# Patient Record
Sex: FEMALE | Race: WHITE | HISPANIC OR LATINO | Employment: FULL TIME | URBAN - METROPOLITAN AREA
[De-identification: names, ages, dates, MRNs, and addresses within clinical notes are randomized per-mention and may not be internally consistent; named-entity substitution may affect disease eponyms.]

---

## 2018-05-14 ENCOUNTER — TRANSCRIBE ORDERS (OUTPATIENT)
Dept: ADMINISTRATIVE | Facility: HOSPITAL | Age: 43
End: 2018-05-14

## 2018-05-14 DIAGNOSIS — R52 PAIN: Primary | ICD-10-CM

## 2018-05-14 DIAGNOSIS — R60.9 SWELLING: ICD-10-CM

## 2018-05-15 ENCOUNTER — HOSPITAL ENCOUNTER (OUTPATIENT)
Dept: CARDIOLOGY | Facility: HOSPITAL | Age: 43
Discharge: HOME | End: 2018-05-15
Attending: ORTHOPAEDIC SURGERY | Admitting: ORTHOPAEDIC SURGERY

## 2018-05-15 DIAGNOSIS — R60.9 SWELLING: ICD-10-CM

## 2018-05-15 DIAGNOSIS — R52 PAIN: ICD-10-CM

## 2018-05-15 LAB
BH CV LOWER VASCULAR LEFT COMMON FEMORAL AUGMENT: NORMAL
BH CV LOWER VASCULAR LEFT COMMON FEMORAL COMPETENT: NORMAL
BH CV LOWER VASCULAR LEFT COMMON FEMORAL COMPRESS: NORMAL
BH CV LOWER VASCULAR LEFT COMMON FEMORAL PHASIC: NORMAL
BH CV LOWER VASCULAR LEFT COMMON FEMORAL SPONT: NORMAL
BH CV LOWER VASCULAR LEFT DISTAL FEMORAL COMPRESS: NORMAL
BH CV LOWER VASCULAR LEFT GASTRONEMIUS COMPRESS: NORMAL
BH CV LOWER VASCULAR LEFT GREATER SAPH AK COMPRESS: NORMAL
BH CV LOWER VASCULAR LEFT GREATER SAPH BK COMPRESS: NORMAL
BH CV LOWER VASCULAR LEFT LESSER SAPH COMPRESS: NORMAL
BH CV LOWER VASCULAR LEFT MID FEMORAL AUGMENT: NORMAL
BH CV LOWER VASCULAR LEFT MID FEMORAL COMPETENT: NORMAL
BH CV LOWER VASCULAR LEFT MID FEMORAL COMPRESS: NORMAL
BH CV LOWER VASCULAR LEFT MID FEMORAL PHASIC: NORMAL
BH CV LOWER VASCULAR LEFT MID FEMORAL SPONT: NORMAL
BH CV LOWER VASCULAR LEFT PERONEAL COMPRESS: NORMAL
BH CV LOWER VASCULAR LEFT POPLITEAL AUGMENT: NORMAL
BH CV LOWER VASCULAR LEFT POPLITEAL COMPETENT: NORMAL
BH CV LOWER VASCULAR LEFT POPLITEAL COMPRESS: NORMAL
BH CV LOWER VASCULAR LEFT POPLITEAL PHASIC: NORMAL
BH CV LOWER VASCULAR LEFT POPLITEAL SPONT: NORMAL
BH CV LOWER VASCULAR LEFT POSTERIOR TIBIAL COMPRESS: NORMAL
BH CV LOWER VASCULAR LEFT PROXIMAL FEMORAL COMPRESS: NORMAL
BH CV LOWER VASCULAR LEFT SAPHENOFEMORAL JUNCTION AUGMENT: NORMAL
BH CV LOWER VASCULAR LEFT SAPHENOFEMORAL JUNCTION COMPETENT: NORMAL
BH CV LOWER VASCULAR LEFT SAPHENOFEMORAL JUNCTION COMPRESS: NORMAL
BH CV LOWER VASCULAR LEFT SAPHENOFEMORAL JUNCTION PHASIC: NORMAL
BH CV LOWER VASCULAR LEFT SAPHENOFEMORAL JUNCTION SPONT: NORMAL
BH CV LOWER VASCULAR RIGHT COMMON FEMORAL AUGMENT: NORMAL
BH CV LOWER VASCULAR RIGHT COMMON FEMORAL COMPETENT: NORMAL
BH CV LOWER VASCULAR RIGHT COMMON FEMORAL COMPRESS: NORMAL
BH CV LOWER VASCULAR RIGHT COMMON FEMORAL PHASIC: NORMAL
BH CV LOWER VASCULAR RIGHT COMMON FEMORAL SPONT: NORMAL

## 2018-05-15 PROCEDURE — 93971 EXTREMITY STUDY: CPT

## 2018-12-11 ENCOUNTER — APPOINTMENT (OUTPATIENT)
Dept: LAB | Facility: HOSPITAL | Age: 43
End: 2018-12-11

## 2018-12-11 ENCOUNTER — CONSULT (OUTPATIENT)
Dept: BARIATRICS/WEIGHT MGMT | Facility: CLINIC | Age: 43
End: 2018-12-11

## 2018-12-11 VITALS
SYSTOLIC BLOOD PRESSURE: 144 MMHG | DIASTOLIC BLOOD PRESSURE: 89 MMHG | WEIGHT: 293 LBS | RESPIRATION RATE: 18 BRPM | TEMPERATURE: 97.5 F | BODY MASS INDEX: 48.82 KG/M2 | HEIGHT: 65 IN | HEART RATE: 85 BPM

## 2018-12-11 DIAGNOSIS — R12 HEARTBURN: ICD-10-CM

## 2018-12-11 DIAGNOSIS — E66.01 MORBID OBESITY WITH BMI OF 50.0-59.9, ADULT (HCC): Primary | ICD-10-CM

## 2018-12-11 DIAGNOSIS — E11.69 DIABETES MELLITUS TYPE 2 IN OBESE (HCC): ICD-10-CM

## 2018-12-11 DIAGNOSIS — G47.33 OSA (OBSTRUCTIVE SLEEP APNEA): ICD-10-CM

## 2018-12-11 DIAGNOSIS — M25.50 MULTIPLE JOINT PAIN: ICD-10-CM

## 2018-12-11 DIAGNOSIS — R73.03 PREDIABETES: ICD-10-CM

## 2018-12-11 DIAGNOSIS — Z71.82 EXERCISE COUNSELING: ICD-10-CM

## 2018-12-11 DIAGNOSIS — Z71.3 DIETARY COUNSELING: ICD-10-CM

## 2018-12-11 DIAGNOSIS — E28.2 PCOS (POLYCYSTIC OVARIAN SYNDROME): ICD-10-CM

## 2018-12-11 DIAGNOSIS — E66.9 DIABETES MELLITUS TYPE 2 IN OBESE (HCC): ICD-10-CM

## 2018-12-11 PROBLEM — E04.1 THYROID NODULE: Status: ACTIVE | Noted: 2018-12-11

## 2018-12-11 PROBLEM — E11.9 DIABETES MELLITUS: Status: ACTIVE | Noted: 2018-12-11

## 2018-12-11 PROBLEM — I26.99 PULMONARY EMBOLISM (HCC): Status: ACTIVE | Noted: 2018-06-07

## 2018-12-11 PROBLEM — F41.8 DEPRESSION WITH ANXIETY: Status: ACTIVE | Noted: 2018-12-11

## 2018-12-11 PROBLEM — R60.9 EDEMA: Status: ACTIVE | Noted: 2018-12-11

## 2018-12-11 PROBLEM — R09.89 LABILE BLOOD PRESSURE: Status: ACTIVE | Noted: 2018-12-11

## 2018-12-11 LAB
CHOLEST SERPL-MCNC: 190 MG/DL (ref 0–200)
HBA1C MFR BLD: 5.3 % (ref 4.8–5.6)
HDLC SERPL-MCNC: 47 MG/DL (ref 40–60)
LDLC SERPL CALC-MCNC: 117 MG/DL (ref 0–100)
LDLC/HDLC SERPL: 2.49 {RATIO}
TRIGL SERPL-MCNC: 129 MG/DL (ref 0–150)
TSH SERPL DL<=0.05 MIU/L-ACNC: 3.2 MIU/ML (ref 0.27–4.2)
VLDLC SERPL-MCNC: 25.8 MG/DL (ref 5–40)

## 2018-12-11 PROCEDURE — 84443 ASSAY THYROID STIM HORMONE: CPT | Performed by: NURSE PRACTITIONER

## 2018-12-11 PROCEDURE — 80061 LIPID PANEL: CPT | Performed by: NURSE PRACTITIONER

## 2018-12-11 PROCEDURE — 36415 COLL VENOUS BLD VENIPUNCTURE: CPT | Performed by: NURSE PRACTITIONER

## 2018-12-11 PROCEDURE — 99205 OFFICE O/P NEW HI 60 MIN: CPT | Performed by: NURSE PRACTITIONER

## 2018-12-11 PROCEDURE — 83036 HEMOGLOBIN GLYCOSYLATED A1C: CPT | Performed by: NURSE PRACTITIONER

## 2018-12-11 RX ORDER — FUROSEMIDE 40 MG/1
40 TABLET ORAL AS NEEDED
COMMUNITY
Start: 2017-11-14 | End: 2019-11-20

## 2018-12-11 RX ORDER — ERGOCALCIFEROL 1.25 MG/1
50000 CAPSULE ORAL
Refills: 3 | COMMUNITY
Start: 2018-11-16

## 2018-12-11 NOTE — PROGRESS NOTES
"Bariatric Nutrition Counseling Interview    Patient Name:  Jessica Mota  YOB: 1975  Age:  43 y.o.  Sex:  female  MRN: [id number]  Date:  12/11/18    Procedure Considering:  Sleeve    Last Documented Height:    Ht Readings from Last 1 Encounters:   12/11/18 165.1 cm (65\")     Last Documented Weight:   Wt Readings from Last 1 Encounters:   12/11/18 (!) 148 kg (326 lb)      Body mass index is 54.25 kg/m².    Highest Weight:  326 lb.  Goal Weight: 180 lb.    History:  Past Medical History:   Diagnosis Date   • Hirsutism    • PCOS (polycystic ovarian syndrome)    • Plantar fasciitis    • Urinary urgency      Past Surgical History:   Procedure Laterality Date   • DILATATION AND CURETTAGE     • ECTOPIC PREGNANCY     • FOOT SURGERY     • HYSTERECTOMY     • SALPINGECTOMY     • TUBAL ABDOMINAL LIGATION     • WISDOM TOOTH EXTRACTION       Family History   Problem Relation Age of Onset   • Diabetes Mother    • Hypertension Mother    • Cancer Mother    • Hypertension Father      Social History     Socioeconomic History   • Marital status: Single     Spouse name: Not on file   • Number of children: 2   • Years of education: Not on file   • Highest education level: Not on file   Occupational History   • Occupation: [employer]   Tobacco Use   • Smoking status: Former Smoker   Substance and Sexual Activity   • Alcohol use: Yes     Comment: social   • Drug use: No   • Sexual activity: Defer     Additional Health Issues to Consider:  Joint pain, Goiter    Weight History:  Always been overweight    Previous Weight Loss Efforts:  Phentermine  Most Successful Weight Loss Effort:  Weight loss efforts have not been helpful    Eating Habits: Eat in response to stress, Eat out of boredom  Eat three meals on most days?  No  Worst eating habit?  skipping meals and overeating late     How often do you eat fast food? weekly    Do you exercise regularly? (at least 3 times each week)  No    Occupation:  [employer], some physical " activity required    Personal Goal After Procedure:  Jessica wants to go hiking and be more active without pain and fatigue   Personal Support:  friend    Assessment:    We reviewed program requirements for weight loss success following surgery. We discussed personal issues and lifestyle behaviors that may impact diet efforts. Program materials, including a reduced calorie diet were provided, discussed and recommended as the regimen to follow for pre and post diet planning. The significance of including at least 70 grams of protein daily with high fiber foods and good quality fats was emphasized. The importance of routine exercise was reinforced. Jessica demonstrated a good comprehension of diet requirements as well as a commitment to work on personal challenges. She will make a good candidate for weight loss surgery.                                                                                                                                                                                                        Electronically signed by:  Maria Guadalupe Ham RD  12/11/18 1:49 PM

## 2018-12-11 NOTE — PROGRESS NOTES
[facility]  [address]  [facility] [id number] [phone]  [address]  [facility] [address] [phone]  Dept: [phone]  12/11/2018      Jessica Mota.  [id number]  [id number]  1975  female      Chief Complaint of weight gain; unable to maintain weight loss    History of Present Illness:   Jessica is a 43 y.o. female who presents today for evaluation, education and consultation regarding weight loss surgery. The patient is interested in the sleeve gastrectomy.      Diet History:Jessica has been overweight for at least 15 years, has been 35 pounds or more overweight for at least 15 years, has been 100 pounds or more overweight for 15 or more years and started dieting at age 32.  The most weight Jessica lost was 15 pounds on WW and maintained the weight loss for 1 year. Jessica describes her eating habits as snacker and sweets eater. Jessica Mota has tried Weight Watchers, exercising and medications among others with success of losing up to 15 pounds, but in each instance regained the weight.      See dietician documentation for complete history.    Bariatric Surgery Evaluation: The patient is being seen for an initial visit for bariatric surgery evaluation.     Bariatric Co-morbidities:  sleep apnea, back pain, knee pain, edema and previous DVT    Patient Active Problem List   Diagnosis   • Morbid obesity with BMI of 50.0-59.9, adult (CMS/[facility])   • SHANDA (obstructive sleep apnea)   • Vitamin D insufficiency   • Thyroid nodule   • Pulmonary embolism (CMS/HCC)   • Depression with anxiety   • Edema   • Dietary counseling   • Exercise counseling   • Labile blood pressure   • Heartburn   • Multiple joint pain   • Prediabetes       Past Medical History:   Diagnosis Date   • Hirsutism    • PCOS (polycystic ovarian syndrome)    • Plantar fasciitis    • Urinary urgency        Past Surgical History:   Procedure Laterality Date   •  DILATATION AND CURETTAGE     • ECTOPIC PREGNANCY     • FOOT SURGERY     • HYSTERECTOMY     • SALPINGECTOMY     • TUBAL ABDOMINAL LIGATION     • WISDOM TOOTH EXTRACTION         Allergies   Allergen Reactions   • Levofloxacin Nausea Only   • Penicillins Rash         Current Outpatient Medications:   •  furosemide (LASIX) 40 MG tablet, Take 40 mg by mouth., Disp: , Rfl:   •  MAGNESIUM PO, Take  by mouth Daily., Disp: , Rfl:   •  vitamin D (ERGOCALCIFEROL) 40582 units capsule capsule, Take 50,000 Units by mouth Every 7 (Seven) Days., Disp: , Rfl: 3    Social History     Socioeconomic History   • Marital status: Single     Spouse name: Not on file   • Number of children: 2   • Years of education: Not on file   • Highest education level: Not on file   Social Needs   • Financial resource strain: Not on file   • Food insecurity - worry: Not on file   • Food insecurity - inability: Not on file   • Transportation needs - medical: Not on file   • Transportation needs - non-medical: Not on file   Occupational History   • Occupation: [employer]   Tobacco Use   • Smoking status: Former Smoker   Substance and Sexual Activity   • Alcohol use: Yes     Comment: social   • Drug use: No   • Sexual activity: Defer   Other Topics Concern   • Not on file   Social History Narrative   • Not on file       Family History   Problem Relation Age of Onset   • Diabetes Mother    • Hypertension Mother    • Cancer Mother    • Hypertension Father          Review of Systems:  Review of Systems   All other systems reviewed and are negative.      Physical Exam:  Vital Signs:  Weight: (!) 148 kg (326 lb)   Body mass index is 54.25 kg/m².  Temp: 97.5 °F (36.4 °C)   Heart Rate: 85   BP: 144/89     Physical Exam   Constitutional: She is oriented to person, place, and time. She appears well-developed and well-nourished.   HENT:   Head: Normocephalic and atraumatic.   Eyes: EOM are normal.   Cardiovascular: Normal rate, regular rhythm and normal heart sounds.    Pulmonary/Chest: Effort normal and breath sounds normal.   Abdominal: Soft. Bowel sounds are normal. She exhibits no distension. There is no tenderness.   Musculoskeletal: Normal range of motion.   Neurological: She is alert and oriented to person, place, and time.   Skin: Skin is warm and dry.   Psychiatric: She has a normal mood and affect. Her behavior is normal. Judgment and thought content normal.   Vitals reviewed.         Assessment:         Jessica Mota is a 43 y.o. year old female with medically complicated severe obesity. Weight: (!) 148 kg (326 lb), Body mass index is 54.25 kg/m². and weight related problems including sleep apnea, back pain, knee pain, edema and previous DVT.    I explained in detail the procedures that we are performing.  All of those procedures can be performed laparoscopically but there is a chance to convert to open if any technical challenges or complications do occur.  Bariatric surgery is not cosmetic surgery but rather a tool to help a patient make a life-long commitment lifestyle changes including diet, exercise, behavior changes, and taking supplemental vitamins and minerals.    Due to the patient's BMI and co-morbidities they are at a high risk for surgery and will obtain the following:  The patient has been advised that a letter of medical support and a history and physical must be obtained from her primary care physician. A psychological evaluation will be arranged for this patient. CBC, CMP, FLP, TSH and HgbA1C will be drawn. Jessica Mota will obtain a pre-operative CXR and EKG.     Jessica Mota will be set up for a pre-operative diagnostic esophagogastroduodenoscopy with biopsy for evaluation. The risks and benefits of the procedure were discussed with the patient in detail and all questions were answered.  Possibility of perforation, bleeding, aspiration, anoxic brain injury, respiratory and/or cardiac arrest and death were discussed.   She received handouts  regarding, all questions were answered and informed consent was obtained.     The risks, benefits, alternatives, and potential complications of all of the procedures were explained in detail including, but not limited to death, anesthesia and medication adverse effect/DVT, pulmonary embolism, trocar site/incisional hernia, wound infection, abdominal infection, bleeding, failure to lose weight or gain weight and change in body image, metabolic complications with calcium, thiamine, vitamin B12, folate, iron, and anemia.    The patient was advised to start a high protein, low fat and low carbohydrate diet. The patient was given individualized information by our dietician along with general group information and handouts.     The patient was given information regarding the RICKIE educational video. RICKIE is an internet based educational video which explains the surgical procedure and answers basic questions regarding the procedure. The patient was provided with instructions and a password to watch the video.    The patient was encouraged to start routine exercise including but not limited to 150 minutes per week. The patient received a resistance band along with a handout of exercises.     The consultation plan was reviewed with the patient.    The patient understands the surgical procedures and the different surgical options that are available.  She understands the lifestyle changes that would be required after surgery and has agreed to participate in a pre-operative and postoperative weight management program.  She also expressed understanding of possible risks, had several questions answered and desires to proceed.    I think she is a good candidate for this surgery, and is interested in a sleeve gastrectomy.    Encounter Diagnoses   Name Primary?   • Morbid obesity with BMI of 50.0-59.9, adult (CMS/[facility]) Yes   • SHANDA (obstructive sleep apnea)    • Heartburn    • Prediabetes    • Multiple joint pain    • Dietary  counseling    • Exercise counseling        Plan:    Patient will have evaluations and follow up with bariatric dieticians and a psychologist before undergoing a multidisciplinary review of her candidacy.  We also discussed the weight loss requirement and rationale, and other program requirements.      Lina Louis, APRN  12/11/2018

## 2019-01-29 ENCOUNTER — TRANSCRIBE ORDERS (OUTPATIENT)
Dept: PHYSICAL THERAPY | Facility: HOSPITAL | Age: 44
End: 2019-01-29

## 2019-01-29 DIAGNOSIS — R60.9 EDEMA, UNSPECIFIED TYPE: Primary | ICD-10-CM

## 2019-02-04 ENCOUNTER — APPOINTMENT (OUTPATIENT)
Dept: PHYSICAL THERAPY | Facility: HOSPITAL | Age: 44
End: 2019-02-04

## 2019-03-21 DIAGNOSIS — E66.01 MORBID OBESITY WITH BMI OF 50.0-59.9, ADULT (HCC): Primary | ICD-10-CM

## 2019-03-21 DIAGNOSIS — R12 HEARTBURN: ICD-10-CM

## 2019-04-09 ENCOUNTER — ANESTHESIA EVENT (OUTPATIENT)
Dept: GASTROENTEROLOGY | Facility: HOSPITAL | Age: 44
End: 2019-04-09

## 2019-04-09 ENCOUNTER — ANESTHESIA (OUTPATIENT)
Dept: GASTROENTEROLOGY | Facility: HOSPITAL | Age: 44
End: 2019-04-09

## 2019-04-09 ENCOUNTER — TELEPHONE (OUTPATIENT)
Dept: BARIATRICS/WEIGHT MGMT | Facility: CLINIC | Age: 44
End: 2019-04-09

## 2019-04-09 ENCOUNTER — HOSPITAL ENCOUNTER (OUTPATIENT)
Facility: HOSPITAL | Age: 44
Setting detail: HOSPITAL OUTPATIENT SURGERY
Discharge: HOME | End: 2019-04-09
Attending: SURGERY | Admitting: SURGERY

## 2019-04-09 VITALS
DIASTOLIC BLOOD PRESSURE: 51 MMHG | RESPIRATION RATE: 18 BRPM | WEIGHT: 293 LBS | OXYGEN SATURATION: 100 % | TEMPERATURE: 97.8 F | BODY MASS INDEX: 48.82 KG/M2 | HEIGHT: 65 IN | HEART RATE: 75 BPM | SYSTOLIC BLOOD PRESSURE: 118 MMHG

## 2019-04-09 DIAGNOSIS — A04.8 H. PYLORI INFECTION: Primary | ICD-10-CM

## 2019-04-09 DIAGNOSIS — R12 HEARTBURN: ICD-10-CM

## 2019-04-09 DIAGNOSIS — E66.01 MORBID OBESITY WITH BMI OF 50.0-59.9, ADULT (HCC): ICD-10-CM

## 2019-04-09 LAB — UREASE TISS QL: POSITIVE

## 2019-04-09 PROCEDURE — 94799 UNLISTED PULMONARY SVC/PX: CPT

## 2019-04-09 PROCEDURE — 87081 CULTURE SCREEN ONLY: CPT | Performed by: SURGERY

## 2019-04-09 PROCEDURE — 43239 EGD BIOPSY SINGLE/MULTIPLE: CPT | Performed by: SURGERY

## 2019-04-09 PROCEDURE — 25010000002 PROPOFOL 10 MG/ML EMULSION: Performed by: ANESTHESIOLOGY

## 2019-04-09 RX ORDER — SODIUM CHLORIDE, SODIUM LACTATE, POTASSIUM CHLORIDE, CALCIUM CHLORIDE 600; 310; 30; 20 MG/100ML; MG/100ML; MG/100ML; MG/100ML
30 INJECTION, SOLUTION INTRAVENOUS CONTINUOUS PRN
Status: DISCONTINUED | OUTPATIENT
Start: 2019-04-09 | End: 2019-04-09 | Stop reason: HOSPADM

## 2019-04-09 RX ORDER — IPRATROPIUM BROMIDE AND ALBUTEROL SULFATE 2.5; .5 MG/3ML; MG/3ML
3 SOLUTION RESPIRATORY (INHALATION)
Status: DISCONTINUED | OUTPATIENT
Start: 2019-04-09 | End: 2019-04-09 | Stop reason: HOSPADM

## 2019-04-09 RX ORDER — PROPOFOL 10 MG/ML
VIAL (ML) INTRAVENOUS AS NEEDED
Status: DISCONTINUED | OUTPATIENT
Start: 2019-04-09 | End: 2019-04-09 | Stop reason: SURG

## 2019-04-09 RX ORDER — LIDOCAINE HYDROCHLORIDE 20 MG/ML
INJECTION, SOLUTION INFILTRATION; PERINEURAL AS NEEDED
Status: DISCONTINUED | OUTPATIENT
Start: 2019-04-09 | End: 2019-04-09 | Stop reason: SURG

## 2019-04-09 RX ORDER — PROPOFOL 10 MG/ML
VIAL (ML) INTRAVENOUS CONTINUOUS PRN
Status: DISCONTINUED | OUTPATIENT
Start: 2019-04-09 | End: 2019-04-09 | Stop reason: SURG

## 2019-04-09 RX ORDER — LANSOPRAZOLE 30 MG/1
30 CAPSULE, DELAYED RELEASE ORAL 2 TIMES DAILY
Qty: 28 CAPSULE | Refills: 0 | Status: SHIPPED | OUTPATIENT
Start: 2019-04-09 | End: 2019-05-16

## 2019-04-09 RX ORDER — METRONIDAZOLE 500 MG/1
500 TABLET ORAL 2 TIMES DAILY
Qty: 28 TABLET | Refills: 0 | Status: SHIPPED | OUTPATIENT
Start: 2019-04-09 | End: 2019-05-16

## 2019-04-09 RX ORDER — CLARITHROMYCIN 500 MG/1
500 TABLET, COATED ORAL DAILY
Qty: 14 TABLET | Refills: 0 | Status: SHIPPED | OUTPATIENT
Start: 2019-04-09 | End: 2019-05-16

## 2019-04-09 RX ORDER — IPRATROPIUM BROMIDE AND ALBUTEROL SULFATE 2.5; .5 MG/3ML; MG/3ML
3 SOLUTION RESPIRATORY (INHALATION) ONCE
Status: COMPLETED | OUTPATIENT
Start: 2019-04-09 | End: 2019-04-09

## 2019-04-09 RX ADMIN — SODIUM CHLORIDE, POTASSIUM CHLORIDE, SODIUM LACTATE AND CALCIUM CHLORIDE 30 ML/HR: 600; 310; 30; 20 INJECTION, SOLUTION INTRAVENOUS at 06:43

## 2019-04-09 RX ADMIN — IPRATROPIUM BROMIDE AND ALBUTEROL SULFATE 3 ML: 2.5; .5 SOLUTION RESPIRATORY (INHALATION) at 07:43

## 2019-04-09 RX ADMIN — PROPOFOL 125 MG: 10 INJECTION, EMULSION INTRAVENOUS at 07:03

## 2019-04-09 RX ADMIN — PROPOFOL 125 MCG/KG/MIN: 10 INJECTION, EMULSION INTRAVENOUS at 07:03

## 2019-04-09 RX ADMIN — LIDOCAINE HYDROCHLORIDE 60 MG: 20 INJECTION, SOLUTION INFILTRATION; PERINEURAL at 07:01

## 2019-04-09 NOTE — OP NOTE
Surgeon: Stevan Hook Jr., M.D.    Preoperative Diagnosis: Dyspepsia    Postoperative Diagnosis: #1 gastritis #2 retained food questionable gastroparesis    Procedure Performed: Transoral esophagogastroduodenoscopy with antral biopsies    Indications: 44-year-old female with morbid obesity with complaints of heartburn.  Patient does not take H2 blocker or PPI on a regular basis.    Procedure:     The procedure, indications, preparation and potential complications were explained to the patient, who indicated understanding and signed the corresponding consent forms.  The patient was identified, taken to the endoscopy suite, and placed on the left side down decubitus position.  The patient underwent a MAC anesthesia and was appropriately monitored through the case by the anesthesia personnel with continuous pulse oximetry, blood pressure, and cardiac monitoring.  A bite block was placed.  After adequate IV sedation and using a transoral technique a lubed flexible endoscope was placed in the hypopharynx and advanced to the second portion of the duodenum without difficulty. The scope was then withdrawn back into the antrum of the stomach.  Cold forcep biopsies of the antrum were taken to rule out Helicobacter pylori.  The scope was retroflexed noting the body, fundus and cardia.  The scope was then withdrawn back into the esophagus after decompressing the stomach.  The Z line was noted and GE junction measured from the incisors.  The scope was then completely withdrawn.  The patient tolerated the procedure well and left the endoscopy suite in stable condition.  The findings are listed below.    Duodenum: Unremarkable  Antrum: Mild patchy erythema  Body/Fundus: Small amount of retained solid material.  The entire surface could not be visualized.  Cardia: No obvious defect small amount retained food and cannot be visualized completely  Esophagus: Z line fairly regular, GE junction measured approximately 40 cm from the  incisors    Recommendations:     Await biopsy results and follow-up in the office

## 2019-04-09 NOTE — TELEPHONE ENCOUNTER
Spoke with pt regarding h/pylori results. Medications ordered as well as breath test ordered. Pt made a verbal understanding.

## 2019-04-09 NOTE — DISCHARGE INSTRUCTIONS
For the next 24 hours patient needs to be with a responsible adult.    For 24 hours DO NOT drive, operate machinery, appliances, drink alcohol, make important decisions or sign legal documents.    You may have a sore, scratchy throat today.    Follow recommendations on procedure report if provided by your doctor.    Call Dr Hook for problems [phone]    Problems may include but not limited to: large amounts of bleeding, trouble breathing, repeated vomiting, severe unrelieved pain, fever or chills.

## 2019-04-09 NOTE — ANESTHESIA POSTPROCEDURE EVALUATION
Patient: Jessica Mota    Procedure Summary     Date:  04/09/19 Room / Location:   [facility]    Anesthesia Start:  0659 Anesthesia Stop:  0710    Procedure:  ESOPHAGOGASTRODUODENOSCOPY WITH BIOPSY (N/A Esophagus) Diagnosis:       Morbid obesity with BMI of 50.0-59.9, adult (CMS/[facility])      Heartburn      (Morbid obesity with BMI of 50.0-59.9, adult (CMS/[facility]) [E66.01, Z68.43])      (Heartburn [R12])    Surgeon:  Stevan Hook Jr., MD Provider:  Oscar Joseph MD    Anesthesia Type:  MAC ASA Status:  3          Anesthesia Type: MAC  Last vitals  BP   118/51 (04/09/19 0728)   Temp   36.6 °C (97.8 °F) (04/09/19 0635)   Pulse   82 (04/09/19 0728)   Resp   16 (04/09/19 0728)     SpO2   99 % (04/09/19 0728)     Post Anesthesia Care and Evaluation    Patient location during evaluation: PACU  Patient participation: complete - patient participated  Level of consciousness: awake  Pain score: 1  Pain management: adequate  Airway patency: patent  Anesthetic complications: No anesthetic complications  PONV Status: none  Cardiovascular status: acceptable  Respiratory status: acceptable  Hydration status: acceptable

## 2019-04-09 NOTE — ANESTHESIA PREPROCEDURE EVALUATION
Anesthesia Evaluation     Patient summary reviewed                Airway   Possible difficult intubation  Dental      Pulmonary    (+) asthma, sleep apnea,   Cardiovascular     ECG reviewed  Rhythm: regular        Neuro/Psych  GI/Hepatic/Renal/Endo    (+) morbid obesity,      Musculoskeletal     Abdominal    Substance History      OB/GYN          Other                        Anesthesia Plan    ASA 3     MAC     Anesthetic plan, all risks, benefits, and alternatives have been provided, discussed and informed consent has been obtained with: patient.

## 2019-04-09 NOTE — H&P
[facility] BARIATRIC SURGERY  [address]  [facility] [id number] [phone]  [address]  [facility] [address] [phone]  Dept: [phone]  12/11/2018        Jessica Mota.  [id number]  [id number]  1975  female        Chief Complaint of weight gain; unable to maintain weight loss     History of Present Illness:   Jessica is a 43 y.o. female who presents today for evaluation, education and consultation regarding weight loss surgery. The patient is interested in the sleeve gastrectomy.                   Diet History:Jessica has been overweight for at least 15 years, has been 35 pounds or more overweight for at least 15 years, has been 100 pounds or more overweight for 15 or more years and started dieting at age 32.  The most weight Jessica lost was 15 pounds on WW and maintained the weight loss for 1 year. Jessica describes her eating habits as snacker and sweets eater. Jessica Mota has tried Weight Watchers, exercising and medications among others with success of losing up to 15 pounds, but in each instance regained the weight.      See dietician documentation for complete history.     Bariatric Surgery Evaluation: The patient is being seen for an initial visit for bariatric surgery evaluation.      Bariatric Co-morbidities:  sleep apnea, back pain, knee pain, edema and previous DVT         Patient Active Problem List   Diagnosis   • Morbid obesity with BMI of 50.0-59.9, adult (CMS/[facility])   • SHANDA (obstructive sleep apnea)   • Vitamin D insufficiency   • Thyroid nodule   • Pulmonary embolism (CMS/HCC)   • Depression with anxiety   • Edema   • Dietary counseling   • Exercise counseling   • Labile blood pressure   • Heartburn   • Multiple joint pain   • Prediabetes         Medical History        Past Medical History:   Diagnosis Date   • Hirsutism     • PCOS (polycystic ovarian syndrome)     • Plantar fasciitis     • Urinary urgency               Surgical History         Past Surgical History:   Procedure Laterality Date   • DILATATION AND CURETTAGE       • ECTOPIC PREGNANCY       • FOOT SURGERY       • HYSTERECTOMY       • SALPINGECTOMY       • TUBAL ABDOMINAL LIGATION       • WISDOM TOOTH EXTRACTION                     Allergies   Allergen Reactions   • Levofloxacin Nausea Only   • Penicillins Rash            Current Outpatient Medications:   •  furosemide (LASIX) 40 MG tablet, Take 40 mg by mouth., Disp: , Rfl:   •  MAGNESIUM PO, Take  by mouth Daily., Disp: , Rfl:   •  vitamin D (ERGOCALCIFEROL) 27319 units capsule capsule, Take 50,000 Units by mouth Every 7 (Seven) Days., Disp: , Rfl: 3     Social History            Socioeconomic History   • Marital status: Single       Spouse name: Not on file   • Number of children: 2   • Years of education: Not on file   • Highest education level: Not on file   Social Needs   • Financial resource strain: Not on file   • Food insecurity - worry: Not on file   • Food insecurity - inability: Not on file   • Transportation needs - medical: Not on file   • Transportation needs - non-medical: Not on file   Occupational History   • Occupation: [employer]   Tobacco Use   • Smoking status: Former Smoker   Substance and Sexual Activity   • Alcohol use: Yes       Comment: social   • Drug use: No   • Sexual activity: Defer   Other Topics Concern   • Not on file   Social History Narrative   • Not on file               Family History   Problem Relation Age of Onset   • Diabetes Mother     • Hypertension Mother     • Cancer Mother     • Hypertension Father              Review of Systems:  Review of Systems   All other systems reviewed and are negative.        Physical Exam:  Vital Signs:  Weight: (!) 148 kg (326 lb)   Body mass index is 54.25 kg/m².  Temp: 97.5 °F (36.4 °C)   Heart Rate: 85   BP: 144/89      Physical Exam   Constitutional: She is oriented to person, place, and time. She appears well-developed and well-nourished.    HENT:   Head: Normocephalic and atraumatic.   Eyes: EOM are normal.   Cardiovascular: Normal rate, regular rhythm and normal heart sounds.   Pulmonary/Chest: Effort normal and breath sounds normal.   Abdominal: Soft. Bowel sounds are normal. She exhibits no distension. There is no tenderness.   Musculoskeletal: Normal range of motion.   Neurological: She is alert and oriented to person, place, and time.   Skin: Skin is warm and dry.   Psychiatric: She has a normal mood and affect. Her behavior is normal. Judgment and thought content normal.   Vitals reviewed.           Assessment:      Assessment        Plan         Jessica Mota is a 43 y.o. year old female with medically complicated severe obesity. Weight: (!) 148 kg (326 lb), Body mass index is 54.25 kg/m². and weight related problems including sleep apnea, back pain, knee pain, edema and previous DVT.     I explained in detail the procedures that we are performing.  All of those procedures can be performed laparoscopically but there is a chance to convert to open if any technical challenges or complications do occur.  Bariatric surgery is not cosmetic surgery but rather a tool to help a patient make a life-long commitment lifestyle changes including diet, exercise, behavior changes, and taking supplemental vitamins and minerals.     Due to the patient's BMI and co-morbidities they are at a high risk for surgery and will obtain the following:  The patient has been advised that a letter of medical support and a history and physical must be obtained from her primary care physician. A psychological evaluation will be arranged for this patient. CBC, CMP, FLP, TSH and HgbA1C will be drawn. Jessica Mota will obtain a pre-operative CXR and EKG.      Jessica Mota will be set up for a pre-operative diagnostic esophagogastroduodenoscopy with biopsy for evaluation. The risks and benefits of the procedure were discussed with the patient in detail and all questions  were answered.  Possibility of perforation, bleeding, aspiration, anoxic brain injury, respiratory and/or cardiac arrest and death were discussed.   She received handouts regarding, all questions were answered and informed consent was obtained.      The risks, benefits, alternatives, and potential complications of all of the procedures were explained in detail including, but not limited to death, anesthesia and medication adverse effect/DVT, pulmonary embolism, trocar site/incisional hernia, wound infection, abdominal infection, bleeding, failure to lose weight or gain weight and change in body image, metabolic complications with calcium, thiamine, vitamin B12, folate, iron, and anemia.     The patient was advised to start a high protein, low fat and low carbohydrate diet. The patient was given individualized information by our dietician along with general group information and handouts.      The patient was given information regarding the RICKIE educational video. RICKIE is an internet based educational video which explains the surgical procedure and answers basic questions regarding the procedure. The patient was provided with instructions and a password to watch the video.     The patient was encouraged to start routine exercise including but not limited to 150 minutes per week. The patient received a resistance band along with a handout of exercises.      The consultation plan was reviewed with the patient.     The patient understands the surgical procedures and the different surgical options that are available.  She understands the lifestyle changes that would be required after surgery and has agreed to participate in a pre-operative and postoperative weight management program.  She also expressed understanding of possible risks, had several questions answered and desires to proceed.     I think she is a good candidate for this surgery, and is interested in a sleeve gastrectomy.     Encounter Diagnoses   Name  Primary?   • Morbid obesity with BMI of 50.0-59.9, adult (CMS/HCC) Yes   • SHANDA (obstructive sleep apnea)     • Heartburn     • Prediabetes     • Multiple joint pain     • Dietary counseling     • Exercise counseling           Plan:     Patient will have evaluations and follow up with bariatric dieticians and a psychologist before undergoing a multidisciplinary review of her candidacy.  We also discussed the weight loss requirement and rationale, and other program requirements.        Lina Louis, APRN

## 2019-04-25 ENCOUNTER — CONSULT (OUTPATIENT)
Dept: BARIATRICS/WEIGHT MGMT | Facility: CLINIC | Age: 44
End: 2019-04-25

## 2019-04-25 VITALS
HEIGHT: 65 IN | RESPIRATION RATE: 18 BRPM | DIASTOLIC BLOOD PRESSURE: 87 MMHG | WEIGHT: 293 LBS | SYSTOLIC BLOOD PRESSURE: 153 MMHG | HEART RATE: 81 BPM | TEMPERATURE: 97.4 F | BODY MASS INDEX: 48.82 KG/M2

## 2019-04-25 DIAGNOSIS — R73.03 PREDIABETES: ICD-10-CM

## 2019-04-25 DIAGNOSIS — Z71.82 EXERCISE COUNSELING: ICD-10-CM

## 2019-04-25 DIAGNOSIS — E55.9 VITAMIN D INSUFFICIENCY: ICD-10-CM

## 2019-04-25 DIAGNOSIS — R60.0 LOCALIZED EDEMA: ICD-10-CM

## 2019-04-25 DIAGNOSIS — E66.01 MORBID OBESITY WITH BMI OF 50.0-59.9, ADULT (HCC): Primary | ICD-10-CM

## 2019-04-25 DIAGNOSIS — Z71.3 DIETARY COUNSELING: ICD-10-CM

## 2019-04-25 DIAGNOSIS — G47.33 OSA (OBSTRUCTIVE SLEEP APNEA): ICD-10-CM

## 2019-04-25 DIAGNOSIS — M25.50 MULTIPLE JOINT PAIN: ICD-10-CM

## 2019-04-25 PROCEDURE — 99215 OFFICE O/P EST HI 40 MIN: CPT | Performed by: SURGERY

## 2019-04-25 RX ORDER — URSODIOL 300 MG/1
300 CAPSULE ORAL 2 TIMES DAILY
Qty: 60 CAPSULE | Refills: 5 | Status: SHIPPED | OUTPATIENT
Start: 2019-04-25 | End: 2019-11-20

## 2019-04-25 NOTE — H&P
Bariatric Consult:  Referred by Juan Gaitan MD    Jessica Mota is here today for consult on Consult (sleeve consultation )      History of Present Illness:     Jessica Mota is a 44 y.o. female with morbid obesity with co-morbidities including sleep apnea, diabetes, osteoarthritis, back pain, knee pain and edema who presents for surgical consultation for the above procedure. Jessica has completed the initial intake visit and has been examined by our nurse practitioner, dietician, psychologist and underwent the extensive educational teaching process under the guidance of our bariatric coordinator and myself. Jessica also has seen the educational video RICKIE on the surgical procedure if available. Jessica attended today more educational teaching from our bariatric coordinator and myself. Jessica has had an extensive medical workup including a visit with their primary care physician, EKG, chest radiograph, blood work, EGD or UGI and possibly further testing. These have been reviewed by me and discussed with the patient. Jessica is now ready to proceed with surgery. Jessica presently denies nausea, vomiting, fever, chills, chest pain, shortness of air, melena, hematochezia, hemetemesis, dysuria, frequency, hematuria, jaundice or abdominal pain.       Past Medical History:   Diagnosis Date   • Asthma     EXERCISE INDUCED ASTHMA    • Hirsutism    • PCOS (polycystic ovarian syndrome)    • PE (pulmonary thromboembolism) (CMS/[facility]) 2018   • Plantar fasciitis    • Sleep apnea    • Thyroid nodule    • Urinary urgency        Encounter Diagnoses   Name Primary?   • Morbid obesity with BMI of 50.0-59.9, adult (CMS/[facility]) Yes   • SHANDA (obstructive sleep apnea)    • Dietary counseling    • Exercise counseling    • Multiple joint pain    • Prediabetes    • Vitamin D insufficiency    • Localized edema        Past Surgical History:   Procedure Laterality Date   • DILATATION AND CURETTAGE     • ECTOPIC PREGNANCY     • ENDOSCOPY N/A 4/9/2019     Procedure: ESOPHAGOGASTRODUODENOSCOPY WITH BIOPSY;  Surgeon: Stevan Hook Jr., MD;  Location: [facility] ENDOSCOPY;  Service: General   • FOOT SURGERY     • HYSTERECTOMY     • SALPINGECTOMY     • TUBAL ABDOMINAL LIGATION     • WISDOM TOOTH EXTRACTION         Patient Active Problem List   Diagnosis   • Morbid obesity with BMI of 50.0-59.9, adult (CMS/HCC)   • SHANDA (obstructive sleep apnea)   • Vitamin D insufficiency   • Thyroid nodule   • Pulmonary embolism (CMS/HCC)   • Depression with anxiety   • Edema   • Dietary counseling   • Exercise counseling   • Labile blood pressure   • Heartburn   • Multiple joint pain   • Prediabetes       Allergies   Allergen Reactions   • Levofloxacin Nausea Only   • Penicillins Rash         Current Outpatient Medications:   •  albuterol (PROVENTIL) (5 MG/ML) 0.5% nebulizer solution, Take 2.5 mg by nebulization As Needed for Wheezing., Disp: , Rfl:   •  clarithromycin (BIAXIN) 500 MG tablet, Take 1 tablet by mouth Daily., Disp: 14 tablet, Rfl: 0  •  furosemide (LASIX) 40 MG tablet, Take 40 mg by mouth As Needed., Disp: , Rfl:   •  lansoprazole (PREVACID) 30 MG capsule, Take 1 capsule by mouth 2 (Two) Times a Day., Disp: 28 capsule, Rfl: 0  •  MAGNESIUM PO, Take  by mouth Daily., Disp: , Rfl:   •  metroNIDAZOLE (FLAGYL) 500 MG tablet, Take 1 tablet by mouth 2 (Two) Times a Day., Disp: 28 tablet, Rfl: 0  •  vitamin D (ERGOCALCIFEROL) 00625 units capsule capsule, Take 50,000 Units by mouth Every 7 (Seven) Days., Disp: , Rfl: 3  •  enoxaparin (LOVENOX) 40 MG/0.4ML solution syringe, Inject 0.4 mL under the skin into the appropriate area as directed Daily for 21 days. Start after surgery unless instructed otherwise, Disp: 21 syringe, Rfl: 0  •  folic acid-vit B6-vit B12 (FOLBEE) 2.5-25-1 MG tablet tablet, Take 1 tablet by mouth Daily., Disp: 40 tablet, Rfl: 0  •  ursodiol (ACTIGALL) 300 MG capsule, Take 1 capsule by mouth 2 (Two) Times a Day., Disp: 60 capsule, Rfl: 5    Social History      Socioeconomic History   • Marital status: Single     Spouse name: Not on file   • Number of children: 2   • Years of education: Not on file   • Highest education level: Not on file   Occupational History   • Occupation: [employer]   Tobacco Use   • Smoking status: Former Smoker     Years: 15.00     Types: Cigarettes   • Tobacco comment: OFF AND ON FOR 15 YEARS   Substance and Sexual Activity   • Alcohol use: Yes     Comment: social   • Drug use: No   • Sexual activity: Defer       Family History   Problem Relation Age of Onset   • Diabetes Mother    • Hypertension Mother    • Cancer Mother    • Hypertension Father        Review of Systems:  Review of Systems   Constitutional: Positive for fatigue.   Musculoskeletal: Positive for arthralgias and back pain.   All other systems reviewed and are negative.        Physical Exam:    Vital Signs:  Weight: (!) 153 kg (338 lb)   Body mass index is 56.25 kg/m².  Temp: 97.4 °F (36.3 °C)   Heart Rate: 81   BP: 153/87       Physical Exam   Constitutional: She is oriented to person, place, and time. She appears well-nourished.   HENT:   Head: Normocephalic and atraumatic.   Mouth/Throat: Oropharynx is clear and moist.   Eyes: Conjunctivae and EOM are normal. Pupils are equal, round, and reactive to light. No scleral icterus.   Neck: Normal range of motion. Neck supple. No thyromegaly present.   Cardiovascular: Normal rate and regular rhythm.   Pulmonary/Chest: Effort normal and breath sounds normal.   Abdominal: Soft. Bowel sounds are normal. She exhibits no distension and no mass. There is no tenderness. There is no rebound and no guarding. No hernia.   Musculoskeletal: Normal range of motion. She exhibits edema.   Lymphadenopathy:     She has no cervical adenopathy.   Neurological: She is alert and oriented to person, place, and time. No cranial nerve deficit. Coordination normal.   Skin: Skin is warm and dry. No erythema.   Psychiatric: She has a normal mood and affect. Her  behavior is normal.   Vitals reviewed.        Assessment:    Jessica Mota is a 44 y.o. year old female with medically complicated severe obesity with a BMI of Body mass index is 56.25 kg/m². and multiple co-morbidities listed in the encounter diagnosis.    I think she is an appropriate candidate for this surgery, and is ready to proceed.      Plan/Discussion/Summary:  No hiatal hernia per me.  No PPI.  H. pylori positive and is being treated.  We will retest per protocol    The patient has returned to the office for a surgical consultation and has requested to proceed with a laparoscopic gastric sleeve.  I have had the opportunity to obtain a history, examine the patient and review the patient's chart.    The patient understands that surgery is a tool and that weight loss is not guaranteed but only seen in the context of appropriate use, regular follow up, exercise and making appropriate food choices.     I personally discussed the potential complications of the laparoscopic gastric sleeve with this patient.  The patient is well aware of potential complications of the surgery that include but not limited to bleeding, infections, deep vein thrombosis, pulmonary embolism, pulmonary complications such as pneumonia, cardiac event, hernias, small bowel obstruction, damage to the spleen or other organs, bowel injury, disfiguring scars, failure to lose weight, need for additional surgery, conversion to an open procedure and death.  The patient is also aware of complications which apply in particular to the gastric sleeve and can include but not limited to the leakage of gastric contents at the staple line, the development of an intra-abdominal abscess, gastroesophageal reflux disease, Jerry's esophagus, ulcers, vitamin/mineral deficiencies, strictures, and the possibility of converting this procedure to a Maria Del Carmen-en-Y gastric bypass. The patient also understands the possibility of requiring an acid reducer medication for  the rest of their life.    The risks, benefits, potential complications and alternative therapies were discussed at great length as outlined in our extensive consent forms, online consent and educational teaching processes.    The patient has confirmed the participation in the programs extensive educational activities.    All questions and concerns were answered to patient's satisfaction.  The patient now wishes to proceed with surgery.    Patient has declined the pre-operative insertion of an IVC filter.     The patient has agreed to a postoperative course of anitcoagulant therapy.      I instructed patient to start on a H2 blocker or proton pump inhibitor if not already on one of these medications.    I explained in detail the procedures that we perform.  All of these procedures have a chance to convert to open if any technical challenges or complications do occur.  Bariatric surgery is not cosmetic surgery but rather a tool to help a patient make a life-long commitment lifestyle change including diet, exercise, behavior changes, and taking supplemental vitamins and minerals.    Problems after surgery may require more operations to correct them.    The risks, benefits, alternatives, and potential complications of all of the procedures were explained in detail including, but not limited to death, anesthesia and medication adverse effect, deep venous thrombosis, pulmonary embolism, trocar site/incisional hernia, wound infection, abdominal infection, bleeding, failure to lose weight, gain weight, a change in body image, metabolic complications with vitamin deficiences and anemia.    Weight loss expectations were discussed with the patient in detail. The weight loss operations most commonly performed are the sleeve gastrectomy and the Maria Del Carmen-en-Y gastric bypass. These operations result in weight losses up to approximately 25-35% of initial body weight 12 to 24 months after surgery with the gastric bypass usually the  higher percent of weight loss but depends on patient using the tool.    For the gastric bypass and loop duodenal switch (LAURA-S) the risks include but not limited to the following early complications:  Anastomotic leak/peritonitis, Maria Del Carmen/Alimentary/biliopancreatic limb obstruction, severe & minor wound infection/seroma, and nausea/vomiting.  Late complications can include but are not limited to malnutrition, vitamin deficiencies, frequent loose stools,  stomal stenosis, marginal ulcer, bowel obstruction, intussusception, internal, and incisional hernia.    Regarding the gastric sleeve, there is less long-term outcome data and higher risk of dysphagia and reflux compared to a gastric bypass, as well as risk of internal visceral/organ injury, splenectomy, bleeding, infection, leak (which could require further intervention possible conversion to Maria Del Carmen-en-Y gastric bypass), stenosis and possibility of regaining weight.    Jessica was counseled regarding diagnostic results, instructions for management, risk factor reductions, prognosis, patient and family education, impressions, risks and benefits of treatment options and importance of compliance with treatment. Total face to face time of the encounter was over 45 minutes and over 30 minutes was spent counseling.     Bernadette Report   As part of this patient's treatment plan I am prescribing controlled substances. The patient has been made aware of appropriate use of such medications, including potential risk of somnolence, limited ability to drive and /or work safely, and potential for dependence or overdose. It has also been made clear that these medications are for use by this patient only, without concomitant use of alcohol or other substances unless prescribed.    Jessica has completed prescribing agreement detailing terms of continued prescribing of controlled substances, including monitoring BERNADETTE reports, urine drug screening, and pill counts if necessary. Jessica is  aware that inappropriate use will result in cessation of prescribing such medications.    BERNADETTE report has been reviewed      History and physical exam exhibit continued safe and appropriate use of controlled substances.      Jessica understands the surgical procedures and the different surgical options that are available.  She understands the lifestyle changes that are required after surgery and has agreed to follow the guidelines outlined in the weight management program.  She also expressed understanding of the risks involved and had all of female questions answered and desires to proceed.      Stevan Hook MD  4/25/2019

## 2019-04-25 NOTE — PATIENT INSTRUCTIONS
Bariatric Manual    You were provided a manual specific to the procedure that you have chosen.  Please refer to that with any questions or call the office at [phone]

## 2019-05-06 ENCOUNTER — PREP FOR SURGERY (OUTPATIENT)
Dept: OTHER | Facility: HOSPITAL | Age: 44
End: 2019-05-06

## 2019-05-06 RX ORDER — PANTOPRAZOLE SODIUM 40 MG/10ML
40 INJECTION, POWDER, LYOPHILIZED, FOR SOLUTION INTRAVENOUS ONCE
Status: CANCELLED | OUTPATIENT
Start: 2019-05-20 | End: 2019-05-06

## 2019-05-06 RX ORDER — SODIUM CHLORIDE 0.9 % (FLUSH) 0.9 %
3-10 SYRINGE (ML) INJECTION AS NEEDED
Status: CANCELLED | OUTPATIENT
Start: 2019-05-20

## 2019-05-06 RX ORDER — METOCLOPRAMIDE HYDROCHLORIDE 5 MG/ML
10 INJECTION INTRAMUSCULAR; INTRAVENOUS ONCE
Status: CANCELLED | OUTPATIENT
Start: 2019-05-20 | End: 2019-05-06

## 2019-05-06 RX ORDER — SODIUM CHLORIDE, SODIUM LACTATE, POTASSIUM CHLORIDE, CALCIUM CHLORIDE 600; 310; 30; 20 MG/100ML; MG/100ML; MG/100ML; MG/100ML
100 INJECTION, SOLUTION INTRAVENOUS CONTINUOUS
Status: CANCELLED | OUTPATIENT
Start: 2019-05-20

## 2019-05-06 RX ORDER — SCOLOPAMINE TRANSDERMAL SYSTEM 1 MG/1
1 PATCH, EXTENDED RELEASE TRANSDERMAL ONCE
Status: CANCELLED | OUTPATIENT
Start: 2019-05-20 | End: 2019-05-06

## 2019-05-06 RX ORDER — CHLORHEXIDINE GLUCONATE 0.12 MG/ML
15 RINSE ORAL SEE ADMIN INSTRUCTIONS
Status: CANCELLED | OUTPATIENT
Start: 2019-05-20

## 2019-05-06 RX ORDER — ACETAMINOPHEN 160 MG/5ML
975 SOLUTION ORAL ONCE
Status: CANCELLED | OUTPATIENT
Start: 2019-05-20 | End: 2019-05-06

## 2019-05-06 RX ORDER — SODIUM CHLORIDE 0.9 % (FLUSH) 0.9 %
3 SYRINGE (ML) INJECTION EVERY 12 HOURS SCHEDULED
Status: CANCELLED | OUTPATIENT
Start: 2019-05-06

## 2019-05-13 ENCOUNTER — APPOINTMENT (OUTPATIENT)
Dept: PREADMISSION TESTING | Facility: HOSPITAL | Age: 44
End: 2019-05-13

## 2019-05-16 ENCOUNTER — APPOINTMENT (OUTPATIENT)
Dept: PREADMISSION TESTING | Facility: HOSPITAL | Age: 44
End: 2019-05-16

## 2019-05-16 VITALS
SYSTOLIC BLOOD PRESSURE: 114 MMHG | HEART RATE: 82 BPM | DIASTOLIC BLOOD PRESSURE: 87 MMHG | OXYGEN SATURATION: 98 % | RESPIRATION RATE: 18 BRPM | TEMPERATURE: 97.7 F

## 2019-05-16 LAB
ALBUMIN SERPL-MCNC: 4.1 G/DL (ref 3.5–5.2)
ALBUMIN/GLOB SERPL: 1.2 G/DL
ALP SERPL-CCNC: 77 U/L (ref 39–117)
ALT SERPL W P-5'-P-CCNC: 35 U/L (ref 1–33)
ANION GAP SERPL CALCULATED.3IONS-SCNC: 16.4 MMOL/L
AST SERPL-CCNC: 25 U/L (ref 1–32)
BILIRUB SERPL-MCNC: 0.5 MG/DL (ref 0.2–1.2)
BUN BLD-MCNC: 20 MG/DL (ref 6–20)
BUN/CREAT SERPL: 20.6 (ref 7–25)
CALCIUM SPEC-SCNC: 9.9 MG/DL (ref 8.6–10.5)
CHLORIDE SERPL-SCNC: 100 MMOL/L (ref 98–107)
CO2 SERPL-SCNC: 22.6 MMOL/L (ref 22–29)
CREAT BLD-MCNC: 0.97 MG/DL (ref 0.57–1)
DEPRECATED RDW RBC AUTO: 47 FL (ref 37–54)
ERYTHROCYTE [DISTWIDTH] IN BLOOD BY AUTOMATED COUNT: 14.6 % (ref 12.3–15.4)
GFR SERPL CREATININE-BSD FRML MDRD: 62 ML/MIN/1.73
GFR SERPL CREATININE-BSD FRML MDRD: 76 ML/MIN/1.73
GLOBULIN UR ELPH-MCNC: 3.5 GM/DL
GLUCOSE BLD-MCNC: 80 MG/DL (ref 65–99)
HCT VFR BLD AUTO: 39.9 % (ref 34–46.6)
HGB BLD-MCNC: 12.6 G/DL (ref 12–15.9)
MCH RBC QN AUTO: 27.5 PG (ref 26.6–33)
MCHC RBC AUTO-ENTMCNC: 31.6 G/DL (ref 31.5–35.7)
MCV RBC AUTO: 87.1 FL (ref 79–97)
PLATELET # BLD AUTO: 301 10*3/MM3 (ref 140–450)
PMV BLD AUTO: 9 FL (ref 6–12)
POTASSIUM BLD-SCNC: 3.9 MMOL/L (ref 3.5–5.2)
PROT SERPL-MCNC: 7.6 G/DL (ref 6–8.5)
RBC # BLD AUTO: 4.58 10*6/MM3 (ref 3.77–5.28)
SODIUM BLD-SCNC: 139 MMOL/L (ref 136–145)
WBC NRBC COR # BLD: 7.77 10*3/MM3 (ref 3.4–10.8)

## 2019-05-16 PROCEDURE — 85027 COMPLETE CBC AUTOMATED: CPT | Performed by: SURGERY

## 2019-05-16 PROCEDURE — 36415 COLL VENOUS BLD VENIPUNCTURE: CPT

## 2019-05-16 PROCEDURE — 80053 COMPREHEN METABOLIC PANEL: CPT | Performed by: SURGERY

## 2019-05-16 PROCEDURE — 83013 H PYLORI (C-13) BREATH: CPT | Performed by: NURSE PRACTITIONER

## 2019-05-16 RX ORDER — FLUTICASONE PROPIONATE 50 MCG
2 SPRAY, SUSPENSION (ML) NASAL DAILY
COMMUNITY
End: 2020-01-02

## 2019-05-16 NOTE — DISCHARGE INSTRUCTIONS
Take only the following medications the morning of surgery with a small sip of water:   BRING INHALER    Do not take Bariatric Vitamins, Folic Acid, Actigall (if applicable) or Lovenox Injections (if applicable) the morning of surgery.  If you have a history of blood clots or have a BMI greater than 50, Dr. Hook may order Lovenox for after surgery. Do not take Lovenox blood thinner before surgery.      General Instructions:   • Do not eat solid food after midnight the night before surgery.    • After midnight, you may have up to 20 oz of clear-artificially sweetened liquid (to include Gatorade Zero, Powerade Zero, Water, Tea/Coffee with no cream, milk or sugar).  Nothing red in color.  Any drinks must be completed 2 hours before your arrival time. Patients who avoid smoking, chewing tobacco and alcohol for 4 weeks prior to surgery have a reduced risk of post-operative complications.  Quit smoking as many days before surgery as you can.  • Do not smoke, use chewing tobacco or drink alcohol the day of surgery.   • Bring any papers given to you in the doctor’s office.  Wear clean comfortable clothes and socks.  • Do not wear contact lenses, false eyelashes or make-up.  Bring a case for your glasses.   • Bring crutches or walker if applicable.  • Remove all piercings.  Leave jewelry and any other valuables at home.  • Remove fingernail polish, gel overlays or any artificial nails.  • Hair extensions with metal clips must be removed prior to surgery.  • The Pre-Admission Testing nurse will instruct you to bring medications if unable to obtain an accurate list in Pre-Admission Testing.      If you were given a blood bank ID arm band remember to bring it with you the day of surgery.    Preventing a Surgical Site Infection: WIPES GIVEN TO PT  • For 2 to 3 days before surgery, avoid shaving with a razor because the razor can irritate skin and make it easier to develop an infection.    • Any areas of open skin can increase  the risk of a post-operative wound infection by allowing bacteria to enter and travel throughout the body.  Notify your surgeon if you have any skin wounds / rashes even if it is not near the expected surgical site.  The area will need assessed to determine if surgery should be delayed until it is healed.  • 2 days prior to surgery, take a shower using a fresh bar of anti-bacterial soap (such as Dial).  Use a clean washcloth and dry with a clean towel.    • The day prior to surgery, take a shower using a fresh bar of anti-bacterial soap (such as Dial).  Use a clean washcloth and dry with a clean towel.  After the shower, utilize a package of cloths given to you in PAT.  Sleep in a clean bed with clean clothing.  Do not allow pets to sleep with you.  • The morning of surgery shower using a fresh bar of anti-bacterial soap (such as Dial).  Use a clean washcloth and dry with a clean towel.  Then utilize the other package of the cloths given to you in PAT.  Dress in clean clothing.  • Do not use any cologne, deodorant, lotion or powder morning of surgery.   Ask your surgeon if you will be receiving antibiotics prior to surgery.  • Make sure you, your family, and all healthcare providers clean their hands with soap and water or an alcohol based hand  before caring for you or your wound.      Day of surgery: 5/20/2019 ARRIVAL TIME 5:45 AM  Upon arrival, a Pre-op nurse and Anesthesiologist will review your health history, obtain vital signs, and answer questions you may have.  A Pre-op nurse will start an IV and you may receive medication in preparation for surgery, including something to help you relax.  Your family will be able to see you in the Pre-op area.  While you are in surgery, your family should notify the waiting room  if they leave the waiting room area and provide a contact phone number.  If you are staying overnight your family can leave your belongings in the car and bring them to your  room later.  If applicable, we do ask that you have your C-PAP/BI-PAP machine available. It can be utilized the night of surgery.     Please be aware that surgery does come with discomfort.  We want to make every effort to control your discomfort so please discuss any uncontrolled symptoms with your nurse.   Your doctor will most likely have prescribed pain medications.      If you are going home after surgery you will receive individualized written care instructions before being discharged.  A responsible adult must drive you to and from the hospital on the day of your surgery and stay with you for 24 hours.    If you are staying overnight following surgery, you will be transported to your hospital room following the recovery period.  [facility] has all private rooms.    You have received a list of surgical assistants for your reference.   If you have any questions please call Pre-Admission Testing at [phone].  Deductibles and co-payments are collected on the day of service. Please be prepared to pay the required co-pay, deductible or deposit on the day of service as defined by your plan.    2% CHLORAHEXIDINE GLUCONATE* CLOTH  Preparing or “prepping” skin before surgery can reduce the risk of infection at the surgical site. To make the process easier, [facility] has chosen disposable cloths moistened with a rinse-free, 2% Chlorhexidine Gluconate (CHG) antiseptic solution. The steps below outline the prepping process and should be carefully followed.        Use the prep cloth on the area that is circled in the diagram             Directions Night before Surgery  1) Shower using a fresh bar of anti-bacterial soap (such as Dial) and clean washcloth.  Use a clean towel to completely dry your skin.  2) Do not use any lotions, oils or creams on your skin.  3) Open the package and remove 1 cloth, wipe your skin for 30 seconds in a circular motion.  Allow to dry for 3 minutes.  4) Repeat #3  with second cloth.  5) Do not touch your eyes, ears, or mouth with the prep cloth.  6) Allow the wet prep solution to air dry.  7) Discard the prep cloth and wash your hands with soap and water.   8) Dress in clean bed clothes and sleep on fresh clean bed sheets.   9) You may experience some temporary itching after the prep.    Directions Day of Surgery  1) Repeat steps 1,2,3,4,5,6,7, and 9.   2) Dress in clean clothes before coming to the hospital.

## 2019-05-20 ENCOUNTER — ANESTHESIA EVENT (OUTPATIENT)
Dept: PERIOP | Facility: HOSPITAL | Age: 44
End: 2019-05-20

## 2019-05-20 ENCOUNTER — ANESTHESIA (OUTPATIENT)
Dept: PERIOP | Facility: HOSPITAL | Age: 44
End: 2019-05-20

## 2019-05-20 ENCOUNTER — HOSPITAL ENCOUNTER (INPATIENT)
Facility: HOSPITAL | Age: 44
LOS: 1 days | Discharge: HOME | End: 2019-05-21
Attending: SURGERY | Admitting: SURGERY

## 2019-05-20 DIAGNOSIS — E66.01 MORBID OBESITY WITH BMI OF 50.0-59.9, ADULT (HCC): Primary | ICD-10-CM

## 2019-05-20 LAB — UREA BREATH TEST QL: POSITIVE

## 2019-05-20 PROCEDURE — 94640 AIRWAY INHALATION TREATMENT: CPT

## 2019-05-20 PROCEDURE — 25010000002 ENOXAPARIN PER 10 MG: Performed by: SURGERY

## 2019-05-20 PROCEDURE — 25010000002 FENTANYL CITRATE (PF) 100 MCG/2ML SOLUTION: Performed by: NURSE ANESTHETIST, CERTIFIED REGISTERED

## 2019-05-20 PROCEDURE — 25010000002 SUCCINYLCHOLINE PER 20 MG: Performed by: NURSE ANESTHETIST, CERTIFIED REGISTERED

## 2019-05-20 PROCEDURE — 43775 LAP SLEEVE GASTRECTOMY: CPT | Performed by: NURSE PRACTITIONER

## 2019-05-20 PROCEDURE — 25010000002 KETOROLAC TROMETHAMINE PER 15 MG: Performed by: NURSE ANESTHETIST, CERTIFIED REGISTERED

## 2019-05-20 PROCEDURE — 25010000002 ONDANSETRON PER 1 MG: Performed by: NURSE ANESTHETIST, CERTIFIED REGISTERED

## 2019-05-20 PROCEDURE — 94799 UNLISTED PULMONARY SVC/PX: CPT

## 2019-05-20 PROCEDURE — 25010000002 METOCLOPRAMIDE PER 10 MG: Performed by: SURGERY

## 2019-05-20 PROCEDURE — 43775 LAP SLEEVE GASTRECTOMY: CPT | Performed by: SURGERY

## 2019-05-20 PROCEDURE — 88307 TISSUE EXAM BY PATHOLOGIST: CPT | Performed by: SURGERY

## 2019-05-20 PROCEDURE — 25010000002 PROPOFOL 10 MG/ML EMULSION: Performed by: NURSE ANESTHETIST, CERTIFIED REGISTERED

## 2019-05-20 PROCEDURE — 0BQT4ZZ REPAIR DIAPHRAGM, PERCUTANEOUS ENDOSCOPIC APPROACH: ICD-10-PCS | Performed by: SURGERY

## 2019-05-20 PROCEDURE — 0DB64Z3 EXCISION OF STOMACH, PERCUTANEOUS ENDOSCOPIC APPROACH, VERTICAL: ICD-10-PCS | Performed by: SURGERY

## 2019-05-20 PROCEDURE — 25010000002 KETOROLAC TROMETHAMINE PER 15 MG: Performed by: SURGERY

## 2019-05-20 PROCEDURE — 25010000002 VANCOMYCIN 1 G RECONSTITUTED SOLUTION 1 EACH VIAL: Performed by: SURGERY

## 2019-05-20 PROCEDURE — 25010000002 DEXAMETHASONE PER 1 MG: Performed by: NURSE ANESTHETIST, CERTIFIED REGISTERED

## 2019-05-20 DEVICE — ENDOPATH ECHELON ENDOSCOPIC LINEAR CUTTER RELOADS, GREEN, 60MM
Type: IMPLANTABLE DEVICE | Site: STOMACH | Status: FUNCTIONAL
Brand: ECHELON ENDOPATH

## 2019-05-20 DEVICE — STPL/LN REINF PERISTRIP DRY VERITAS THN: Type: IMPLANTABLE DEVICE | Site: STOMACH | Status: FUNCTIONAL

## 2019-05-20 DEVICE — SEALANT WND FIBRIN TISSEEL PREFIL/SYR/PRIMAFZ 4ML: Type: IMPLANTABLE DEVICE | Site: STOMACH | Status: FUNCTIONAL

## 2019-05-20 RX ORDER — PROMETHAZINE HYDROCHLORIDE 25 MG/1
25 SUPPOSITORY RECTAL ONCE AS NEEDED
Status: DISCONTINUED | OUTPATIENT
Start: 2019-05-20 | End: 2019-05-20 | Stop reason: HOSPADM

## 2019-05-20 RX ORDER — ACETAMINOPHEN 650 MG/1
650 SUPPOSITORY RECTAL ONCE AS NEEDED
Status: DISCONTINUED | OUTPATIENT
Start: 2019-05-20 | End: 2019-05-20 | Stop reason: HOSPADM

## 2019-05-20 RX ORDER — KETOROLAC TROMETHAMINE 30 MG/ML
INJECTION, SOLUTION INTRAMUSCULAR; INTRAVENOUS AS NEEDED
Status: DISCONTINUED | OUTPATIENT
Start: 2019-05-20 | End: 2019-05-20 | Stop reason: SURG

## 2019-05-20 RX ORDER — HYDROMORPHONE HYDROCHLORIDE 1 MG/ML
0.25 INJECTION, SOLUTION INTRAMUSCULAR; INTRAVENOUS; SUBCUTANEOUS
Status: DISCONTINUED | OUTPATIENT
Start: 2019-05-20 | End: 2019-05-20 | Stop reason: HOSPADM

## 2019-05-20 RX ORDER — LIDOCAINE HYDROCHLORIDE 20 MG/ML
INJECTION, SOLUTION INFILTRATION; PERINEURAL AS NEEDED
Status: DISCONTINUED | OUTPATIENT
Start: 2019-05-20 | End: 2019-05-20 | Stop reason: SURG

## 2019-05-20 RX ORDER — ALBUTEROL SULFATE 2.5 MG/3ML
2.5 SOLUTION RESPIRATORY (INHALATION)
Status: DISCONTINUED | OUTPATIENT
Start: 2019-05-20 | End: 2019-05-21 | Stop reason: HOSPADM

## 2019-05-20 RX ORDER — ONDANSETRON 2 MG/ML
4 INJECTION INTRAMUSCULAR; INTRAVENOUS EVERY 4 HOURS PRN
Status: DISCONTINUED | OUTPATIENT
Start: 2019-05-20 | End: 2019-05-21 | Stop reason: HOSPADM

## 2019-05-20 RX ORDER — CYANOCOBALAMIN 1000 UG/ML
1000 INJECTION, SOLUTION INTRAMUSCULAR; SUBCUTANEOUS ONCE
Status: COMPLETED | OUTPATIENT
Start: 2019-05-21 | End: 2019-05-21

## 2019-05-20 RX ORDER — SODIUM CHLORIDE, SODIUM LACTATE, POTASSIUM CHLORIDE, CALCIUM CHLORIDE 600; 310; 30; 20 MG/100ML; MG/100ML; MG/100ML; MG/100ML
9 INJECTION, SOLUTION INTRAVENOUS CONTINUOUS
Status: DISCONTINUED | OUTPATIENT
Start: 2019-05-20 | End: 2019-05-20 | Stop reason: HOSPADM

## 2019-05-20 RX ORDER — PROMETHAZINE HYDROCHLORIDE 25 MG/ML
6.25 INJECTION, SOLUTION INTRAMUSCULAR; INTRAVENOUS ONCE AS NEEDED
Status: DISCONTINUED | OUTPATIENT
Start: 2019-05-20 | End: 2019-05-20 | Stop reason: HOSPADM

## 2019-05-20 RX ORDER — DEXAMETHASONE SODIUM PHOSPHATE 10 MG/ML
INJECTION INTRAMUSCULAR; INTRAVENOUS AS NEEDED
Status: DISCONTINUED | OUTPATIENT
Start: 2019-05-20 | End: 2019-05-20 | Stop reason: SURG

## 2019-05-20 RX ORDER — ROCURONIUM BROMIDE 10 MG/ML
INJECTION, SOLUTION INTRAVENOUS AS NEEDED
Status: DISCONTINUED | OUTPATIENT
Start: 2019-05-20 | End: 2019-05-20 | Stop reason: SURG

## 2019-05-20 RX ORDER — ONDANSETRON 2 MG/ML
INJECTION INTRAMUSCULAR; INTRAVENOUS AS NEEDED
Status: DISCONTINUED | OUTPATIENT
Start: 2019-05-20 | End: 2019-05-20 | Stop reason: SURG

## 2019-05-20 RX ORDER — ACETAMINOPHEN 325 MG/1
650 TABLET ORAL ONCE AS NEEDED
Status: DISCONTINUED | OUTPATIENT
Start: 2019-05-20 | End: 2019-05-20 | Stop reason: HOSPADM

## 2019-05-20 RX ORDER — MIDAZOLAM HYDROCHLORIDE 1 MG/ML
2 INJECTION INTRAMUSCULAR; INTRAVENOUS
Status: DISCONTINUED | OUTPATIENT
Start: 2019-05-20 | End: 2019-05-20 | Stop reason: HOSPADM

## 2019-05-20 RX ORDER — FENTANYL CITRATE 50 UG/ML
INJECTION, SOLUTION INTRAMUSCULAR; INTRAVENOUS AS NEEDED
Status: DISCONTINUED | OUTPATIENT
Start: 2019-05-20 | End: 2019-05-20 | Stop reason: SURG

## 2019-05-20 RX ORDER — HYDRALAZINE HYDROCHLORIDE 20 MG/ML
5 INJECTION INTRAMUSCULAR; INTRAVENOUS
Status: DISCONTINUED | OUTPATIENT
Start: 2019-05-20 | End: 2019-05-20 | Stop reason: HOSPADM

## 2019-05-20 RX ORDER — DIPHENHYDRAMINE HYDROCHLORIDE 50 MG/ML
12.5 INJECTION INTRAMUSCULAR; INTRAVENOUS
Status: DISCONTINUED | OUTPATIENT
Start: 2019-05-20 | End: 2019-05-20 | Stop reason: SDUPTHER

## 2019-05-20 RX ORDER — CHLORHEXIDINE GLUCONATE 0.12 MG/ML
15 RINSE ORAL SEE ADMIN INSTRUCTIONS
Status: COMPLETED | OUTPATIENT
Start: 2019-05-20 | End: 2019-05-20

## 2019-05-20 RX ORDER — PROMETHAZINE HYDROCHLORIDE 25 MG/1
25 TABLET ORAL ONCE AS NEEDED
Status: DISCONTINUED | OUTPATIENT
Start: 2019-05-20 | End: 2019-05-20 | Stop reason: HOSPADM

## 2019-05-20 RX ORDER — SCOLOPAMINE TRANSDERMAL SYSTEM 1 MG/1
1 PATCH, EXTENDED RELEASE TRANSDERMAL ONCE
Status: DISCONTINUED | OUTPATIENT
Start: 2019-05-20 | End: 2019-05-20

## 2019-05-20 RX ORDER — SODIUM CHLORIDE, SODIUM LACTATE, POTASSIUM CHLORIDE, CALCIUM CHLORIDE 600; 310; 30; 20 MG/100ML; MG/100ML; MG/100ML; MG/100ML
150 INJECTION, SOLUTION INTRAVENOUS CONTINUOUS
Status: DISCONTINUED | OUTPATIENT
Start: 2019-05-20 | End: 2019-05-21 | Stop reason: HOSPADM

## 2019-05-20 RX ORDER — PROMETHAZINE HYDROCHLORIDE 25 MG/ML
12.5 INJECTION, SOLUTION INTRAMUSCULAR; INTRAVENOUS EVERY 4 HOURS PRN
Status: DISCONTINUED | OUTPATIENT
Start: 2019-05-20 | End: 2019-05-21 | Stop reason: HOSPADM

## 2019-05-20 RX ORDER — EPHEDRINE SULFATE 50 MG/ML
INJECTION, SOLUTION INTRAVENOUS AS NEEDED
Status: DISCONTINUED | OUTPATIENT
Start: 2019-05-20 | End: 2019-05-20 | Stop reason: SURG

## 2019-05-20 RX ORDER — HYDROCODONE BITARTRATE AND ACETAMINOPHEN 5; 325 MG/1; MG/1
1 TABLET ORAL ONCE AS NEEDED
Status: DISCONTINUED | OUTPATIENT
Start: 2019-05-20 | End: 2019-05-20 | Stop reason: HOSPADM

## 2019-05-20 RX ORDER — FAMOTIDINE 10 MG/ML
20 INJECTION, SOLUTION INTRAVENOUS EVERY 12 HOURS SCHEDULED
Status: DISCONTINUED | OUTPATIENT
Start: 2019-05-20 | End: 2019-05-21 | Stop reason: HOSPADM

## 2019-05-20 RX ORDER — HYDROMORPHONE HYDROCHLORIDE 1 MG/ML
0.5 INJECTION, SOLUTION INTRAMUSCULAR; INTRAVENOUS; SUBCUTANEOUS
Status: DISCONTINUED | OUTPATIENT
Start: 2019-05-20 | End: 2019-05-21 | Stop reason: HOSPADM

## 2019-05-20 RX ORDER — MIDAZOLAM HYDROCHLORIDE 1 MG/ML
1 INJECTION INTRAMUSCULAR; INTRAVENOUS
Status: DISCONTINUED | OUTPATIENT
Start: 2019-05-20 | End: 2019-05-20 | Stop reason: HOSPADM

## 2019-05-20 RX ORDER — SODIUM CHLORIDE, SODIUM LACTATE, POTASSIUM CHLORIDE, CALCIUM CHLORIDE 600; 310; 30; 20 MG/100ML; MG/100ML; MG/100ML; MG/100ML
100 INJECTION, SOLUTION INTRAVENOUS CONTINUOUS
Status: DISCONTINUED | OUTPATIENT
Start: 2019-05-20 | End: 2019-05-20 | Stop reason: HOSPADM

## 2019-05-20 RX ORDER — LORAZEPAM 1 MG/1
1 TABLET ORAL EVERY 12 HOURS PRN
Status: DISCONTINUED | OUTPATIENT
Start: 2019-05-20 | End: 2019-05-21 | Stop reason: HOSPADM

## 2019-05-20 RX ORDER — SUCCINYLCHOLINE CHLORIDE 20 MG/ML
INJECTION INTRAMUSCULAR; INTRAVENOUS AS NEEDED
Status: DISCONTINUED | OUTPATIENT
Start: 2019-05-20 | End: 2019-05-20 | Stop reason: SURG

## 2019-05-20 RX ORDER — SODIUM CHLORIDE 0.9 % (FLUSH) 0.9 %
3 SYRINGE (ML) INJECTION EVERY 12 HOURS SCHEDULED
Status: DISCONTINUED | OUTPATIENT
Start: 2019-05-20 | End: 2019-05-20 | Stop reason: HOSPADM

## 2019-05-20 RX ORDER — NITROGLYCERIN 0.4 MG/1
0.4 TABLET SUBLINGUAL
Status: DISCONTINUED | OUTPATIENT
Start: 2019-05-20 | End: 2019-05-21 | Stop reason: HOSPADM

## 2019-05-20 RX ORDER — SODIUM CHLORIDE 0.9 % (FLUSH) 0.9 %
1-10 SYRINGE (ML) INJECTION AS NEEDED
Status: DISCONTINUED | OUTPATIENT
Start: 2019-05-20 | End: 2019-05-20 | Stop reason: HOSPADM

## 2019-05-20 RX ORDER — DIPHENHYDRAMINE HYDROCHLORIDE 50 MG/ML
25 INJECTION INTRAMUSCULAR; INTRAVENOUS EVERY 4 HOURS PRN
Status: DISCONTINUED | OUTPATIENT
Start: 2019-05-20 | End: 2019-05-21 | Stop reason: HOSPADM

## 2019-05-20 RX ORDER — PANTOPRAZOLE SODIUM 40 MG/10ML
40 INJECTION, POWDER, LYOPHILIZED, FOR SOLUTION INTRAVENOUS ONCE
Status: COMPLETED | OUTPATIENT
Start: 2019-05-20 | End: 2019-05-20

## 2019-05-20 RX ORDER — DIPHENHYDRAMINE HYDROCHLORIDE 50 MG/ML
12.5 INJECTION INTRAMUSCULAR; INTRAVENOUS
Status: DISCONTINUED | OUTPATIENT
Start: 2019-05-20 | End: 2019-05-20 | Stop reason: HOSPADM

## 2019-05-20 RX ORDER — BUPIVACAINE HYDROCHLORIDE AND EPINEPHRINE 5; 5 MG/ML; UG/ML
INJECTION, SOLUTION PERINEURAL AS NEEDED
Status: DISCONTINUED | OUTPATIENT
Start: 2019-05-20 | End: 2019-05-20 | Stop reason: HOSPADM

## 2019-05-20 RX ORDER — NALBUPHINE HCL 10 MG/ML
2 AMPUL (ML) INJECTION EVERY 4 HOURS PRN
Status: DISCONTINUED | OUTPATIENT
Start: 2019-05-20 | End: 2019-05-20 | Stop reason: HOSPADM

## 2019-05-20 RX ORDER — FENTANYL CITRATE 50 UG/ML
50 INJECTION, SOLUTION INTRAMUSCULAR; INTRAVENOUS
Status: DISCONTINUED | OUTPATIENT
Start: 2019-05-20 | End: 2019-05-20 | Stop reason: HOSPADM

## 2019-05-20 RX ORDER — LIDOCAINE HYDROCHLORIDE 10 MG/ML
0.5 INJECTION, SOLUTION EPIDURAL; INFILTRATION; INTRACAUDAL; PERINEURAL ONCE AS NEEDED
Status: DISCONTINUED | OUTPATIENT
Start: 2019-05-20 | End: 2019-05-20 | Stop reason: HOSPADM

## 2019-05-20 RX ORDER — NALBUPHINE HCL 10 MG/ML
2 AMPUL (ML) INJECTION EVERY 4 HOURS PRN
Status: DISCONTINUED | OUTPATIENT
Start: 2019-05-20 | End: 2019-05-20 | Stop reason: SDUPTHER

## 2019-05-20 RX ORDER — HYDROMORPHONE HYDROCHLORIDE 2 MG/1
2 TABLET ORAL EVERY 4 HOURS PRN
Status: DISCONTINUED | OUTPATIENT
Start: 2019-05-20 | End: 2019-05-21 | Stop reason: HOSPADM

## 2019-05-20 RX ORDER — HYDRALAZINE HYDROCHLORIDE 20 MG/ML
5 INJECTION INTRAMUSCULAR; INTRAVENOUS
Status: DISCONTINUED | OUTPATIENT
Start: 2019-05-20 | End: 2019-05-20 | Stop reason: SDUPTHER

## 2019-05-20 RX ORDER — NALOXONE HCL 0.4 MG/ML
0.4 VIAL (ML) INJECTION AS NEEDED
Status: DISCONTINUED | OUTPATIENT
Start: 2019-05-20 | End: 2019-05-20 | Stop reason: HOSPADM

## 2019-05-20 RX ORDER — NALBUPHINE HCL 10 MG/ML
10 AMPUL (ML) INJECTION EVERY 4 HOURS PRN
Status: DISCONTINUED | OUTPATIENT
Start: 2019-05-20 | End: 2019-05-20 | Stop reason: SDUPTHER

## 2019-05-20 RX ORDER — PROPOFOL 10 MG/ML
VIAL (ML) INTRAVENOUS AS NEEDED
Status: DISCONTINUED | OUTPATIENT
Start: 2019-05-20 | End: 2019-05-20 | Stop reason: SURG

## 2019-05-20 RX ORDER — LABETALOL HYDROCHLORIDE 5 MG/ML
10 INJECTION, SOLUTION INTRAVENOUS
Status: DISCONTINUED | OUTPATIENT
Start: 2019-05-20 | End: 2019-05-21 | Stop reason: HOSPADM

## 2019-05-20 RX ORDER — METOCLOPRAMIDE HYDROCHLORIDE 5 MG/ML
10 INJECTION INTRAMUSCULAR; INTRAVENOUS ONCE
Status: COMPLETED | OUTPATIENT
Start: 2019-05-20 | End: 2019-05-20

## 2019-05-20 RX ORDER — NALOXONE HCL 0.4 MG/ML
0.4 VIAL (ML) INJECTION
Status: DISCONTINUED | OUTPATIENT
Start: 2019-05-20 | End: 2019-05-21 | Stop reason: HOSPADM

## 2019-05-20 RX ORDER — ONDANSETRON 4 MG/1
4 TABLET, ORALLY DISINTEGRATING ORAL EVERY 4 HOURS PRN
Status: DISCONTINUED | OUTPATIENT
Start: 2019-05-20 | End: 2019-05-21 | Stop reason: HOSPADM

## 2019-05-20 RX ORDER — SODIUM CHLORIDE 0.9 % (FLUSH) 0.9 %
3-10 SYRINGE (ML) INJECTION AS NEEDED
Status: DISCONTINUED | OUTPATIENT
Start: 2019-05-20 | End: 2019-05-20 | Stop reason: HOSPADM

## 2019-05-20 RX ORDER — ONDANSETRON 4 MG/1
4 TABLET, FILM COATED ORAL EVERY 4 HOURS PRN
Status: DISCONTINUED | OUTPATIENT
Start: 2019-05-20 | End: 2019-05-21 | Stop reason: HOSPADM

## 2019-05-20 RX ORDER — SODIUM CHLORIDE 0.9 % (FLUSH) 0.9 %
3 SYRINGE (ML) INJECTION EVERY 12 HOURS SCHEDULED
Status: DISCONTINUED | OUTPATIENT
Start: 2019-05-20 | End: 2019-05-21 | Stop reason: HOSPADM

## 2019-05-20 RX ORDER — SODIUM CHLORIDE 9 MG/ML
INJECTION, SOLUTION INTRAVENOUS AS NEEDED
Status: DISCONTINUED | OUTPATIENT
Start: 2019-05-20 | End: 2019-05-20 | Stop reason: HOSPADM

## 2019-05-20 RX ORDER — MORPHINE SULFATE 2 MG/ML
2 INJECTION, SOLUTION INTRAMUSCULAR; INTRAVENOUS
Status: DISCONTINUED | OUTPATIENT
Start: 2019-05-20 | End: 2019-05-21 | Stop reason: HOSPADM

## 2019-05-20 RX ORDER — ACETAMINOPHEN 160 MG/5ML
975 SOLUTION ORAL ONCE
Status: COMPLETED | OUTPATIENT
Start: 2019-05-20 | End: 2019-05-20

## 2019-05-20 RX ORDER — KETOROLAC TROMETHAMINE 30 MG/ML
30 INJECTION, SOLUTION INTRAMUSCULAR; INTRAVENOUS 4 TIMES DAILY
Status: COMPLETED | OUTPATIENT
Start: 2019-05-20 | End: 2019-05-21

## 2019-05-20 RX ORDER — LORAZEPAM 2 MG/ML
1 INJECTION INTRAMUSCULAR EVERY 12 HOURS PRN
Status: DISCONTINUED | OUTPATIENT
Start: 2019-05-20 | End: 2019-05-21 | Stop reason: HOSPADM

## 2019-05-20 RX ORDER — MAGNESIUM HYDROXIDE 1200 MG/15ML
LIQUID ORAL AS NEEDED
Status: DISCONTINUED | OUTPATIENT
Start: 2019-05-20 | End: 2019-05-20 | Stop reason: HOSPADM

## 2019-05-20 RX ORDER — NALOXONE HCL 0.4 MG/ML
0.1 VIAL (ML) INJECTION
Status: DISCONTINUED | OUTPATIENT
Start: 2019-05-20 | End: 2019-05-21 | Stop reason: HOSPADM

## 2019-05-20 RX ORDER — METOCLOPRAMIDE HYDROCHLORIDE 5 MG/ML
10 INJECTION INTRAMUSCULAR; INTRAVENOUS EVERY 6 HOURS
Status: DISCONTINUED | OUTPATIENT
Start: 2019-05-20 | End: 2019-05-21 | Stop reason: HOSPADM

## 2019-05-20 RX ORDER — ACETAMINOPHEN 500 MG
1000 TABLET ORAL EVERY 6 HOURS PRN
Status: DISCONTINUED | OUTPATIENT
Start: 2019-05-20 | End: 2019-05-21 | Stop reason: HOSPADM

## 2019-05-20 RX ORDER — NALOXONE HCL 0.4 MG/ML
0.4 VIAL (ML) INJECTION AS NEEDED
Status: DISCONTINUED | OUTPATIENT
Start: 2019-05-20 | End: 2019-05-20 | Stop reason: SDUPTHER

## 2019-05-20 RX ORDER — NALBUPHINE HCL 10 MG/ML
10 AMPUL (ML) INJECTION EVERY 4 HOURS PRN
Status: DISCONTINUED | OUTPATIENT
Start: 2019-05-20 | End: 2019-05-20 | Stop reason: HOSPADM

## 2019-05-20 RX ADMIN — SCOPALAMINE 1 PATCH: 1 PATCH, EXTENDED RELEASE TRANSDERMAL at 06:33

## 2019-05-20 RX ADMIN — PANTOPRAZOLE SODIUM 40 MG: 40 INJECTION, POWDER, FOR SOLUTION INTRAVENOUS at 06:54

## 2019-05-20 RX ADMIN — KETOROLAC TROMETHAMINE 30 MG: 30 INJECTION, SOLUTION INTRAMUSCULAR; INTRAVENOUS at 20:41

## 2019-05-20 RX ADMIN — SUCCINYLCHOLINE CHLORIDE 140 MG: 20 INJECTION, SOLUTION INTRAMUSCULAR; INTRAVENOUS at 07:05

## 2019-05-20 RX ADMIN — ALBUTEROL SULFATE 2.5 MG: 2.5 SOLUTION RESPIRATORY (INHALATION) at 12:20

## 2019-05-20 RX ADMIN — AZTREONAM 2 G: 2 INJECTION, POWDER, LYOPHILIZED, FOR SOLUTION INTRAMUSCULAR; INTRAVENOUS at 06:49

## 2019-05-20 RX ADMIN — SODIUM CHLORIDE, POTASSIUM CHLORIDE, SODIUM LACTATE AND CALCIUM CHLORIDE 150 ML/HR: 600; 310; 30; 20 INJECTION, SOLUTION INTRAVENOUS at 20:29

## 2019-05-20 RX ADMIN — FENTANYL CITRATE 50 MCG: 50 INJECTION, SOLUTION INTRAMUSCULAR; INTRAVENOUS at 08:16

## 2019-05-20 RX ADMIN — ENOXAPARIN SODIUM 40 MG: 40 INJECTION SUBCUTANEOUS at 06:44

## 2019-05-20 RX ADMIN — SUGAMMADEX 280 MG: 100 INJECTION, SOLUTION INTRAVENOUS at 07:58

## 2019-05-20 RX ADMIN — FENTANYL CITRATE 50 MCG: 50 INJECTION, SOLUTION INTRAMUSCULAR; INTRAVENOUS at 07:31

## 2019-05-20 RX ADMIN — ROCURONIUM BROMIDE 25 MG: 10 INJECTION, SOLUTION INTRAVENOUS at 07:17

## 2019-05-20 RX ADMIN — METOCLOPRAMIDE 10 MG: 5 INJECTION, SOLUTION INTRAMUSCULAR; INTRAVENOUS at 06:53

## 2019-05-20 RX ADMIN — KETOROLAC TROMETHAMINE 30 MG: 30 INJECTION, SOLUTION INTRAMUSCULAR; INTRAVENOUS at 07:57

## 2019-05-20 RX ADMIN — KETOROLAC TROMETHAMINE 30 MG: 30 INJECTION, SOLUTION INTRAMUSCULAR; INTRAVENOUS at 18:48

## 2019-05-20 RX ADMIN — VANCOMYCIN HYDROCHLORIDE 2250 MG: 1 INJECTION, POWDER, LYOPHILIZED, FOR SOLUTION INTRAVENOUS at 06:49

## 2019-05-20 RX ADMIN — PROPOFOL 200 MG: 10 INJECTION, EMULSION INTRAVENOUS at 07:05

## 2019-05-20 RX ADMIN — FENTANYL CITRATE 50 MCG: 50 INJECTION, SOLUTION INTRAMUSCULAR; INTRAVENOUS at 07:04

## 2019-05-20 RX ADMIN — SODIUM CHLORIDE, POTASSIUM CHLORIDE, SODIUM LACTATE AND CALCIUM CHLORIDE 150 ML/HR: 600; 310; 30; 20 INJECTION, SOLUTION INTRAVENOUS at 13:55

## 2019-05-20 RX ADMIN — SODIUM CHLORIDE, POTASSIUM CHLORIDE, SODIUM LACTATE AND CALCIUM CHLORIDE 500 ML: 600; 310; 30; 20 INJECTION, SOLUTION INTRAVENOUS at 06:15

## 2019-05-20 RX ADMIN — ACETAMINOPHEN 975 MG: 160 SOLUTION ORAL at 06:13

## 2019-05-20 RX ADMIN — FENTANYL CITRATE 50 MCG: 50 INJECTION, SOLUTION INTRAMUSCULAR; INTRAVENOUS at 08:00

## 2019-05-20 RX ADMIN — ROCURONIUM BROMIDE 5 MG: 10 INJECTION, SOLUTION INTRAVENOUS at 07:05

## 2019-05-20 RX ADMIN — FAMOTIDINE 20 MG: 10 INJECTION INTRAVENOUS at 20:41

## 2019-05-20 RX ADMIN — ALBUTEROL SULFATE 2.5 MG: 2.5 SOLUTION RESPIRATORY (INHALATION) at 19:46

## 2019-05-20 RX ADMIN — KETOROLAC TROMETHAMINE 30 MG: 30 INJECTION, SOLUTION INTRAMUSCULAR; INTRAVENOUS at 13:53

## 2019-05-20 RX ADMIN — FENTANYL CITRATE 50 MCG: 50 INJECTION, SOLUTION INTRAMUSCULAR; INTRAVENOUS at 08:12

## 2019-05-20 RX ADMIN — HYOSCYAMINE SULFATE 125 MCG: 0.12 TABLET, ORALLY DISINTEGRATING ORAL at 18:48

## 2019-05-20 RX ADMIN — EPHEDRINE SULFATE 10 MG: 50 INJECTION INTRAMUSCULAR; INTRAVENOUS; SUBCUTANEOUS at 07:22

## 2019-05-20 RX ADMIN — EPHEDRINE SULFATE 10 MG: 50 INJECTION INTRAMUSCULAR; INTRAVENOUS; SUBCUTANEOUS at 07:20

## 2019-05-20 RX ADMIN — ONDANSETRON 4 MG: 2 INJECTION INTRAMUSCULAR; INTRAVENOUS at 07:57

## 2019-05-20 RX ADMIN — HYOSCYAMINE SULFATE 125 MCG: 0.12 TABLET, ORALLY DISINTEGRATING ORAL at 11:52

## 2019-05-20 RX ADMIN — CHLORHEXIDINE GLUCONATE 15 ML: 1.2 RINSE ORAL at 06:33

## 2019-05-20 RX ADMIN — METOCLOPRAMIDE 10 MG: 5 INJECTION, SOLUTION INTRAMUSCULAR; INTRAVENOUS at 20:41

## 2019-05-20 RX ADMIN — DEXAMETHASONE SODIUM PHOSPHATE 6 MG: 10 INJECTION INTRAMUSCULAR; INTRAVENOUS at 07:13

## 2019-05-20 RX ADMIN — HYOSCYAMINE SULFATE 125 MCG: 0.12 TABLET, ORALLY DISINTEGRATING ORAL at 20:41

## 2019-05-20 RX ADMIN — METOCLOPRAMIDE 10 MG: 5 INJECTION, SOLUTION INTRAMUSCULAR; INTRAVENOUS at 14:18

## 2019-05-20 RX ADMIN — SODIUM CHLORIDE, POTASSIUM CHLORIDE, SODIUM LACTATE AND CALCIUM CHLORIDE: 600; 310; 30; 20 INJECTION, SOLUTION INTRAVENOUS at 07:58

## 2019-05-20 RX ADMIN — FAMOTIDINE 20 MG: 10 INJECTION INTRAVENOUS at 13:53

## 2019-05-20 RX ADMIN — LIDOCAINE HYDROCHLORIDE 60 MG: 20 INJECTION, SOLUTION INFILTRATION; PERINEURAL at 07:05

## 2019-05-20 NOTE — ANESTHESIA POSTPROCEDURE EVALUATION
Patient: Jessica Mota    Procedure Summary     Date:  05/20/19 Room / Location:   [facility]  [facility]    Anesthesia Start:  0700 Anesthesia Stop:  0817    Procedure:  GASTRIC SLEEVE LAPAROSCOPIC AND HITAL HERNIA REPAIR (N/A Abdomen) Diagnosis:       BMI 50.0-59.9, adult (CMS/HCC)      (BMI 50.0-59.9, adult (CMS/HCC) [Z68.43])    Surgeon:  Stevan Hook Jr., MD Provider:  Bob Lopes MD    Anesthesia Type:  general ASA Status:  2          Anesthesia Type: general  Last vitals  BP   168/92 (05/20/19 0900)   Temp   36.9 °C (98.4 °F) (05/20/19 0900)   Pulse   93 (05/20/19 0900)   Resp   18 (05/20/19 0900)     SpO2   99 % (05/20/19 0900)     Post Anesthesia Care and Evaluation    Patient location during evaluation: bedside  Patient participation: complete - patient participated  Level of consciousness: awake  Pain score: 1  Pain management: adequate  Airway patency: patent  Anesthetic complications: No anesthetic complications    Cardiovascular status: acceptable  Respiratory status: acceptable  Hydration status: acceptable    Comments: ---------------------------               05/20/19 0900         ---------------------------   BP:          168/92         Pulse:         93           Resp:          18           Temp:   36.9 °C (98.4 °F)   SpO2:          99%         ---------------------------

## 2019-05-20 NOTE — ANESTHESIA PREPROCEDURE EVALUATION
Anesthesia Evaluation     no history of anesthetic complications:  NPO Solid Status: > 8 hours             Airway   Mallampati: II  TM distance: >3 FB  Neck ROM: full  No difficulty expected  Dental - normal exam     Pulmonary - normal exam   (+) pulmonary embolism, asthma, sleep apnea,   Cardiovascular     ECG reviewed  Rhythm: regular    (-) murmur, carotid bruits      Neuro/Psych  (+) psychiatric history,     GI/Hepatic/Renal/Endo    (+) obesity, morbid obesity,      Musculoskeletal     Abdominal  - normal exam  (+) obese,    Substance History      OB/GYN          Other                      Anesthesia Plan    ASA 2     general   (  D/W R&B of GA including but not limited to: heart, lung, liver, kidney, neurologic problems, positioning injuries, dental damage, corneal abrasion and TMJ.  .)  intravenous induction

## 2019-05-20 NOTE — OP NOTE
PREOPERATIVE DIAGNOSIS:  Morbid obesity with multiple comorbidities as referenced in the most recent history and physical.    POSTOPERATIVE DIAGNOSIS:    1:  Morbid obesity with multiple comorbidities as referenced in the most recent history and physical.  2:  Hiatal Hernia    PROCEDURES PERFORMED:  1.  Laparoscopic sleeve gastrectomy.  2.  Laparoscopic hiatal hernia repair.  3.  Tisseel application.     SURGEON:  Stevan Hook Jr., MD    ASSISTANT:  CARLITOS Moffett, [facility]    Surgery assisted and facilitated by a certified physician assistant, who directly resulted in a decreased operative time, anesthetic time, wound exposure, and possibly of an operative wound infection, thereby decreasing patient morbidity and ultimately total expenditures. The surgical assistant assisted in placement of trochars, take down of the gastrocolic omentum, short gastric vessels and dissection at the angle of His.  Also assisted in retraction of the stomach during stapling so as not to kink the gastric sleeve.  Also assisted in removing of the gastric specimen, closure of the fascial defect as well as closure of the skin incisions. They also assisted in retraction of the stomach during the hiatal hernia repair, dissection of the hernia sac and closure of the crura.      ANESTHESIA:  General endotracheal.    ESTIMATED BLOOD LOSS:   Less than 25 mL unless dictated below.    FLUIDS:  Crystalloids.    SPECIMENS: Gastric remnant    DRAINS:  None.    COUNTS:  Correct.    COMPLICATIONS:  None.    INDICATIONS:  This patient with morbid obesity and associated comorbidities presents for elective laparoscopic, possible open sleeve gastrectomy.  The patient has received medical clearance to proceed.  The patient has undergone our extensive educational process and consent process and wishes to proceed.    The standard clamp and 18 Malagasy orogastric tube were used to size the gastric sleeve. The stomach was marked in the 3 positions using  the indelible ink pen.  The 3 markings were at the angle of Hiss, the incisura and antrum using 1cm, 3cm and 5cm respectively. Green cartridges were used for a total of 4-5.      DESCRIPTION OF PROCEDURE:  The patient was brought to the operating room and placed supine upon the operating room table. SCD hose were placed.  The patient underwent uneventful general endotracheal anesthesia per the anesthesiology staff. The abdomen was prepped with ChloraPrep and draped in the usual sterile fashion.  An Ioban was used as well if not allergic.  Depending on the technique to size the sleeve, anesthesia staff either passed a 18 [place] gastric tube or a 36-[place] ViSiGi bougie into the stomach to decompress and then was pulled back to the esophagus.    A 5-10 mm transverse incision was made a few centimeters above and to the left of the umbilicus and the peritoneal cavity entered under direct camera visualization using a 5 or 10 mm 0° laparoscope and an Optiview trocar.  The abdomen was then insufflated to a pressure of 15-16 mmHg with CO2 gas.  Exploratory laparoscopy revealed no evidence of injury from the entrance technique and no significant abnormalities were seen unless addendum dictated below.  An angled laparoscope was then used.  The patient was placed in reverse Trendelenburg position.  Under direct camera visualization a 5 mm trocar was placed in the right lateral subcostal position.  A 12 mm trocar was placed in the right midabdominal position.  A 5-12 mm trocar was placed in the left midabdominal position. A Joe retractor was placed through an epigastric incision and used to elevate the left lobe of the liver.  The fat pad was elevated and the left lópez exposed.  At this point, approximately penitentiary along the greater curvature, the gastrocolic omentum was divided with the Enseal and this proceeded superiorly to the angle of His taking down the short gastric vessels.  All posterior attachments of the lesser  sac and posterior aspect of the stomach to the pancreas were taken down as well.  The left lópez was exposed along its length.  Dissection then proceeded medially taking down the greater curvature with an Enseal until just proximal to the pylorus.  The 36-French ViSiGi bougie was passed back down into the stomach by anesthesia and the sleeve gastrectomy was then performed.  The 1st load was a black, thick tissue load on the [place] Flex stapler with Veritas Sada-Strip and this was placed 3-4 cm proximal to the pylorus and up against the bougie pulling it anteriorly and posteriorly up against the bougie making sure not to narrow the incisura.  The next 4-7 loads were green with dual absorbable Veritas Sada-Strips depending on the size of the stomach. Careful attention was made to stay about 1 cm from the esophagus.  Areas of the reinforced staple line were oversewn with absorbable sutures as needed for bleeding or questionable staple lines.  The gastric specimen was then removed from the 12 mm trocar site. The specimen staple line was inspected and was intact.  The specimen was then sent off to pathology.   At this point, the sleeve was submerged under saline and using the ViSiGi bougie to insufflate the stomach, a leak test was performed.  This revealed the sleeve to be watertight, no air bubbles, no leak, and no bleeding seen from the staple lines and no significant abnormalities.  Irrigation fluid from the abdomen was then suctioned.  The gastric sleeve staple line was then treated with 4 mL Tisseel fibrin glue. The fascia at the 12 mm trocar site incision was closed with a single 0 Vicryl suture in a figure-of-eight fashion placed under direct laparoscopic camera visualization with a suture passer and tying the knot extracorporeally.  The fascia in the area was infiltrated with local anesthesia. All incisions were then infiltrated with local anesthetic. The remaining trocars were removed under direct camera  visualization with no bleeding noted from their sites.  The abdomen was desufflated of gas. The skin in each incision was closed using 4-0 antibiotic impregnated Monocryl in a subcuticular stitch followed by Dermabond.  The patient tolerated the procedure well without complication and was taken to the recovery room in stable condition.  All sponge, needle, and instrument counts were correct.    The patient was noted to have a hiatal hernia.  The phrenoesophageal membrane was opened up with electrocautery and the right and left crura were cleaned off using sharp and blunt dissection.  The hernia sac was completely dissected free.  The intra-abdominal esophagus was now approximately 3 cm in length.  The base of the left lópez was exposed to evaluate for a posterior defect and lipomas.The short gastric vessels were taken down using the Enseal device and the fundus was removed as dictated above.  The hiatus was then reapproximated with 0 ethibond sutures in a figure-of-eight fashion.

## 2019-05-20 NOTE — PLAN OF CARE
Problem: Patient Care Overview  Goal: Plan of Care Review  Outcome: Ongoing (interventions implemented as appropriate)   05/20/19 6762   Coping/Psychosocial   Plan of Care Reviewed With patient   Plan of Care Review   Progress no change   OTHER   Outcome Summary pt admitted to room and unit, ambulating in ordonez without difficulty, no nausea, c/o gas pain in left upper shoulder, no other c/o, incision sites, clean and dry dermabond intact, continue to monitor       Problem: Bariatric Surgery (Adult,Pediatric)  Goal: Signs and Symptoms of Listed Potential Problems Will be Absent, Minimized or Managed (Bariatric Surgery)  Outcome: Ongoing (interventions implemented as appropriate)    Goal: Anesthesia/Sedation Recovery  Outcome: Outcome(s) achieved Date Met: 05/20/19

## 2019-05-20 NOTE — ANESTHESIA PROCEDURE NOTES
Airway  Urgency: elective    Date/Time: 5/20/2019 7:09 AM  Airway not difficult    General Information and Staff    Patient location during procedure: OR  Anesthesiologist: Bob Lopes MD  CRNA: Lupis Zapata CRNA    Indications and Patient Condition  Indications for airway management: airway protection    Preoxygenated: yes  Mask difficulty assessment: 1 - vent by mask    Final Airway Details  Final airway type: endotracheal airway      Successful airway: ETT  Cuffed: yes   Successful intubation technique: direct laryngoscopy  Endotracheal tube insertion site: oral  Blade: Chiara  Blade size: 3  ETT size (mm): 7.0  Cormack-Lehane Classification: grade I - full view of glottis  Placement verified by: chest auscultation and capnometry   Measured from: lips  ETT to lips (cm): 21  Number of attempts at approach: 1    Additional Comments  Pre 02, sivi,, easy bvm, dlx1, dvvc, intubation x 1 attempt, +etc02, +bebs, appears atraumatic, teeth unchanged

## 2019-05-21 VITALS
DIASTOLIC BLOOD PRESSURE: 69 MMHG | HEART RATE: 75 BPM | HEIGHT: 65 IN | SYSTOLIC BLOOD PRESSURE: 135 MMHG | WEIGHT: 293 LBS | BODY MASS INDEX: 48.82 KG/M2 | OXYGEN SATURATION: 97 % | TEMPERATURE: 97.5 F | RESPIRATION RATE: 20 BRPM

## 2019-05-21 LAB
ALBUMIN SERPL-MCNC: 3.9 G/DL (ref 3.5–5.2)
ALBUMIN/GLOB SERPL: 1.3 G/DL
ALP SERPL-CCNC: 62 U/L (ref 39–117)
ALT SERPL W P-5'-P-CCNC: 65 U/L (ref 1–33)
ANION GAP SERPL CALCULATED.3IONS-SCNC: 13.5 MMOL/L
AST SERPL-CCNC: 45 U/L (ref 1–32)
BASOPHILS # BLD AUTO: 0.02 10*3/MM3 (ref 0–0.2)
BASOPHILS NFR BLD AUTO: 0.2 % (ref 0–1.5)
BILIRUB SERPL-MCNC: 0.4 MG/DL (ref 0.2–1.2)
BUN BLD-MCNC: 10 MG/DL (ref 6–20)
BUN/CREAT SERPL: 11.8 (ref 7–25)
CALCIUM SPEC-SCNC: 8.5 MG/DL (ref 8.6–10.5)
CHLORIDE SERPL-SCNC: 107 MMOL/L (ref 98–107)
CO2 SERPL-SCNC: 21.5 MMOL/L (ref 22–29)
CREAT BLD-MCNC: 0.85 MG/DL (ref 0.57–1)
CYTO UR: NORMAL
DEPRECATED RDW RBC AUTO: 47.3 FL (ref 37–54)
EOSINOPHIL # BLD AUTO: 0.01 10*3/MM3 (ref 0–0.4)
EOSINOPHIL NFR BLD AUTO: 0.1 % (ref 0.3–6.2)
ERYTHROCYTE [DISTWIDTH] IN BLOOD BY AUTOMATED COUNT: 14.5 % (ref 12.3–15.4)
GFR SERPL CREATININE-BSD FRML MDRD: 73 ML/MIN/1.73
GFR SERPL CREATININE-BSD FRML MDRD: 88 ML/MIN/1.73
GLOBULIN UR ELPH-MCNC: 2.9 GM/DL
GLUCOSE BLD-MCNC: 97 MG/DL (ref 65–99)
HCT VFR BLD AUTO: 36.3 % (ref 34–46.6)
HGB BLD-MCNC: 11.3 G/DL (ref 12–15.9)
IMM GRANULOCYTES # BLD AUTO: 0.03 10*3/MM3 (ref 0–0.05)
IMM GRANULOCYTES NFR BLD AUTO: 0.4 % (ref 0–0.5)
LAB AP CASE REPORT: NORMAL
LYMPHOCYTES # BLD AUTO: 1.26 10*3/MM3 (ref 0.7–3.1)
LYMPHOCYTES NFR BLD AUTO: 15.4 % (ref 19.6–45.3)
MAGNESIUM SERPL-MCNC: 2.2 MG/DL (ref 1.6–2.6)
MCH RBC QN AUTO: 27.9 PG (ref 26.6–33)
MCHC RBC AUTO-ENTMCNC: 31.1 G/DL (ref 31.5–35.7)
MCV RBC AUTO: 89.6 FL (ref 79–97)
MONOCYTES # BLD AUTO: 0.34 10*3/MM3 (ref 0.1–0.9)
MONOCYTES NFR BLD AUTO: 4.2 % (ref 5–12)
NEUTROPHILS # BLD AUTO: 6.5 10*3/MM3 (ref 1.7–7)
NEUTROPHILS NFR BLD AUTO: 79.7 % (ref 42.7–76)
NRBC BLD AUTO-RTO: 0 /100 WBC (ref 0–0.2)
PATH REPORT.FINAL DX SPEC: NORMAL
PATH REPORT.GROSS SPEC: NORMAL
PHOSPHATE SERPL-MCNC: 2.8 MG/DL (ref 2.5–4.5)
PLATELET # BLD AUTO: 245 10*3/MM3 (ref 140–450)
PMV BLD AUTO: 9.1 FL (ref 6–12)
POTASSIUM BLD-SCNC: 3.9 MMOL/L (ref 3.5–5.2)
PROT SERPL-MCNC: 6.8 G/DL (ref 6–8.5)
RBC # BLD AUTO: 4.05 10*6/MM3 (ref 3.77–5.28)
SODIUM BLD-SCNC: 142 MMOL/L (ref 136–145)
WBC NRBC COR # BLD: 8.16 10*3/MM3 (ref 3.4–10.8)

## 2019-05-21 PROCEDURE — 94799 UNLISTED PULMONARY SVC/PX: CPT

## 2019-05-21 PROCEDURE — 25010000002 METOCLOPRAMIDE PER 10 MG: Performed by: SURGERY

## 2019-05-21 PROCEDURE — 80053 COMPREHEN METABOLIC PANEL: CPT | Performed by: SURGERY

## 2019-05-21 PROCEDURE — 25010000002 CYANOCOBALAMIN PER 1000 MCG: Performed by: SURGERY

## 2019-05-21 PROCEDURE — 25010000002 ENOXAPARIN PER 10 MG: Performed by: SURGERY

## 2019-05-21 PROCEDURE — 25010000002 THIAMINE PER 100 MG: Performed by: SURGERY

## 2019-05-21 PROCEDURE — 84100 ASSAY OF PHOSPHORUS: CPT | Performed by: SURGERY

## 2019-05-21 PROCEDURE — 83735 ASSAY OF MAGNESIUM: CPT | Performed by: SURGERY

## 2019-05-21 PROCEDURE — 85025 COMPLETE CBC W/AUTO DIFF WBC: CPT | Performed by: SURGERY

## 2019-05-21 PROCEDURE — 25010000002 KETOROLAC TROMETHAMINE PER 15 MG: Performed by: SURGERY

## 2019-05-21 RX ORDER — ONDANSETRON 4 MG/1
4 TABLET, FILM COATED ORAL EVERY 6 HOURS PRN
Qty: 10 TABLET | Refills: 0 | Status: SHIPPED | OUTPATIENT
Start: 2019-05-21 | End: 2019-11-20

## 2019-05-21 RX ADMIN — ENOXAPARIN SODIUM 40 MG: 40 INJECTION SUBCUTANEOUS at 08:54

## 2019-05-21 RX ADMIN — CYANOCOBALAMIN 1000 MCG: 1000 INJECTION, SOLUTION INTRAMUSCULAR; SUBCUTANEOUS at 08:55

## 2019-05-21 RX ADMIN — SODIUM CHLORIDE, POTASSIUM CHLORIDE, SODIUM LACTATE AND CALCIUM CHLORIDE 150 ML/HR: 600; 310; 30; 20 INJECTION, SOLUTION INTRAVENOUS at 08:00

## 2019-05-21 RX ADMIN — FAMOTIDINE 20 MG: 10 INJECTION INTRAVENOUS at 08:55

## 2019-05-21 RX ADMIN — FOLIC ACID 250 ML/HR: 5 INJECTION, SOLUTION INTRAMUSCULAR; INTRAVENOUS; SUBCUTANEOUS at 00:10

## 2019-05-21 RX ADMIN — METOCLOPRAMIDE 10 MG: 5 INJECTION, SOLUTION INTRAMUSCULAR; INTRAVENOUS at 03:04

## 2019-05-21 RX ADMIN — SODIUM CHLORIDE, PRESERVATIVE FREE 3 ML: 5 INJECTION INTRAVENOUS at 08:55

## 2019-05-21 RX ADMIN — HYOSCYAMINE SULFATE 125 MCG: 0.12 TABLET, ORALLY DISINTEGRATING ORAL at 08:55

## 2019-05-21 RX ADMIN — ALBUTEROL SULFATE 2.5 MG: 2.5 SOLUTION RESPIRATORY (INHALATION) at 08:48

## 2019-05-21 RX ADMIN — METOCLOPRAMIDE 10 MG: 5 INJECTION, SOLUTION INTRAMUSCULAR; INTRAVENOUS at 08:55

## 2019-05-21 RX ADMIN — KETOROLAC TROMETHAMINE 30 MG: 30 INJECTION, SOLUTION INTRAMUSCULAR; INTRAVENOUS at 08:54

## 2019-05-21 NOTE — PROGRESS NOTES
Case Management Discharge Note    Final Note: Home no needs     Destination      No service has been selected for the patient.      Durable Medical Equipment      No service has been selected for the patient.      Dialysis/Infusion      No service has been selected for the patient.      Home Medical Care      No service has been selected for the patient.      Therapy      No service has been selected for the patient.      Community Resources      No service has been selected for the patient.             Final Discharge Disposition Code: 01 - home or self-care

## 2019-05-21 NOTE — PAYOR COMM NOTE
"Jessica Corrales (44 y.o. Female) PLEASE SEE ATTACHED DC SUMMARY      REF#[id number]    THANK YOU    LAURA RAMIREZ [facility]          Date of Birth Social Security Number Address Home Phone MRN    [id number]  [id number] [address] [phone] [id number]    Christianity Marital Status          Unknown Single       Admission Date Admission Type Admitting Provider Attending Provider Department, Room/Bed    5/20/19 Elective Stevan Hook Jr., MD  [address], [address]    Discharge Date Discharge Disposition Discharge Destination        5/21/2019 Home or Self Care              Attending Provider:  (none)   Allergies:  Levofloxacin, Penicillins    Isolation:  None   Infection:  None   Code Status:  CPR    Ht:  165.1 cm (65\")   Wt:  141 kg (311 lb 15.2 oz)    Admission Cmt:  None   Principal Problem:  BMI 50.0-59.9, adult (CMS/[facility]) [Z68.43] More...                 Active Insurance as of 5/20/2019     Primary Coverage     Payor Plan Insurance Group Employer/Plan Group    [employer] PPO [id number]     Payor Plan Address Payor Plan Phone Number Payor Plan Fax Number Effective Dates    [address] [phone]  1/1/2019 - None Entered    [place] [address]       Subscriber Name Subscriber Birth Date Member ID       JESSICA CORRALES 1975 [id number]                 Emergency Contacts      (Rel.) Home Phone Work Phone Mobile Phone    SOTOJACQUELINEMARISOL (Sister) -- -- [phone]    BLANCA CRUZ (Sister) -- -- [phone]               Discharge Summary      Lina Louis APRN at 5/21/2019  9:20 AM     Attestation signed by Stevan Hook Jr., MD at 5/21/2019 12:09 PM    Patient was seen and evaluated by me.  I agree with above                    Discharge Summary    Patient name: Jessica Corrales    Medical record number: [id number]    Admission date: 5/20/2019  Discharge date:      Attending physician: Dr." "Stevan Hook    Primary care physician: Juan Gaitan MD    Referring physician: Stevan Hook Jr., MD  [address] [address]    Condition on discharge: Stable    Primary Diagnoses:  Morbid obesity with co-morbidities    Operative Procedure:  Laparoscopic gastric sleeve w/ HHR     Jessica Mota  is post op day one status post procedure listed. Patient denies shortness of air and lower extremity pain. Feels better than yesterday. No vomiting this am. Ambulating well and using incentive spirometer.          /69 (BP Location: Right arm, Patient Position: Lying)   Pulse 75   Temp 97.5 °F (36.4 °C) (Oral)   Resp 20   Ht 165.1 cm (65\")   Wt (!) 141 kg (311 lb 15.2 oz)   SpO2 95%   BMI 51.91 kg/m²      General:  alert, appears stated age, cooperative and no distress   Abdomen: soft, bowel sounds active, appropriate tenderness   Incision:   healing well, no drainage, no erythema, no hernia, no seroma, no swelling, no dehiscence, incision well approximated   Heart: Regular rate   Lungs: Clear to auscultation bilaterally     I reviewed the patient's new clinical results.     Lab Results (last 24 hours)     Procedure Component Value Units Date/Time    Comprehensive Metabolic Panel [[id number]]  (Abnormal) Collected:  05/21/19 0353    Specimen:  Blood Updated:  05/21/19 0456     Glucose 97 mg/dL      BUN 10 mg/dL      Creatinine 0.85 mg/dL      Sodium 142 mmol/L      Potassium 3.9 mmol/L      Chloride 107 mmol/L      CO2 21.5 mmol/L      Calcium 8.5 mg/dL      Total Protein 6.8 g/dL      Albumin 3.90 g/dL      ALT (SGPT) 65 U/L      AST (SGOT) 45 U/L      Alkaline Phosphatase 62 U/L      Total Bilirubin 0.4 mg/dL      eGFR Non African Amer 73 mL/min/1.73      eGFR  African Amer 88 mL/min/1.73      Globulin 2.9 gm/dL      A/G Ratio 1.3 g/dL      BUN/Creatinine Ratio 11.8     Anion Gap 13.5 mmol/L     Narrative:       GFR Normal >60  Chronic Kidney Disease <60  Kidney Failure <15    " Phosphorus [[id number]]  (Normal) Collected:  05/21/19 0353    Specimen:  Blood Updated:  05/21/19 0454     Phosphorus 2.8 mg/dL     Magnesium [[id number]]  (Normal) Collected:  05/21/19 0353    Specimen:  Blood Updated:  05/21/19 0454     Magnesium 2.2 mg/dL     CBC & Differential [[id number]] Collected:  05/21/19 0353    Specimen:  Blood Updated:  05/21/19 0433    Narrative:       The following orders were created for panel order CBC & Differential.  Procedure                               Abnormality         Status                     ---------                               -----------         ------                     CBC Auto Differential[[id number]]        Abnormal            Final result                 Please view results for these tests on the individual orders.    CBC Auto Differential [[id number]]  (Abnormal) Collected:  05/21/19 0353    Specimen:  Blood Updated:  05/21/19 0433     WBC 8.16 10*3/mm3      RBC 4.05 10*6/mm3      Hemoglobin 11.3 g/dL      Hematocrit 36.3 %      MCV 89.6 fL      MCH 27.9 pg      MCHC 31.1 g/dL      RDW 14.5 %      RDW-SD 47.3 fl      MPV 9.1 fL      Platelets 245 10*3/mm3      Neutrophil % 79.7 %      Lymphocyte % 15.4 %      Monocyte % 4.2 %      Eosinophil % 0.1 %      Basophil % 0.2 %      Immature Grans % 0.4 %      Neutrophils, Absolute 6.50 10*3/mm3      Lymphocytes, Absolute 1.26 10*3/mm3      Monocytes, Absolute 0.34 10*3/mm3      Eosinophils, Absolute 0.01 10*3/mm3      Basophils, Absolute 0.02 10*3/mm3      Immature Grans, Absolute 0.03 10*3/mm3      nRBC 0.0 /100 WBC     Tissue Pathology Exam [[id number]] Collected:  05/20/19 0640    Specimen:  Tissue from Stomach Updated:  05/20/19 1012             Assessment:      Doing well postoperatively.      Plan:   1. Continue Stage 1 diet  2. Continue with ambulation and Incentive spirometry  3. Plan for d/c home    Patient was seen and examined by Dr. Hook.    Hospital Course: The patient is a very pleasant 44 y.o. female  that was admitted to the hospital with morbid obesity with co-morbidities. Patient underwent laparoscopic sleeve gastrectomy with hiatal hernia repair (see OP note) without complication. The patient was then admitted to the bariatric unit per protocol where they remained stable. POD #1 she was started on a stage 1 bariatric diet which she tolerated so she was able to be discharged home in good condition.        Discharge medications:      Discharge Medications      New Medications      Instructions Start Date   HYDROcodone-acetaminophen 7.5-325 MG/15ML solution  Commonly known as:  HYCET   15 mL, Oral, Every 6 Hours PRN      ondansetron 4 MG tablet  Commonly known as:  ZOFRAN   4 mg, Oral, Every 6 Hours PRN         Continue These Medications      Instructions Start Date   fluticasone 50 MCG/ACT nasal spray  Commonly known as:  FLONASE   2 sprays, Nasal, Daily      folic acid-vit B6-vit B12 2.5-25-1 MG tablet tablet  Commonly known as:  FOLBEE   1 tablet, Oral, Daily      furosemide 40 MG tablet  Commonly known as:  LASIX   40 mg, Oral, As Needed      MULTIVITAMIN ADULT PO   1 tablet/day, Oral, BARIATRIC VITAMIN      ursodiol 300 MG capsule  Commonly known as:  ACTIGALL   300 mg, Oral, 2 Times Daily      vitamin D 61735 units capsule capsule  Commonly known as:  ERGOCALCIFEROL   50,000 Units, Oral, Every 7 Days         Stop These Medications    CHLORHEXIDINE GLUCONATE CLOTH EX     MAGNESIUM PO            Discharge instructions:  Per Bariatric manual; per our protocol      Follow-up appointment: Follow up with Dr. Hook in the office as scheduled.  If not already scheduled call for appointment at [phone].    Electronically signed by Stevan Hook Jr., MD at 5/21/2019 12:09 PM

## 2019-05-21 NOTE — DISCHARGE INSTRUCTIONS
GOING HOME AFTER GASTRIC SLEEVE/ GASTRIC BYPASS SURGERY  [facility] Weight Loss: Post-Operative Information/Instructions  Stevan Hook Jr., MD  General Patient Instructions for Discharge   - Call Surgeon's office at [phone] for follow-up appointment.    - Be sure you, the patient, have a follow-up appointment to be seen within three (3) days after discharge. If not, please call [phone] to schedule an appointment. If you are discharged on a Saturday or Sunday, please call Monday to schedule the appointment.  - Contact the Surgeon at [phone] for any questions or concerns, including temperature greater than or equal to 101F, shortness of breath, leg swelling, redness at incision sites, nausea, vomiting, chills, or problems or questions.    - Follow the Gastric Stage 1 Diet    à Clear liquids, room temperature, sugar-free, caffeine-free, non-carbonated, 70 grams of protein, No Straws.  - You may shower. No tub bath for 2 weeks.  - No lifting, pushing, pulling, or tugging >25 pounds for 3 weeks.  - Ambulate 4 x per day minimum, increase distance daily.  - For the next several weeks, you are at an increased risk for blood clot formation. Therefore, you should walk regularly. You should not sit for prolonged periods of time, more than 45 minutes, without getting up and walking for 5-10 minutes. This includes any car rides, including the drive home from the hospital. If driving any distance greater than 30 miles over the next two (2) weeks, stop every 30-45 minutes and walk for 5-10 minutes each time.  - Continue using Incentive Spirometer and coughing exercises at least every two (2) hours while awake for one week.  - If you, the patient, use CPAP/BIPAP for diagnosis of sleep apnea, you may resume use tonight at home.  - No driving or operating machinery allowed while taking narcotic (prescription) pain medication, and until you feel comfortable forcefully applying the brakes if needed. (This  usually takes more than 3 days.)    - Make an appointment with your Primary Care Physician within one week post-op to look at your home medications for possible changes or discontinuity.   Medications  - The nurse will provide a list of medications for you to continue at home   - If you received a Lovenox (Enoxaparin) or Apixiban (Eliquis) prescription at pre-op visit with Surgeon, start taking the medicine the morning after discharge unless directed otherwise.    - If you were prescribed Lovenox (Enoxaparin), review the education/teaching material/video with the nurse.    - Take post op pain meds as prescribed as needed.   - Continue Foltx until finished.   - Start Actigall (Ursodiol) one (1) week after surgery if patient still has gallbladder. You should have been given a prescription at your pre-op visit. Contact the office if you do not have the prescription.   - Start bariatric vitamin regimen as instructed in pre-op education with bariatric coordinator.    - Zegerid or Prilosec OTC (or generic) by mouth once daily for four (4) weeks unless you are already taking a proton pump inhibitor as home medication. Follow dosing instructions on package.   Nausea/Vomiting:  The following are possible causes for nausea/vomiting:  - Drinking too much or too fast.  - Sinus drainage/post nasal drip for allergy sufferers (you may take Sudafed, Claritin, Tylenol Sinus/Allergy, or other decongestants and nose sprays to help with this discomfort).  - Low blood sugar (sweating, shaky, irritable, weakness, dizzy or tunnel-vision) - treatment is to sip 100% fruit juice - no sugar added until symptoms subside.  - Acid in fruit juice - (may dilute with water or avoid).  - Eating or drinking something that is not on clear liquid (stage 1) diet.  Any nausea/vomiting that prohibits you from keeping fluids down for greater than 24 hours requires a call to the surgeon's office.  Urine:  Use your urine color as a guide to determine if you  are drinking enough fluid. The darker the urine, the more fluids you need to drink. Urine should be clear to light yellow if you are getting enough fluid. If you should experience frequency, burning or pain with urination, blood in urine, contact us or your primary care physician for possible UTI (urinary tract infection), which could require antibiotics (liquid preferred).  Bowel Movements:  You may not have a bowel movement for 2-5 days after going home. You may then experience liquid, runny or loose stools for approximately 3-4 weeks following surgery. This would require you to drink even more fluids to prevent dehydration. Some patients may experience constipation, which can be treated with increased fluids, drinking warm liquids, increased activity and the use of a Fleets Enema, Milk of Magnesia, or suppositories. The first couple of bowel movements could be bloody, tarry black or dark maroon in color. This is OK as long as the stool returns to a normal color in 1-2 days. If however, you have frequent or a large amount of bloody or tarry black stools and/or become light-headed or dizzy, you may be bleeding and require urgent attention. Please call us right away.  Abdominal Incisions:  You will have small incisions. Do not scrub incisions, but allow the warm, soapy water to run over the incisions, rinse well, and pat dry. You may use any brand of anti-bacterial soap. Do not use Peroxide or Neosporin type ointments on sites, unless instructed to do so by a surgeon or nurse. Monitor daily for signs/symptoms of infection, which might include: drainage with a foul odor, pain, redness, swelling or heat at the incision sight; fever, body aches and chills. If you suspect infection or have a fever, give us a call.  Pain:  You will be given a prescription for pain medication to control your pain. If you feel the dose is too strong, you may take half the ordered dose, or you may take Tylenol adult liquid per package  instructions for minor pain. Do not take any medications that contain aspirin or aspirin products.  Do not take medications like: Motrin, Aleve, Ibuprofen, Advil, Naproxen, Celebrex, Daypro, Bextra, Meloxicam or other medications commonly used for arthritis or joint pain.  No steroids or cortisone injections. There may be pain, which should improve every few days. Pain should not suddenly get worse or more intense. Pain that suddenly changes and is constant and severe should be called in to the surgeon's office. Any sudden pain in the lower extremities with associated warmth and redness should be called in to the surgeon's office immediately. Do not rub or massage this area, as it could be a blood clot.  Diet:  Remain on the clear liquid diet (stage 1) per your  which includes 70 grams of protein each day, sugar free, non carbonated and no straws. Day 1 is the day of surgery. If you are tolerating the stage 1 diet, you may then proceed to stage 2 diet, as instructed in the . Do not progress to the stage 2 diet if you are having nausea/vomiting. Refer to the Basic Nutrition and Food Principles guide.  Medications:  The nurse will let you know which medications you will need to continue once you go home. Do not take any medications that are extended or time released if you had the gastric bypass procedure, OK to take if you had the gastric sleeve procedure. Large capsules can be opened and diluted with clear liquids. Check with your physician or pharmacist as to which pills may be crushed and which capsules may be opened and diluted safely. Continue taking Foltx as surgeon orders. If you still have your gall bladder and were prescribed Actigall (Ursodiol), you may start this medication one week after your surgery. You will remain on Actigall (Ursodiol) for approximately 6 months. The dose is 1 pill, 2 times each day for 6 months.  Activity:  Continue your deep breathing and coughing  exercises with your Incentive Spirometer breathing device at least every 2 hours while awake (10 repetitions each time) for one week. May use CPAP. This will help to prevent respiratory problems such as pneumonia. No lifting, pulling or tugging anything over 25 pounds for 3 weeks after surgery. You may shower but no tub baths, hot tubs or swimming for 2 weeks. Moderate walking is recommended every 2 hours and at least 4 times per day minimum, increase distance daily. Further exercise will be discussed at the first post-op visit. No driving or operating machinery allow until off narcotic pain medication and until you feel comfortable forcefully applying the brakes (usually takes 3 or more days). For the next few weeks you are at an increased risk for blood clot formation. Therefore you should walk regularly and you should not sit for prolonged periods of time, more than 45 minutes without getting up and walking for 5-10 minutes. This includes car rides. Including riding home from the hospital. If riding a distance greater than 30 miles over the next 2 weeks stop every 30-45 minutes and walk 5-10 mintues each time. No tanning bed use for 8 weeks after surgery and in general, not recommended due to the increased risk for skin cancer. Incisions will burn/blister very badly with tanning bed use.  Illness:  Your primary care physician should treat general illness such as ear infections, sinus infections, and viral type illnesses, etc. Medications prescribed should be liquid/elixir form when possible, for the first 30 days.  General:  In general, it is recommended that you weigh yourself no more than once per week. Let the weight come off you and concentrate on more important things. Remember the weight was not gained overnight, nor will it be lost overnight. Gastric Bypass/ Gastric Sleeve weight loss will continue over a period of 12-18 months. Do not  yourself according to how others are doing after surgery, as this  will cause unnecessary discouragement.  THE ABOVE ARE GENERAL GUIDELINES TO ASSIST YOU ONCE HOME, IF YOU ARE IN DOUBT, OR YOU HAVE ANY QUESTIONS, CALL US AT THE NUMBERS LISTED BELOW.  IN THE EVENT OF SUDDEN CHEST PAIN, SHORTNESS OF BREATH, OR ANY LIFE THREATENING CONDITION, CALL 911.  Any time you are evaluated or admitted to another facility, please have someone notify the surgeon's office.  Supplements:  70 grams of protein taken EVERY DAY. Remember to drink at least 64 ounces of fluid a day, sipping slowly early on. Increase this amount during the summertime. Sipping slowly will not stretch your new stomach. Drinking too fast or gulping liquids will cause brief discomfort and early could cause staple line disruption (leak). With eating, tiny bites, then chew, chew, chew, and swallow. Lay your fork/spoon down for 2-3 minutes, and then take your next bite. Your pouch will tell you within 1-2 bites if it is going to tolerate what you are eating.   Protein Vendors:  Refer to protein vendors' handout from consult class. You can always find protein drinks at the bariatric office, grocery stores, [employer], drug [employer], [employer], health food stores, and on the Internet. Find one high in protein (15-30 grams per serving) and low carb (less than 18 grams per serving).  Now is a great time to re-read your . Please review specific instructions given to you at discharge by your physician (surgeon).  HOW/WHEN TO CONTACT US:  It is imperative that you contact us with any of the following:    Ÿ fever greater than 101 degrees  Ÿ shortness of breath  Ÿ leg swelling  Ÿ body aches  Ÿ shaking chills  Ÿ nausea and vomitting  Ÿ pain that has worsened  Ÿ redness at incision sites  Ÿ pus or foul smelling drainage from an incision or wound  Ÿ inability to keep fluids down for more than a day  Ÿ any other condition you feel needs our attention.  [facility] Bariatric: [phone] call this number anytime 24 hours  a day / 7 days a week.  Teach-back Questions to be answered by the patient prior to discharge.   What complications would prompt you to call your doctor when you return home? _________________    What is the purpose of your prescribed medication? ________________  What are some potential side effects of the medications you will be taking at home? _______________

## 2019-05-21 NOTE — PROGRESS NOTES
Discharge Planning Assessment  [facility]     Patient Name: Jessica Mota  MRN: [id number]  Today's Date: 5/21/2019    Admit Date: 5/20/2019    Discharge Needs Assessment    No documentation.       Discharge Plan     Row Name 05/21/19 1032       Plan    Plan  Home no needs    Plan Comments  Spoke to pt at bedside r/t EPIC prompt.  Explained the role of CCP.  Pt denies needs  Pt has no DME need.  She denies any one hurting or harming her.  She is able to afford her medications.  Pt has own transportation.  CCP unable to find a need from pt.  Encouraged to contact CCP for any needs or concerns        Destination      No service coordination in this encounter.      Durable Medical Equipment      No service coordination in this encounter.      Dialysis/Infusion      No service coordination in this encounter.      Home Medical Care      No service coordination in this encounter.      Therapy      No service coordination in this encounter.      Community Resources      No service coordination in this encounter.        Expected Discharge Date and Time     Expected Discharge Date Expected Discharge Time    May 21, 2019         Demographic Summary    No documentation.       Functional Status    No documentation.       Psychosocial    No documentation.       Abuse/Neglect    No documentation.       Legal    No documentation.       Substance Abuse    No documentation.       Patient Forms    No documentation.           Tiana Le RN

## 2019-05-21 NOTE — PLAN OF CARE
Problem: Patient Care Overview  Goal: Plan of Care Review  Outcome: Ongoing (interventions implemented as appropriate)   05/21/19 0624   Coping/Psychosocial   Plan of Care Reviewed With patient   Plan of Care Review   Progress improving   OTHER   Outcome Summary VSS. AMBULATES WELL IN HALLWAY. C/O INTERMITTENT GAS PAINS WITH ADEQUATE RELIEF FROM ROUTINE MEDS AND CHEWING GUM. DENIED NEED FOR  PRN MEDS.       Problem: Bariatric Surgery (Adult,Pediatric)  Goal: Signs and Symptoms of Listed Potential Problems Will be Absent, Minimized or Managed (Bariatric Surgery)  Outcome: Ongoing (interventions implemented as appropriate)

## 2019-05-21 NOTE — NURSING NOTE
Pt says she is driving herself home. She hasn't taken any narcotics after surgery. She is steady on her feet ambulating in hallway by herself. Dr. Hook ok with patient driving home.

## 2019-05-21 NOTE — DISCHARGE SUMMARY
"Discharge Summary    Patient name: Jessica Mota    Medical record number: [id number]    Admission date: 5/20/2019  Discharge date:      Attending physician: Dr. Stevan Hook    Primary care physician: Juan Gaitan MD    Referring physician: Stevan Hook Jr., MD  [address] [address]    Condition on discharge: Stable    Primary Diagnoses:  Morbid obesity with co-morbidities    Operative Procedure:  Laparoscopic gastric sleeve w/ HHR     Jessica Mota  is post op day one status post procedure listed. Patient denies shortness of air and lower extremity pain. Feels better than yesterday. No vomiting this am. Ambulating well and using incentive spirometer.          /69 (BP Location: Right arm, Patient Position: Lying)   Pulse 75   Temp 97.5 °F (36.4 °C) (Oral)   Resp 20   Ht 165.1 cm (65\")   Wt (!) 141 kg (311 lb 15.2 oz)   SpO2 95%   BMI 51.91 kg/m²     General:  alert, appears stated age, cooperative and no distress   Abdomen: soft, bowel sounds active, appropriate tenderness   Incision:   healing well, no drainage, no erythema, no hernia, no seroma, no swelling, no dehiscence, incision well approximated   Heart: Regular rate   Lungs: Clear to auscultation bilaterally     I reviewed the patient's new clinical results.     Lab Results (last 24 hours)     Procedure Component Value Units Date/Time    Comprehensive Metabolic Panel [[id number]]  (Abnormal) Collected:  05/21/19 0353    Specimen:  Blood Updated:  05/21/19 0456     Glucose 97 mg/dL      BUN 10 mg/dL      Creatinine 0.85 mg/dL      Sodium 142 mmol/L      Potassium 3.9 mmol/L      Chloride 107 mmol/L      CO2 21.5 mmol/L      Calcium 8.5 mg/dL      Total Protein 6.8 g/dL      Albumin 3.90 g/dL      ALT (SGPT) 65 U/L      AST (SGOT) 45 U/L      Alkaline Phosphatase 62 U/L      Total Bilirubin 0.4 mg/dL      eGFR Non African Amer 73 mL/min/1.73      eGFR  African Amer 88 mL/min/1.73      Globulin 2.9 gm/dL      A/G " Ratio 1.3 g/dL      BUN/Creatinine Ratio 11.8     Anion Gap 13.5 mmol/L     Narrative:       GFR Normal >60  Chronic Kidney Disease <60  Kidney Failure <15    Phosphorus [[id number]]  (Normal) Collected:  05/21/19 0353    Specimen:  Blood Updated:  05/21/19 0454     Phosphorus 2.8 mg/dL     Magnesium [[id number]]  (Normal) Collected:  05/21/19 0353    Specimen:  Blood Updated:  05/21/19 0454     Magnesium 2.2 mg/dL     CBC & Differential [[id number]] Collected:  05/21/19 0353    Specimen:  Blood Updated:  05/21/19 0433    Narrative:       The following orders were created for panel order CBC & Differential.  Procedure                               Abnormality         Status                     ---------                               -----------         ------                     CBC Auto Differential[[id number]]        Abnormal            Final result                 Please view results for these tests on the individual orders.    CBC Auto Differential [[id number]]  (Abnormal) Collected:  05/21/19 0353    Specimen:  Blood Updated:  05/21/19 0433     WBC 8.16 10*3/mm3      RBC 4.05 10*6/mm3      Hemoglobin 11.3 g/dL      Hematocrit 36.3 %      MCV 89.6 fL      MCH 27.9 pg      MCHC 31.1 g/dL      RDW 14.5 %      RDW-SD 47.3 fl      MPV 9.1 fL      Platelets 245 10*3/mm3      Neutrophil % 79.7 %      Lymphocyte % 15.4 %      Monocyte % 4.2 %      Eosinophil % 0.1 %      Basophil % 0.2 %      Immature Grans % 0.4 %      Neutrophils, Absolute 6.50 10*3/mm3      Lymphocytes, Absolute 1.26 10*3/mm3      Monocytes, Absolute 0.34 10*3/mm3      Eosinophils, Absolute 0.01 10*3/mm3      Basophils, Absolute 0.02 10*3/mm3      Immature Grans, Absolute 0.03 10*3/mm3      nRBC 0.0 /100 WBC     Tissue Pathology Exam [[id number]] Collected:  05/20/19 0640    Specimen:  Tissue from Stomach Updated:  05/20/19 1012             Assessment:      Doing well postoperatively.      Plan:   1. Continue Stage 1 diet  2. Continue with ambulation and  Incentive spirometry  3. Plan for d/c home    Patient was seen and examined by Dr. Hook.    Hospital Course: The patient is a very pleasant 44 y.o. female that was admitted to the hospital with morbid obesity with co-morbidities. Patient underwent laparoscopic sleeve gastrectomy with hiatal hernia repair (see OP note) without complication. The patient was then admitted to the bariatric unit per protocol where they remained stable. POD #1 she was started on a stage 1 bariatric diet which she tolerated so she was able to be discharged home in good condition.        Discharge medications:      Discharge Medications      New Medications      Instructions Start Date   HYDROcodone-acetaminophen 7.5-325 MG/15ML solution  Commonly known as:  HYCET   15 mL, Oral, Every 6 Hours PRN      ondansetron 4 MG tablet  Commonly known as:  ZOFRAN   4 mg, Oral, Every 6 Hours PRN         Continue These Medications      Instructions Start Date   fluticasone 50 MCG/ACT nasal spray  Commonly known as:  FLONASE   2 sprays, Nasal, Daily      folic acid-vit B6-vit B12 2.5-25-1 MG tablet tablet  Commonly known as:  FOLBEE   1 tablet, Oral, Daily      furosemide 40 MG tablet  Commonly known as:  LASIX   40 mg, Oral, As Needed      MULTIVITAMIN ADULT PO   1 tablet/day, Oral, BARIATRIC VITAMIN      ursodiol 300 MG capsule  Commonly known as:  ACTIGALL   300 mg, Oral, 2 Times Daily      vitamin D 52504 units capsule capsule  Commonly known as:  ERGOCALCIFEROL   50,000 Units, Oral, Every 7 Days         Stop These Medications    CHLORHEXIDINE GLUCONATE CLOTH EX     MAGNESIUM PO            Discharge instructions:  Per Bariatric manual; per our protocol      Follow-up appointment: Follow up with Dr. Hook in the office as scheduled.  If not already scheduled call for appointment at [phone].

## 2019-05-22 ENCOUNTER — NURSE TRIAGE (OUTPATIENT)
Dept: CALL CENTER | Facility: HOSPITAL | Age: 44
End: 2019-05-22

## 2019-05-22 NOTE — TELEPHONE ENCOUNTER
"Caller asking what she can cleanse with before giving lovenox shot.     Reason for Disposition  • Health Information question, no triage required and triager able to answer question    Additional Information  • Negative: [1] Caller is not with the adult (patient) AND [2] reporting urgent symptoms  • Negative: Lab result questions  • Negative: Medication questions  • Negative: Caller cannot be reached by phone  • Negative: Caller has already spoken to PCP or another triager  • Negative: RN needs further essential information from caller in order to complete triage  • Negative: Requesting regular office appointment  • Negative: [1] Caller requesting NON-URGENT health information AND [2] PCP's office is the best resource  • Negative: General information question, no triage required and triager able to answer question    Answer Assessment - Initial Assessment Questions  1. REASON FOR CALL or QUESTION: \"What is your reason for calling today?\" or \"How can I best help you?\" or \"What question do you have that I can help answer?\"      What do I clean the site with before I give my lovenox shot?    Protocols used: INFORMATION ONLY CALL-ADULT-      "

## 2019-05-23 ENCOUNTER — OFFICE VISIT (OUTPATIENT)
Dept: BARIATRICS/WEIGHT MGMT | Facility: CLINIC | Age: 44
End: 2019-05-23

## 2019-05-23 VITALS
HEIGHT: 65 IN | RESPIRATION RATE: 18 BRPM | DIASTOLIC BLOOD PRESSURE: 98 MMHG | HEART RATE: 96 BPM | WEIGHT: 293 LBS | TEMPERATURE: 97.5 F | BODY MASS INDEX: 48.82 KG/M2 | SYSTOLIC BLOOD PRESSURE: 153 MMHG

## 2019-05-23 PROCEDURE — 99024 POSTOP FOLLOW-UP VISIT: CPT | Performed by: NURSE PRACTITIONER

## 2019-05-23 NOTE — PROGRESS NOTES
[facility] BARIATRIC SURGERY  [address] [facility] [address]  [facility] [address]  [phone]  [address] [address]  [phone]  Dept: [phone]  5/23/2019      Jessica Mota.  [id number]  [id number]  1975  female      Chief Complaint   Patient presents with   • Follow-up     1 week post op sleeve       BH Post-Op Bariatric Surgery:   Jessica Mota is status post laparopscopic Laparoscopic Sleeve/HH procedure, performed on 5/20/19.     HPI:   Today's weight is (!) 138 kg (305 lb) pounds, today's BMI is Body mass index is 50.75 kg/m²., she has a  loss of 27 pounds since the last visit and her weight loss since surgery is 27 pounds. The patient reports a decreased portion size and loss of appetite.  Jessica Mota denies nausea, vomiting, dysphagia, or heartburn. The patient c/o post-op pain that is improving. she is doing well with protein and water intake so far. Taking their vitamins, walking and using IS. Denies fevers, chills, chest pain or shortness of air.      Diet and Exercise: Diet history reviewed and discussed with the patient. Weight loss/gains to date discussed with the patient. No carbonated beverage consumption and exercising regularly- walking frequently.   Supplements: multivitamins, B-12, calcium, iron, B-1 and Vitamin D.     Is getting 2-3 protein supplements in per day. Has been using BA shakes.     Review of Systems   Constitutional: Positive for appetite change. Negative for fatigue and unexpected weight change.   HENT: Negative.    Eyes: Negative.    Respiratory: Negative.    Cardiovascular: Negative.  Negative for leg swelling.   Gastrointestinal: Negative for abdominal distention, abdominal pain, constipation, diarrhea, nausea and vomiting.   Genitourinary: Negative for difficulty urinating, frequency and urgency.   Musculoskeletal: Negative for back pain.   Skin: Negative.    Psychiatric/Behavioral: Negative.     All other systems reviewed and are negative.      Patient Active Problem List   Diagnosis   • SHANDA (obstructive sleep apnea)   • Vitamin D insufficiency   • Thyroid nodule   • Pulmonary embolism (CMS/HCC)   • Depression with anxiety   • Edema   • Dietary counseling   • Exercise counseling   • Labile blood pressure   • Heartburn   • Multiple joint pain   • Prediabetes   • BMI 50.0-59.9, adult (CMS/HCC)       The following portions of the patient's history were reviewed and updated as appropriate: allergies, current medications, past family history, past medical history, past social history, past surgical history and problem list.    Vitals:    05/23/19 1012   BP: 153/98   Pulse: 96   Resp: 18   Temp: 97.5 °F (36.4 °C)   Manual pulse check: 84bom after sitting for 10 minutes    Physical Exam   Constitutional: She appears well-developed and well-nourished.   Neck: No thyromegaly present.   Cardiovascular: Normal rate, regular rhythm and normal heart sounds.   Pulmonary/Chest: Effort normal and breath sounds normal. No respiratory distress. She has no wheezes.   Abdominal: Soft. Bowel sounds are normal. She exhibits no distension. There is no tenderness. There is no guarding. No hernia.   Musculoskeletal: She exhibits no edema or tenderness.   Neurological: She is alert.   Skin: Skin is warm and dry. No rash noted. No erythema.   Psychiatric: She has a normal mood and affect. Her behavior is normal.   Nursing note and vitals reviewed.      Assessment:   Post-op, the patient is doing well.     Encounter Diagnosis   Name Primary?   • BMI 50.0-59.9, adult (CMS/HCC) Yes       Plan:   Reviewed with patient the importance of following the manual for diet progression. Increase activity as tolerated. Continue increasing daily intake of protein and water.   Return to work: the patient is to return to 3 weeks from their surgery date with no restrictions unless they develop medical problems in which we will see them back in the  office. They received a note in our office today with their return to work date.  Activity restrictions: no lifting, pushing or pulling over 25lbs for 3 weeks.   Recommended patient be sure to get at least 70 grams of protein per day. Discussed with the patient the recommended amount of water per day to intake. Reviewed vitamin requirements. Be sure to do routine exercise and increase activity as tolerated. No asa, nsaids or steroids for 8 weeks. Patient may use miralax as needed if necessary.     Instructions / Recommendations: dietary counseling recommended, recommended a daily protein intake of  grams, vitamin supplement(s) recommended, recommended exercising at least 150 minutes per week, behavior modifications recommended and instructed to call the office for concerns, questions, or problems.     The patient was instructed to follow up at one month follow up appt.     The patient was counseled regarding post op bariatric manual

## 2019-05-25 ENCOUNTER — NURSE TRIAGE (OUTPATIENT)
Dept: CALL CENTER | Facility: HOSPITAL | Age: 44
End: 2019-05-25

## 2019-05-25 NOTE — TELEPHONE ENCOUNTER
She is calling about her diet regarding Gastric sleeve laparoscopic. Explained that she needs to follow the instructions entirely. If she has questions she needs to talk to her surgeon.     Reason for Disposition  • [1] Caller has NON-URGENT question AND [2] triager unable to answer question    Additional Information  • Negative: Sounds like a life-threatening emergency to the triager  • Negative: Chest pain  • Negative: Difficulty breathing  • Negative: Surgical incision symptoms and questions  • Negative: [1] Discomfort (pain, burning or stinging) when passing urine AND [2] male  • Negative: [1] Discomfort (pain, burning or stinging) when passing urine AND [2] female  • Negative: Constipation  • Negative: New or worsening leg (calf, thigh) pain  • Negative: New or worsening leg swelling  • Negative: Dizziness is severe, or persists > 24 hours after surgery  • Negative: Pain, redness, swelling, or pus at IV Site  • Negative: Symptoms arising from use of a urinary catheter (Horne or Coude)  • Negative: Cast problems or questions  • Negative: Medication question  • Negative: [1] Widespread rash AND [2] bright red, sunburn-like  • Negative: [1] SEVERE headache AND [2] after spinal (epidural) anesthesia  • Negative: [1] Vomiting AND [2] persists > 4 hours  • Negative: [1] Vomiting AND [2] abdomen looks much more swollen than usual  • Negative: [1] Drinking very little AND [2] dehydration suspected (e.g., no urine > 12 hours, very dry mouth, very lightheaded)  • Negative: Patient sounds very sick or weak to the triager  • Negative: Sounds like a serious complication to the triager  • Negative: Fever > 100.5 F (38.1 C)  • Negative: [1] SEVERE post-op pain (e.g., excruciating, pain scale 8-10) AND [2] not controlled with pain medications  • Negative: [1] Caller has URGENT question AND [2] triager unable to answer question  • Negative: [1] Headache AND [2] after spinal (epidural) anesthesia AND [3] not severe  • Negative:  "Fever present > 3 days (72 hours)  • Negative: [1] MILD-MODERATE post-op pain (e.g., pain scale 1-7) AND [2] not controlled with pain medications  • Negative: General activity, questions about  • Negative: Resuming driving, questions about    Answer Assessment - Initial Assessment Questions  1. SYMPTOM: \"What's the main symptom you're concerned about?\" (e.g., pain, fever, vomiting)      She is worried about her diet.   2. ONSET: \"When did ________  start?\"      She is worried about her diet.   3. SURGERY: \"What surgery was performed?\"      Gastric sleeve laparoscopic   4. DATE of SURGERY: \"When was surgery performed?\"       05/20/19  5. ANESTHESIA: \" What type of anesthesia did you have?\" (e.g., general, spinal, epidural, local)      General   6. PAIN: \"Is there any pain?\" If so, ask: \"How bad is it?\"  (Scale 1-10; or mild, moderate, severe)      Not much   7. FEVER: \"Do you have a fever?\" If so, ask: \"What is your temperature, how was it measured, and when did it start?\"      No fever   8. VOMITING: \"Is there any vomiting?\" If yes, ask: \"How many times?\"      No vomiting   9. BLEEDING: \"Is there any bleeding?\" If so, ask: \"How much?\" and \"Where?\"      N/a   10. OTHER SYMPTOMS: \"Do you have any other symptoms?\" (e.g., drainage from wound, painful urination, constipation)        none    Protocols used: POST-OP SYMPTOMS AND QUESTIONS-ADULT-AH      "

## 2019-06-19 ENCOUNTER — LAB (OUTPATIENT)
Dept: LAB | Facility: HOSPITAL | Age: 44
End: 2019-06-19

## 2019-06-19 ENCOUNTER — OFFICE VISIT (OUTPATIENT)
Dept: BARIATRICS/WEIGHT MGMT | Facility: CLINIC | Age: 44
End: 2019-06-19

## 2019-06-19 VITALS
DIASTOLIC BLOOD PRESSURE: 73 MMHG | HEART RATE: 81 BPM | HEIGHT: 65 IN | TEMPERATURE: 97.4 F | WEIGHT: 289 LBS | RESPIRATION RATE: 18 BRPM | BODY MASS INDEX: 48.15 KG/M2 | SYSTOLIC BLOOD PRESSURE: 132 MMHG

## 2019-06-19 DIAGNOSIS — R60.0 LOCALIZED EDEMA: ICD-10-CM

## 2019-06-19 DIAGNOSIS — E66.01 OBESITY, CLASS III, BMI 40-49.9 (MORBID OBESITY) (HCC): Primary | ICD-10-CM

## 2019-06-19 DIAGNOSIS — R73.03 PREDIABETES: ICD-10-CM

## 2019-06-19 DIAGNOSIS — E55.9 VITAMIN D INSUFFICIENCY: ICD-10-CM

## 2019-06-19 DIAGNOSIS — Z71.3 DIETARY COUNSELING: ICD-10-CM

## 2019-06-19 DIAGNOSIS — E66.01 OBESITY, CLASS III, BMI 40-49.9 (MORBID OBESITY) (HCC): ICD-10-CM

## 2019-06-19 DIAGNOSIS — G47.33 OSA (OBSTRUCTIVE SLEEP APNEA): ICD-10-CM

## 2019-06-19 LAB
25(OH)D3 SERPL-MCNC: 37.8 NG/ML (ref 30–100)
ALBUMIN SERPL-MCNC: 4 G/DL (ref 3.5–5.2)
ALBUMIN/GLOB SERPL: 1.3 G/DL
ALP SERPL-CCNC: 75 U/L (ref 39–117)
ALT SERPL W P-5'-P-CCNC: 37 U/L (ref 1–33)
ANION GAP SERPL CALCULATED.3IONS-SCNC: 12.1 MMOL/L
AST SERPL-CCNC: 22 U/L (ref 1–32)
BASOPHILS # BLD AUTO: 0.03 10*3/MM3 (ref 0–0.2)
BASOPHILS NFR BLD AUTO: 0.4 % (ref 0–1.5)
BILIRUB SERPL-MCNC: 0.5 MG/DL (ref 0.2–1.2)
BUN BLD-MCNC: 16 MG/DL (ref 6–20)
BUN/CREAT SERPL: 20.8 (ref 7–25)
CALCIUM SPEC-SCNC: 9.7 MG/DL (ref 8.6–10.5)
CHLORIDE SERPL-SCNC: 103 MMOL/L (ref 98–107)
CO2 SERPL-SCNC: 24.9 MMOL/L (ref 22–29)
CREAT BLD-MCNC: 0.77 MG/DL (ref 0.57–1)
DEPRECATED RDW RBC AUTO: 46.5 FL (ref 37–54)
EOSINOPHIL # BLD AUTO: 0.24 10*3/MM3 (ref 0–0.4)
EOSINOPHIL NFR BLD AUTO: 3 % (ref 0.3–6.2)
ERYTHROCYTE [DISTWIDTH] IN BLOOD BY AUTOMATED COUNT: 14.6 % (ref 12.3–15.4)
FERRITIN SERPL-MCNC: 136 NG/ML (ref 13–150)
FOLATE SERPL-MCNC: >20 NG/ML (ref 4.78–24.2)
GFR SERPL CREATININE-BSD FRML MDRD: 81 ML/MIN/1.73
GFR SERPL CREATININE-BSD FRML MDRD: 99 ML/MIN/1.73
GLOBULIN UR ELPH-MCNC: 3.1 GM/DL
GLUCOSE BLD-MCNC: 87 MG/DL (ref 65–99)
HCT VFR BLD AUTO: 41.1 % (ref 34–46.6)
HGB BLD-MCNC: 12.9 G/DL (ref 12–15.9)
IMM GRANULOCYTES # BLD AUTO: 0.01 10*3/MM3 (ref 0–0.05)
IMM GRANULOCYTES NFR BLD AUTO: 0.1 % (ref 0–0.5)
IRON 24H UR-MRATE: 44 MCG/DL (ref 37–145)
LYMPHOCYTES # BLD AUTO: 1.91 10*3/MM3 (ref 0.7–3.1)
LYMPHOCYTES NFR BLD AUTO: 24 % (ref 19.6–45.3)
MCH RBC QN AUTO: 27.4 PG (ref 26.6–33)
MCHC RBC AUTO-ENTMCNC: 31.4 G/DL (ref 31.5–35.7)
MCV RBC AUTO: 87.3 FL (ref 79–97)
MONOCYTES # BLD AUTO: 0.45 10*3/MM3 (ref 0.1–0.9)
MONOCYTES NFR BLD AUTO: 5.7 % (ref 5–12)
NEUTROPHILS # BLD AUTO: 5.31 10*3/MM3 (ref 1.7–7)
NEUTROPHILS NFR BLD AUTO: 66.8 % (ref 42.7–76)
NRBC BLD AUTO-RTO: 0 /100 WBC (ref 0–0.2)
PLATELET # BLD AUTO: 251 10*3/MM3 (ref 140–450)
PMV BLD AUTO: 10 FL (ref 6–12)
POTASSIUM BLD-SCNC: 3.7 MMOL/L (ref 3.5–5.2)
PREALB SERPL-MCNC: 16.6 MG/DL (ref 20–40)
PROT SERPL-MCNC: 7.1 G/DL (ref 6–8.5)
RBC # BLD AUTO: 4.71 10*6/MM3 (ref 3.77–5.28)
SODIUM BLD-SCNC: 140 MMOL/L (ref 136–145)
WBC NRBC COR # BLD: 7.95 10*3/MM3 (ref 3.4–10.8)

## 2019-06-19 PROCEDURE — 84425 ASSAY OF VITAMIN B-1: CPT

## 2019-06-19 PROCEDURE — 82728 ASSAY OF FERRITIN: CPT

## 2019-06-19 PROCEDURE — 80053 COMPREHEN METABOLIC PANEL: CPT

## 2019-06-19 PROCEDURE — 82306 VITAMIN D 25 HYDROXY: CPT

## 2019-06-19 PROCEDURE — 85025 COMPLETE CBC W/AUTO DIFF WBC: CPT

## 2019-06-19 PROCEDURE — 83540 ASSAY OF IRON: CPT

## 2019-06-19 PROCEDURE — 82746 ASSAY OF FOLIC ACID SERUM: CPT

## 2019-06-19 PROCEDURE — 83921 ORGANIC ACID SINGLE QUANT: CPT

## 2019-06-19 PROCEDURE — 99213 OFFICE O/P EST LOW 20 MIN: CPT | Performed by: NURSE PRACTITIONER

## 2019-06-19 PROCEDURE — 36415 COLL VENOUS BLD VENIPUNCTURE: CPT

## 2019-06-19 PROCEDURE — 84134 ASSAY OF PREALBUMIN: CPT

## 2019-06-19 NOTE — PROGRESS NOTES
[facility] BARIATRIC SURGERY  [address] [facility] [address]  [facility] [address]  [phone]  [address] [id number] [phone]  Dept: [phone]  6/19/2019      Jessica Mota.  [id number]  [id number]  1975  female      Chief Complaint   Patient presents with   • Follow-up     1 month post op sleeve       BH Post-Op Bariatric Surgery:   Jessica Mota is status post Laparoscopic Sleeve/HH procedure, performed on 5/20/19     HPI:   Today's weight is 131 kg (289 lb) pounds, today's BMI is Body mass index is 48.09 kg/m²., she has a  loss of 16 pounds since the last visit and her weight loss since surgery is 42 pounds. The patient reports a decreased portion size and loss of appetite.      Jessica Mota denies dysphagia, reflux.  Patient does report intermittent nausea which is how she reports that she knows that she is hungry and it is time to eat but this resolves once she gets food.  She is progressed up to eggs and her dietary progression has tolerated everything well.  Not tried fish or other meats yet     Diet and Exercise: Diet history reviewed and discussed with the patient. Weight loss/gains to date discussed with the patient. The patient states they are eating 50 grams of protein per day. She reports eating 3 meals per day, a typical portion size of 1/2 cup, eating 1 snacks per day, drinking 3 or more 8-oz. glasses of water per day, no carbonated beverage consumption and exercising regularly- walking twice per week    Patient is not taking anything for constipation    Supplements: BA MTV with iron and calcium.     Review of Systems   Constitutional: Positive for appetite change. Negative for fatigue and unexpected weight change.   HENT: Negative.    Eyes: Negative.    Respiratory: Negative.    Cardiovascular: Negative.  Negative for leg swelling.   Gastrointestinal: Positive for constipation and nausea. Negative for abdominal  distention, abdominal pain, diarrhea and vomiting.   Genitourinary: Negative for difficulty urinating, frequency and urgency.   Musculoskeletal: Negative for back pain.   Skin: Negative.    Psychiatric/Behavioral: Negative.    All other systems reviewed and are negative.      Patient Active Problem List   Diagnosis   • SHANDA (obstructive sleep apnea)   • Vitamin D insufficiency   • Thyroid nodule   • Pulmonary embolism (CMS/[facility])   • Depression with anxiety   • Edema   • Dietary counseling   • Exercise counseling   • Labile blood pressure   • Heartburn   • Multiple joint pain   • Prediabetes   • Obesity, Class III, BMI 40-49.9 (morbid obesity) (CMS/[facility])       Past Medical History:   Diagnosis Date   • Anxiety    • Asthma     EXERCISE INDUCED ASTHMA    • Hirsutism    • History of cellulitis    • Joint pain     GENERALIZED   • PCOS (polycystic ovarian syndrome)    • PE (pulmonary thromboembolism) (CMS/[facility]) 2018   • Plantar fasciitis    • Sleep apnea     CPAP   • Thyroid nodule    • Urinary urgency        The following portions of the patient's history were reviewed and updated as appropriate: allergies, current medications, past family history, past medical history, past social history, past surgical history and problem list.    Vitals:    06/19/19 1141   BP: 132/73   Pulse: 81   Resp: 18   Temp: 97.4 °F (36.3 °C)       Physical Exam   Constitutional: She appears well-developed and well-nourished.   Neck: No thyromegaly present.   Cardiovascular: Normal rate, regular rhythm and normal heart sounds.   Pulmonary/Chest: Effort normal and breath sounds normal. No respiratory distress. She has no wheezes.   Abdominal: Soft. Bowel sounds are normal. She exhibits no distension. There is no tenderness. There is no guarding. No hernia.   Musculoskeletal: She exhibits no edema or tenderness.   Neurological: She is alert.   Skin: Skin is warm and dry. No rash noted. No erythema.   Psychiatric: She has a normal mood and affect. Her  behavior is normal.   Nursing note and vitals reviewed.      Assessment:   Post-op, the patient is doing well.     Encounter Diagnoses   Name Primary?   • Obesity, Class III, BMI 40-49.9 (morbid obesity) (CMS/[facility]) Yes   • SHANDA (obstructive sleep apnea)    • Vitamin D insufficiency    • Dietary counseling    • Localized edema    • Prediabetes        Plan:   Patient encouraged to add strength training to routine. We discussed the difference between OTC MTV's and bariatric specific vitamins. Encouraged to continue dietary progression with goal of obtaining the majority of protein intake from whole food sources by the 3 month appointment. Discussed following up with dietitian at either her 3 month or 6 month follow up. 1 month p/o labs written today, patient will be notified with results.  Patient encouraged to add 2 doses of unsweetened powdered Metamucil to her regimen daily to prevent constipation  Encouraged patient to be sure to get plenty of lean protein per day through small frequent meals all with a protein source.   Activity restrictions: none.   Recommended patient be sure to get at least 70 grams of protein per day by eating small, frequent meals all with high lean protein choices. Be sure to limit/cut back on daily carbohydrate intake. Discussed with the patient the recommended amount of water per day to intake- half of body weight in ounces. Reviewed vitamin requirements. Be sure to do routine exercise, 150 minutes per week minimum, including both cardio and strength training.     Instructions / Recommendations: dietary counseling recommended, recommended a daily protein intake of  grams, vitamin supplement(s) recommended, recommended exercising at least 150 minutes per week, behavior modifications recommended and instructed to call the office for concerns, questions, or problems.     The patient was instructed to follow up in 2 months .     . Total time spent face to face was 20 minutes and 15 minutes  was spent counseling.

## 2019-06-22 LAB
Lab: NORMAL
METHYLMALONATE SERPL-SCNC: 72 NMOL/L (ref 0–378)
VIT B1 BLD-SCNC: 151 NMOL/L (ref 66.5–200)

## 2019-06-25 ENCOUNTER — TELEPHONE (OUTPATIENT)
Dept: BARIATRICS/WEIGHT MGMT | Facility: CLINIC | Age: 44
End: 2019-06-25

## 2019-06-25 NOTE — TELEPHONE ENCOUNTER
LM for patient regarding her lab results. Instructed patient to call back with any questions or concerns.    ----- Message from MAX Graham sent at 6/25/2019 12:29 PM EDT -----  Patient needs to increase her daily protein intake.  Her ALT is still trending down but looks much better.  Was elevated from the liver retractor from surgery.  Otherwise these look good

## 2019-08-21 ENCOUNTER — OFFICE VISIT (OUTPATIENT)
Dept: BARIATRICS/WEIGHT MGMT | Facility: CLINIC | Age: 44
End: 2019-08-21

## 2019-08-21 VITALS
BODY MASS INDEX: 42.99 KG/M2 | SYSTOLIC BLOOD PRESSURE: 127 MMHG | TEMPERATURE: 97.7 F | HEIGHT: 65 IN | RESPIRATION RATE: 18 BRPM | HEART RATE: 67 BPM | WEIGHT: 258 LBS | DIASTOLIC BLOOD PRESSURE: 82 MMHG

## 2019-08-21 DIAGNOSIS — F41.8 DEPRESSION WITH ANXIETY: ICD-10-CM

## 2019-08-21 DIAGNOSIS — E66.01 OBESITY, CLASS III, BMI 40-49.9 (MORBID OBESITY) (HCC): Primary | ICD-10-CM

## 2019-08-21 DIAGNOSIS — Z71.3 DIETARY COUNSELING: ICD-10-CM

## 2019-08-21 DIAGNOSIS — G47.33 OSA (OBSTRUCTIVE SLEEP APNEA): ICD-10-CM

## 2019-08-21 DIAGNOSIS — R73.03 PREDIABETES: ICD-10-CM

## 2019-08-21 DIAGNOSIS — E55.9 VITAMIN D INSUFFICIENCY: ICD-10-CM

## 2019-08-21 PROBLEM — Z98.84 S/P LAPAROSCOPIC SLEEVE GASTRECTOMY: Status: ACTIVE | Noted: 2019-08-21

## 2019-08-21 PROCEDURE — 99213 OFFICE O/P EST LOW 20 MIN: CPT | Performed by: NURSE PRACTITIONER

## 2019-08-21 NOTE — PROGRESS NOTES
[facility] BARIATRIC SURGERY  [address] [address]  [phone]  [address] [address]  [phone]  Dept: [phone]  8/21/2019      Jessica Mota.  [id number]  [id number]  1975  female      Chief Complaint   Patient presents with   • Follow-up     3 month sleeve       BH Post-Op Bariatric Surgery:   Jessica Mota is status post Laparoscopic Sleeve/HH procedure, performed on 5/20/19     HPI:   Today's weight is 117 kg (258 lb) pounds, today's BMI is Body mass index is 42.93 kg/m²., she has a  loss of 31 pounds since the last visit and her weight loss since surgery is 74 pounds. The patient reports a decreased portion size and loss of appetite.      Jessica Mota denies GERD dysphasia or vomiting.  Does report intermittent nausea and fatigue as well as some hair loss and intermittent dizziness     Diet and Exercise: Diet history reviewed and discussed with the patient. Weight loss/gains to date discussed with the patient. The patient states they are eating 30-40 grams of protein per day. She reports eating 3 meals per day, a typical portion size of 1/2 cup, eating 1 snacks per day, drinking 4 or more 8-oz. glasses of water per day, no carbonated beverage consumption and exercising regularly- weight machines 3 days per week    Supplements: BA MTV with iron.     Review of Systems   Constitutional: Positive for appetite change and fatigue. Negative for unexpected weight change.   HENT: Negative.    Eyes: Negative.    Respiratory: Negative.    Cardiovascular: Negative.  Negative for leg swelling.   Gastrointestinal: Positive for nausea. Negative for abdominal distention, abdominal pain, constipation, diarrhea and vomiting.   Genitourinary: Negative for difficulty urinating, frequency and urgency.   Musculoskeletal: Negative for back pain.   Skin: Negative.    Neurological: Positive for dizziness.    Psychiatric/Behavioral: Negative.    All other systems reviewed and are negative.      Patient Active Problem List   Diagnosis   • SHANDA (obstructive sleep apnea)   • Vitamin D insufficiency   • Thyroid nodule   • Pulmonary embolism (CMS/[facility])   • Depression with anxiety   • Edema   • Exercise counseling   • Labile blood pressure   • Heartburn   • Multiple joint pain   • Prediabetes   • Obesity, Class III, BMI 40-49.9 (morbid obesity) (CMS/[facility])   • S/P laparoscopic sleeve gastrectomy       Past Medical History:   Diagnosis Date   • Anxiety    • Asthma     EXERCISE INDUCED ASTHMA    • Hirsutism    • History of cellulitis    • Joint pain     GENERALIZED   • PCOS (polycystic ovarian syndrome)    • PE (pulmonary thromboembolism) (CMS/[facility]) 2018   • Plantar fasciitis    • Sleep apnea     CPAP   • Thyroid nodule    • Urinary urgency        The following portions of the patient's history were reviewed and updated as appropriate: allergies, current medications, past family history, past medical history, past social history, past surgical history and problem list.    Vitals:    08/21/19 1408   BP: 127/82   Pulse: 67   Resp: 18   Temp: 97.7 °F (36.5 °C)       Physical Exam   Constitutional: She appears well-developed and well-nourished.   Neck: No thyromegaly present.   Cardiovascular: Normal rate, regular rhythm and normal heart sounds.   Pulmonary/Chest: Effort normal and breath sounds normal. No respiratory distress. She has no wheezes.   Abdominal: Soft. Bowel sounds are normal. She exhibits no distension. There is no tenderness. There is no guarding. No hernia.   Musculoskeletal: She exhibits no edema or tenderness.   Neurological: She is alert.   Skin: Skin is warm and dry. No rash noted. No erythema.   Psychiatric: She has a normal mood and affect. Her behavior is normal.   Nursing note and vitals reviewed.      Assessment:   Post-op, the patient is doing well.     Encounter Diagnoses   Name Primary?   • Obesity, Class  III, BMI 40-49.9 (morbid obesity) (CMS/[facility]) Yes   • Dietary counseling    • SHANDA (obstructive sleep apnea)    • Vitamin D insufficiency    • Depression with anxiety    • Prediabetes        Plan:   He was encouraged to keep up the great work with meal frequency and exercise.  She was encouraged to really focus on increasing her daily protein intake even if she needs to go back and out of supplements some days  Encouraged patient to be sure to get plenty of lean protein per day through small frequent meals all with a protein source.   Activity restrictions: none.   Recommended patient be sure to get at least 70 grams of protein per day by eating small, frequent meals all with high lean protein choices. Be sure to limit/cut back on daily carbohydrate intake. Discussed with the patient the recommended amount of water per day to intake- half of body weight in ounces. Reviewed vitamin requirements. Be sure to do routine exercise, 150 minutes per week minimum, including both cardio and strength training.     Instructions / Recommendations: dietary counseling recommended, recommended a daily protein intake of  grams, vitamin supplement(s) recommended, recommended exercising at least 150 minutes per week, behavior modifications recommended and instructed to call the office for concerns, questions, or problems.     The patient was instructed to follow up  3 months .    Total time spent face to face was 20 minutes and 15 minutes was spent counseling.

## 2019-09-24 ENCOUNTER — TRANSCRIBE ORDERS (OUTPATIENT)
Dept: PHYSICAL THERAPY | Facility: CLINIC | Age: 44
End: 2019-09-24

## 2019-09-24 DIAGNOSIS — S60.212A CONTUSION OF LEFT WRIST, INITIAL ENCOUNTER: Primary | ICD-10-CM

## 2019-09-25 ENCOUNTER — TREATMENT (OUTPATIENT)
Dept: PHYSICAL THERAPY | Facility: CLINIC | Age: 44
End: 2019-09-25

## 2019-09-25 DIAGNOSIS — S60.212D CONTUSION OF LEFT WRIST, SUBSEQUENT ENCOUNTER: Primary | ICD-10-CM

## 2019-09-25 DIAGNOSIS — M79.642 PAIN OF LEFT HAND: ICD-10-CM

## 2019-09-25 PROCEDURE — 97162 PT EVAL MOD COMPLEX 30 MIN: CPT | Performed by: PHYSICAL THERAPIST

## 2019-09-25 PROCEDURE — 97110 THERAPEUTIC EXERCISES: CPT | Performed by: PHYSICAL THERAPIST

## 2019-09-25 PROCEDURE — 97140 MANUAL THERAPY 1/> REGIONS: CPT | Performed by: PHYSICAL THERAPIST

## 2019-09-25 NOTE — PROGRESS NOTES
Physical Therapy Initial Evaluation and Plan of Care    Patient: Jessica Mota   : 1975  Diagnosis/ICD-10 Code:  Pain of left hand [M79.642]  Referring practitioner: NANCY Denton  Date of Initial Visit: 2019  Today's Date: 2019  Patient seen for 1 sessions           Subjective Questionnaire: Quick DASH 48/55      Subjective Evaluation    History of Present Illness  Date of onset: 2019    Quality of life: excellent    Pain  Current pain rating: 10  At best pain ratin  At worst pain rating: 10  Location: Left thumb (mostly) and into the left medial wrist  Quality: tearing sensation when attempting to move the left thumb  Numbness with movement and also, at times, upon waking.    Relieving factors: ice, rest and medications (mm relaxers. )  Exacerbated by: any movement of the left thumb.  Progression: no change    Social Support  Lives in: apartment  Lives with: alone    Hand dominance: right    Diagnostic Tests  X-ray: normal    Treatments  Previous treatment: medication (brace)  Patient Goals  Patient goals for therapy: decreased edema, decreased pain, increased motion, increased strength, return to work and independence with ADLs/IADLs (rides motorcycle)         Pnt reports the injury 0ccurred on 19 when she was carrying a 50#roll and when trying to place the steel bar into the roll, the bar had a direct impact to the left medial wrist.  She reports immediate pain and fel a pop.  To the urgent care-- x ray/brace.  She has since been to [facility]; started on steroid pack and is alternating b/t heat and ice.      Objective       Observations     Additional Observation Details  Patient wearing a thumb spica splint.  Upon removal of the brace, there is mild swelling noted on the proximal dorsal aspect of the left wrist.  Mar would hold the hand in a guarded, protective posture    Tenderness     Left Wrist/Hand   Tenderness in the carpometacarpal joint. No tenderness in the  first dorsal compartment and triangular fibrocartilage complex .     Active Range of Motion     Left Wrist   Wrist flexion: 38 degrees   Wrist extension: 38 degrees   Radial deviation: 14 degrees   Ulnar deviation: 8 degrees     Left Thumb   Opposition: Scant motion noted of CMC, MP and opposition motions. Pain along lateral aspect of the thumb    Additional Active Range of Motion Details  Extension and UD with the worse pain.  Burning sensation along radial aspect of the wrist. with UD     Strength/Myotome Testing     Left Wrist/Hand   Wrist extension: 3  Wrist flexion: 3-  Radial deviation: 3-  Ulnar deviation: 3-    Additional Strength Details   and pinch testing not completed today due to the lack of motion.     Tests     Left Wrist/Hand   Positive distal radial-ulnar joint stress and UCL valgus stress.     Additional Tests Details  Unable to complete some of the testing due to pnt hesitation and guarding of the wrist/hand.     Swelling     Left Wrist/Hand   Circumference MCP: 20.5 cm  Circumference wrist: 18.3 cm    Additional Swelling Details  5 cm superior to ulnar styloid process  L = 22.0 cm  R = 23.1 cm     Right Wrist/Hand   Circumference MCP: 20.1 cm  Circumference wrist: 17.7 cm         Assessment & Plan     Assessment  Impairments: abnormal or restricted ROM, activity intolerance, impaired physical strength, lacks appropriate home exercise program and pain with function  Assessment details: Ms. Mota has been referred to PT due to a recent L wrist injury on 19 while at work.  The initial PT evaluation was completed today with evidence consistent a contusion injury to the left wrist/hand.  Impairments include reduced motion, weakness, swelling and significant functional limitations due to the above.    Prognosis: good  Functional Limitations: carrying objects, lifting, pulling, pushing and uncomfortable because of pain  Goals  Plan Goals: ST visits      1) pain levels 0-4      2)  pnt to  demonstrate 75% or greater AROM of the left wrist and thumb      3)  pnt to complete strengthening ex with wts/bands w/o pain    LTG:  DC        1)  painfree L wrist/hand motion to norm values      2)  Improve Quick Dash score =/> 15 points      3)  MMT =/> 4/5 L lower arm      4)  Negative special tests      Plan  Therapy options: will be seen for skilled physical therapy services  Planned modality interventions: cryotherapy, high voltage pulsed current (pain management), thermotherapy (hydrocollator packs), thermotherapy (paraffin bath) and ultrasound  Planned therapy interventions: manual therapy, joint mobilization, home exercise program, functional ROM exercises, flexibility, fine motor coordination training, therapeutic activities, stretching, strengthening, soft tissue mobilization and neuromuscular re-education  Duration in visits: 18  Treatment plan discussed with: patient  Plan details: Increase ex as tolerated for motion, stretching and strengthening.         Timed:         Manual Therapy:    10     mins  13392;     Therapeutic Exercise:    17     mins  07066;     Neuromuscular Analia:        mins  09447;    Therapeutic Activity:          mins  49350;     Gait Training:           mins  52381;     Ultrasound:          mins  96627;    Ionto                                   mins   09103  Self Care                            mins   05367  Canalith Repos         mins 03377      Un-Timed:  Electrical Stimulation:         mins  77578 (MC );  Dry Needling          mins self-pay  Traction          mins 92188  Low Eval          Mins  55084  Mod Eval     26     Mins  32612  High Eval                            Mins  58676  Re-Eval                               mins  46445        Timed Treatment:   27   mins   Total Treatment:     55   mins    PT SIGNATURE: Trudy Marte, BEATRICE   DATE TREATMENT INITIATED: 9/25/2019    Initial Certification  Certification Period: 12/24/2019  I certify that the therapy services are  furnished while this patient is under my care.  The services outlined above are required by this patient, and will be reviewed every 90 days.     PHYSICIAN: Stevan Diallo PA      DATE:     Please sign and return via fax to  .. Thank you, [facility] Physical Therapy.

## 2019-09-26 ENCOUNTER — TREATMENT (OUTPATIENT)
Dept: PHYSICAL THERAPY | Facility: CLINIC | Age: 44
End: 2019-09-26

## 2019-09-26 DIAGNOSIS — M79.642 PAIN OF LEFT HAND: Primary | ICD-10-CM

## 2019-09-26 DIAGNOSIS — S60.212D CONTUSION OF LEFT WRIST, SUBSEQUENT ENCOUNTER: ICD-10-CM

## 2019-09-26 DIAGNOSIS — A04.8 H. PYLORI INFECTION: Primary | ICD-10-CM

## 2019-09-26 PROCEDURE — 97140 MANUAL THERAPY 1/> REGIONS: CPT | Performed by: PHYSICAL THERAPIST

## 2019-09-26 PROCEDURE — 97110 THERAPEUTIC EXERCISES: CPT | Performed by: PHYSICAL THERAPIST

## 2019-09-26 PROCEDURE — 97014 ELECTRIC STIMULATION THERAPY: CPT | Performed by: PHYSICAL THERAPIST

## 2019-09-26 NOTE — PROGRESS NOTES
Physical Therapy Daily Progress Note      Visit # 2      Subjective   Feeling some improvement from status at yesterday's eval, feeling more improvement with finger movement.      Objective   See Exercise, Manual, and Modality Logs for complete treatment.       Assessment & Plan     Assessment  Assessment details: Remains tender to palpation at base of L thumb/distal end of radius, inc pain with radial deviation.          Progress per Plan of Care and Progress strengthening /stabilization /functional activity           Timed:         Manual Therapy:    10     mins  45873;     Therapeutic Exercise:    28     mins  24197;     Neuromuscular Analia:        mins  76233;    Therapeutic Activity:          mins  61221;     Gait Training:           mins  71783;     Ultrasound:          mins  76074;    Ionto                                   mins   72077  Self Care                            mins   40163    Un-Timed:  Electrical Stimulation:    15     mins  72095 ( );  Traction          mins 11495    Timed Treatment:   38   mins   Total Treatment:     53   mins    Satish Obrien PTA  [place] License #[id number]  Physical Therapist Assistant

## 2019-10-02 ENCOUNTER — TREATMENT (OUTPATIENT)
Dept: PHYSICAL THERAPY | Facility: CLINIC | Age: 44
End: 2019-10-02

## 2019-10-02 ENCOUNTER — TRANSCRIBE ORDERS (OUTPATIENT)
Dept: PHYSICAL THERAPY | Facility: CLINIC | Age: 44
End: 2019-10-02

## 2019-10-02 DIAGNOSIS — S60.212D CONTUSION OF LEFT WRIST, SUBSEQUENT ENCOUNTER: ICD-10-CM

## 2019-10-02 DIAGNOSIS — M79.642 PAIN OF LEFT HAND: Primary | ICD-10-CM

## 2019-10-02 PROCEDURE — 97014 ELECTRIC STIMULATION THERAPY: CPT | Performed by: PHYSICAL THERAPIST

## 2019-10-02 PROCEDURE — 97110 THERAPEUTIC EXERCISES: CPT | Performed by: PHYSICAL THERAPIST

## 2019-10-02 PROCEDURE — 97140 MANUAL THERAPY 1/> REGIONS: CPT | Performed by: PHYSICAL THERAPIST

## 2019-10-02 NOTE — PROGRESS NOTES
Physical Therapy Daily Progress Note    VISIT#: 3    Subjective   Jessica Mota reports: that the pain is unchanged   Some relief with the tape.   Pain radiates from the thumb into the forearm, dianna with motion.       Objective   Wrist flex and ext pain with active motion,  Min pain with passive motion - until end range.  Thumb motions all directions dianna with use of  Abductor pollicis and extensor pollicis longus.  Minimal pain with passive motion of the same thumb motions.  Palpation with hypersensitivity at distal radius, snuff box.      Use of coverall tape and K tape for added stability.      See Exercise, Manual, and Modality Logs for complete treatment.     Patient Education:    Assessment/Plan  Ms. Mota able to complete the ex however reporting pain with the activity.      Progress per Plan of Care            Timed:         Manual Therapy:    14     mins  22662;     Therapeutic Exercise:    19     mins  44672;     Neuromuscular Analia:        mins  90634;    Therapeutic Activity:          mins  30067;     Gait Training:           mins  74470;     Ultrasound:          mins  48725;    Ionto                                   mins   95994  Self Care                            mins   57031  Canalith Repos                   mins  16894    Un-Timed:  Electrical Stimulation:    15     mins  19438 ( );  Dry Needling          mins self-pay  Traction          mins 00767  Low Eval          Mins  26312  Mod Eval          Mins  97082  High Eval                            Mins  95659  Re-Eval                               mins  00142    Timed Treatment:   33   mins   Total Treatment:     51   mins    Trudy Marte PT    Physical Therapist

## 2019-10-08 ENCOUNTER — TREATMENT (OUTPATIENT)
Dept: PHYSICAL THERAPY | Facility: CLINIC | Age: 44
End: 2019-10-08

## 2019-10-08 DIAGNOSIS — S60.212D CONTUSION OF LEFT WRIST, SUBSEQUENT ENCOUNTER: ICD-10-CM

## 2019-10-08 DIAGNOSIS — M79.642 PAIN OF LEFT HAND: Primary | ICD-10-CM

## 2019-10-08 PROCEDURE — 97110 THERAPEUTIC EXERCISES: CPT | Performed by: PHYSICAL THERAPIST

## 2019-10-08 PROCEDURE — 97140 MANUAL THERAPY 1/> REGIONS: CPT | Performed by: PHYSICAL THERAPIST

## 2019-10-08 PROCEDURE — 97530 THERAPEUTIC ACTIVITIES: CPT | Performed by: PHYSICAL THERAPIST

## 2019-11-06 ENCOUNTER — TELEPHONE (OUTPATIENT)
Dept: BARIATRICS/WEIGHT MGMT | Facility: CLINIC | Age: 44
End: 2019-11-06

## 2019-11-06 NOTE — TELEPHONE ENCOUNTER
Spoke to patient and reminder her to have repeat h pylori breath test done. Instructed patient on where to go for this, patient gave verbal understanding.

## 2019-11-20 ENCOUNTER — LAB (OUTPATIENT)
Dept: LAB | Facility: HOSPITAL | Age: 44
End: 2019-11-20

## 2019-11-20 ENCOUNTER — OFFICE VISIT (OUTPATIENT)
Dept: BARIATRICS/WEIGHT MGMT | Facility: CLINIC | Age: 44
End: 2019-11-20

## 2019-11-20 VITALS
TEMPERATURE: 96.9 F | RESPIRATION RATE: 18 BRPM | DIASTOLIC BLOOD PRESSURE: 91 MMHG | SYSTOLIC BLOOD PRESSURE: 134 MMHG | BODY MASS INDEX: 41.32 KG/M2 | WEIGHT: 248 LBS | HEART RATE: 65 BPM | HEIGHT: 65 IN

## 2019-11-20 DIAGNOSIS — Z71.82 EXERCISE COUNSELING: ICD-10-CM

## 2019-11-20 DIAGNOSIS — Z98.84 S/P LAPAROSCOPIC SLEEVE GASTRECTOMY: ICD-10-CM

## 2019-11-20 DIAGNOSIS — E66.01 OBESITY, CLASS III, BMI 40-49.9 (MORBID OBESITY) (HCC): ICD-10-CM

## 2019-11-20 DIAGNOSIS — G47.33 OSA (OBSTRUCTIVE SLEEP APNEA): ICD-10-CM

## 2019-11-20 DIAGNOSIS — M25.50 MULTIPLE JOINT PAIN: ICD-10-CM

## 2019-11-20 DIAGNOSIS — E66.01 OBESITY, CLASS III, BMI 40-49.9 (MORBID OBESITY) (HCC): Primary | ICD-10-CM

## 2019-11-20 LAB
ALBUMIN SERPL-MCNC: 4.3 G/DL (ref 3.5–5.2)
ALBUMIN/GLOB SERPL: 1.4 G/DL
ALP SERPL-CCNC: 87 U/L (ref 39–117)
ALT SERPL W P-5'-P-CCNC: 19 U/L (ref 1–33)
ANION GAP SERPL CALCULATED.3IONS-SCNC: 13.3 MMOL/L (ref 5–15)
AST SERPL-CCNC: 15 U/L (ref 1–32)
BASOPHILS # BLD AUTO: 0.02 10*3/MM3 (ref 0–0.2)
BASOPHILS NFR BLD AUTO: 0.2 % (ref 0–1.5)
BILIRUB SERPL-MCNC: 0.4 MG/DL (ref 0.2–1.2)
BUN BLD-MCNC: 11 MG/DL (ref 6–20)
BUN/CREAT SERPL: 13.3 (ref 7–25)
CALCIUM SPEC-SCNC: 9.2 MG/DL (ref 8.6–10.5)
CHLORIDE SERPL-SCNC: 108 MMOL/L (ref 98–107)
CHOLEST SERPL-MCNC: 218 MG/DL (ref 0–200)
CO2 SERPL-SCNC: 25.7 MMOL/L (ref 22–29)
CREAT BLD-MCNC: 0.83 MG/DL (ref 0.57–1)
DEPRECATED RDW RBC AUTO: 43.8 FL (ref 37–54)
EOSINOPHIL # BLD AUTO: 0.24 10*3/MM3 (ref 0–0.4)
EOSINOPHIL NFR BLD AUTO: 2.6 % (ref 0.3–6.2)
ERYTHROCYTE [DISTWIDTH] IN BLOOD BY AUTOMATED COUNT: 13.3 % (ref 12.3–15.4)
GFR SERPL CREATININE-BSD FRML MDRD: 75 ML/MIN/1.73
GFR SERPL CREATININE-BSD FRML MDRD: 91 ML/MIN/1.73
GLOBULIN UR ELPH-MCNC: 3 GM/DL
GLUCOSE BLD-MCNC: 88 MG/DL (ref 65–99)
HBA1C MFR BLD: 5.1 % (ref 4.8–5.6)
HCT VFR BLD AUTO: 41.1 % (ref 34–46.6)
HDLC SERPL-MCNC: 64 MG/DL (ref 40–60)
HGB BLD-MCNC: 13.8 G/DL (ref 12–15.9)
IMM GRANULOCYTES # BLD AUTO: 0.02 10*3/MM3 (ref 0–0.05)
IMM GRANULOCYTES NFR BLD AUTO: 0.2 % (ref 0–0.5)
LDLC SERPL CALC-MCNC: 141 MG/DL (ref 0–100)
LDLC/HDLC SERPL: 2.2 {RATIO}
LYMPHOCYTES # BLD AUTO: 3.03 10*3/MM3 (ref 0.7–3.1)
LYMPHOCYTES NFR BLD AUTO: 32.9 % (ref 19.6–45.3)
MCH RBC QN AUTO: 30.1 PG (ref 26.6–33)
MCHC RBC AUTO-ENTMCNC: 33.6 G/DL (ref 31.5–35.7)
MCV RBC AUTO: 89.7 FL (ref 79–97)
MONOCYTES # BLD AUTO: 0.42 10*3/MM3 (ref 0.1–0.9)
MONOCYTES NFR BLD AUTO: 4.6 % (ref 5–12)
NEUTROPHILS # BLD AUTO: 5.48 10*3/MM3 (ref 1.7–7)
NEUTROPHILS NFR BLD AUTO: 59.5 % (ref 42.7–76)
NRBC BLD AUTO-RTO: 0 /100 WBC (ref 0–0.2)
PLATELET # BLD AUTO: 210 10*3/MM3 (ref 140–450)
PMV BLD AUTO: 10.2 FL (ref 6–12)
POTASSIUM BLD-SCNC: 3.9 MMOL/L (ref 3.5–5.2)
PROT SERPL-MCNC: 7.3 G/DL (ref 6–8.5)
RBC # BLD AUTO: 4.58 10*6/MM3 (ref 3.77–5.28)
SODIUM BLD-SCNC: 147 MMOL/L (ref 136–145)
TRIGL SERPL-MCNC: 65 MG/DL (ref 0–150)
TSH SERPL DL<=0.05 MIU/L-ACNC: 1.59 UIU/ML (ref 0.27–4.2)
VLDLC SERPL-MCNC: 13 MG/DL (ref 5–40)
WBC NRBC COR # BLD: 9.21 10*3/MM3 (ref 3.4–10.8)

## 2019-11-20 PROCEDURE — 80061 LIPID PANEL: CPT

## 2019-11-20 PROCEDURE — 85025 COMPLETE CBC W/AUTO DIFF WBC: CPT

## 2019-11-20 PROCEDURE — 83036 HEMOGLOBIN GLYCOSYLATED A1C: CPT

## 2019-11-20 PROCEDURE — 99213 OFFICE O/P EST LOW 20 MIN: CPT | Performed by: NURSE PRACTITIONER

## 2019-11-20 PROCEDURE — 36415 COLL VENOUS BLD VENIPUNCTURE: CPT

## 2019-11-20 PROCEDURE — 80053 COMPREHEN METABOLIC PANEL: CPT

## 2019-11-20 PROCEDURE — 83013 H PYLORI (C-13) BREATH: CPT | Performed by: NURSE PRACTITIONER

## 2019-11-20 PROCEDURE — 84443 ASSAY THYROID STIM HORMONE: CPT

## 2019-11-20 NOTE — PROGRESS NOTES
[facility]ATR[facility] BARIATRIC SURGERY  [address] [address]  [phone]  [address] [address]  [phone]  Dept: [phone]  11/20/2019      Jessica Mota.  [id number]  [id number]  1975  female      Chief Complaint   Patient presents with   • Follow-up     6 month sleeve       BH Post-Op Bariatric Surgery:   Jessica Mota is status post Laparoscopic Sleeve/HH procedure, performed on 5/20/19     HPI:   Today's weight is 112 kg (248 lb) pounds, today's BMI is Body mass index is 41.27 kg/m²., she has a  loss of 10 pounds since the last visit and her weight loss since surgery is 84 pounds. The patient reports a decreased portion size and loss of appetite.      Jessica Mota denies reflux regurgitation nausea vomiting or difficulty swallowing and reports fatigue and mild hair loss     Diet and Exercise: Diet history reviewed and discussed with the patient. Weight loss/gains to date discussed with the patient. The patient states they are eating 30-60 grams of protein per day. She reports eating 3 meals per day, a typical portion size of 1/2 cup, eating 1 snacks per day, drinking 3 or more 8-oz. glasses of water per day, no carbonated beverage consumption and exercising regularly.     Supplements: BA MTV with iron.     Review of Systems   Constitutional: Positive for appetite change and fatigue. Negative for unexpected weight change.   HENT: Negative.    Eyes: Negative.    Respiratory: Negative.    Cardiovascular: Negative.  Negative for leg swelling.   Gastrointestinal: Negative for abdominal distention, abdominal pain, constipation, diarrhea, nausea and vomiting.   Genitourinary: Negative for difficulty urinating, frequency and urgency.   Musculoskeletal: Negative for back pain.   Skin: Negative.    Psychiatric/Behavioral: Negative.    All other systems reviewed and are negative.      Patient Active Problem List    Diagnosis   • SHANDA (obstructive sleep apnea)   • Vitamin D insufficiency   • Thyroid nodule   • Pulmonary embolism (CMS/[facility])   • Depression with anxiety   • Edema   • Exercise counseling   • Labile blood pressure   • Heartburn   • Multiple joint pain   • Prediabetes   • Obesity, Class III, BMI 40-49.9 (morbid obesity) (CMS/[facility])   • S/P laparoscopic sleeve gastrectomy       Past Medical History:   Diagnosis Date   • Anxiety    • Asthma     EXERCISE INDUCED ASTHMA    • Hirsutism    • History of cellulitis    • Joint pain     GENERALIZED   • PCOS (polycystic ovarian syndrome)    • PE (pulmonary thromboembolism) (CMS/[facility]) 2018   • Plantar fasciitis    • Sleep apnea     CPAP   • Thyroid nodule    • Urinary urgency        The following portions of the patient's history were reviewed and updated as appropriate: allergies, current medications, past family history, past medical history, past social history, past surgical history and problem list.    Vitals:    11/20/19 1353   BP: 134/91   Pulse: 65   Resp: 18   Temp: 96.9 °F (36.1 °C)       Physical Exam   Constitutional: She appears well-developed and well-nourished.   Neck: No thyromegaly present.   Cardiovascular: Normal rate, regular rhythm and normal heart sounds.   Pulmonary/Chest: Effort normal and breath sounds normal. No respiratory distress. She has no wheezes.   Abdominal: Soft. Bowel sounds are normal. She exhibits no distension. There is no tenderness. There is no guarding. No hernia.   Musculoskeletal: She exhibits no edema or tenderness.   Neurological: She is alert.   Skin: Skin is warm and dry. No rash noted. No erythema.   Psychiatric: She has a normal mood and affect. Her behavior is normal.   Nursing note and vitals reviewed.      Assessment:   Post-op, the patient is doing well.     Encounter Diagnoses   Name Primary?   • Obesity, Class III, BMI 40-49.9 (morbid obesity) (CMS/[facility]) Yes   • SHANDA (obstructive sleep apnea)    • Multiple joint pain    •  Exercise counseling    • S/P laparoscopic sleeve gastrectomy        Plan:   Encouraged patient to be sure to get plenty of lean protein per day through small frequent meals all with a protein source.   Activity restrictions: none.   Recommended patient be sure to get at least 70 grams of protein per day by eating small, frequent meals all with high lean protein choices. Be sure to limit/cut back on daily carbohydrate intake. Discussed with the patient the recommended amount of water per day to intake- half of body weight in ounces. Reviewed vitamin requirements. Be sure to do routine exercise, 150 minutes per week minimum, including both cardio and strength training.     Instructions / Recommendations: dietary counseling recommended, recommended a daily protein intake of  grams, vitamin supplement(s) recommended, recommended exercising at least 150 minutes per week, behavior modifications recommended and instructed to call the office for concerns, questions, or problems.     The patient was instructed to follow up in 6 months .      Total time spent face to face was 20 minutes and 15 minutes was spent counseling.

## 2019-11-21 LAB — UREA BREATH TEST QL: POSITIVE

## 2019-11-22 ENCOUNTER — HOSPITAL ENCOUNTER (EMERGENCY)
Facility: HOSPITAL | Age: 44
Discharge: LEFT BEFORE DISCHARGE | End: 2019-11-22

## 2019-11-26 ENCOUNTER — TELEPHONE (OUTPATIENT)
Dept: BARIATRICS/WEIGHT MGMT | Facility: CLINIC | Age: 44
End: 2019-11-26

## 2019-11-26 NOTE — TELEPHONE ENCOUNTER
Spoke to patient and informed her of lab results. Instructed patient to follow up with PCP about elevated levels. Patient verified understanding. Copy of labs sent to PCP.    ----- Message from MAX Graham sent at 11/25/2019  4:37 PM EST -----  Sodium and chloride were a little bit off.  Please have patient follow-up with primary care

## 2019-11-27 ENCOUNTER — TELEPHONE (OUTPATIENT)
Dept: BARIATRICS/WEIGHT MGMT | Facility: CLINIC | Age: 44
End: 2019-11-27

## 2019-11-27 DIAGNOSIS — A04.8 H. PYLORI INFECTION: Primary | ICD-10-CM

## 2019-11-27 NOTE — TELEPHONE ENCOUNTER
Spoke to patient and informed her that H pylori came back positive again and treating with pylera. explained to patient on how to take medication. Patient verified understanding. Instructed patient to call office with any questions or concerns. Repeat breath test was ordered for patient to complete in 6 weeks.    ----- Message from MAX Victor sent at 11/26/2019  1:25 PM EST -----  Sent pylera

## 2020-01-02 ENCOUNTER — OFFICE VISIT (OUTPATIENT)
Dept: BARIATRICS/WEIGHT MGMT | Facility: CLINIC | Age: 45
End: 2020-01-02

## 2020-01-02 VITALS
BODY MASS INDEX: 40.98 KG/M2 | TEMPERATURE: 96.9 F | SYSTOLIC BLOOD PRESSURE: 143 MMHG | RESPIRATION RATE: 18 BRPM | HEIGHT: 65 IN | WEIGHT: 246 LBS | DIASTOLIC BLOOD PRESSURE: 87 MMHG | HEART RATE: 68 BPM

## 2020-01-02 DIAGNOSIS — E66.01 OBESITY, CLASS III, BMI 40-49.9 (MORBID OBESITY) (HCC): Primary | ICD-10-CM

## 2020-01-02 DIAGNOSIS — Z98.84 S/P LAPAROSCOPIC SLEEVE GASTRECTOMY: ICD-10-CM

## 2020-01-02 DIAGNOSIS — Z71.3 DIETARY COUNSELING: ICD-10-CM

## 2020-01-02 PROBLEM — R12 HEARTBURN: Status: RESOLVED | Noted: 2018-12-11 | Resolved: 2020-01-02

## 2020-01-02 PROCEDURE — 99213 OFFICE O/P EST LOW 20 MIN: CPT | Performed by: NURSE PRACTITIONER

## 2020-01-02 NOTE — PROGRESS NOTES
Jessica MARIA LUZ Mota.  [id number]  [id number]  1975  female    Bariatric Nutrition Assessment Follow-Up    Procedure Performed:  Sleeve  Date of Surgery: 5/20/19    Surgery: 332  6 month post-op 248  7 month post-op: 246    Present Diet:  Moderate protein, low carb     Stated Problem Areas/Concerns:  Patient concerned with slowed weight loss    Assessment/Recommendations:  Food recall: B: triple zero yogurt; L: 4 oz beef stew with tbsp shredded cheese; D: 4 oz beef stew with shredded cheese; S: apple pie from [employer]; sometimes snacks on handful of cheerios. Takes in about 40-48oz fluid - water or Gatorade zero. She does strength training at the gym and when it's nice outside she walks 4 miles to get there and back. She is experiencing moderate constipation. She drinks about 2 wine coolers a week and has about two snacks a week that aren't in line with her plan. Body composition: Fat% 46.2; fat mass 113.5lb; .0; TBW 96.5    Goals/Plan:  Discussed the normalcy of weight loss plateaus, eating in line with the plan the majority of the time but not to have negative self talk when eating something not in line with the plan occasionally. Increase fluid and veg/fruit intake to help with constipation. Work all major muscle groups at least twice a week when strength training. Do cardio for at least 30 min at least 3-4 times per week. Patient is starting kickboxing today. We will monitor body composition to determine if body fat vs muscle mass and weight.    Follow-Up:  Patient is to follow up in 2 months.      Electronically signed by:  Ariadna Rogel RD  08/29/19 11:51 AM

## 2020-01-07 ENCOUNTER — TELEPHONE (OUTPATIENT)
Dept: NUTRITION | Facility: HOSPITAL | Age: 45
End: 2020-01-07

## 2020-01-07 NOTE — PROGRESS NOTES
Patient called asking about VitaFusion Fiber Well Fit Supplements. This product has polydextrose and B vitamins. Insured her that she was already getting the B vitamins from her bariatric vitamin but since it is water soluble, the excess will be excreted in urine. The fiber supplement is ok. If not seeing benefit from constipation switch over to metamucil. Also be aware that the VitaFusion has some sugar alcohol in it which may cause GI issues. Start adding fiber gradually and increase slowly.

## 2020-01-08 ENCOUNTER — TELEPHONE (OUTPATIENT)
Dept: BARIATRICS/WEIGHT MGMT | Facility: CLINIC | Age: 45
End: 2020-01-08

## 2020-01-08 NOTE — TELEPHONE ENCOUNTER
Spoke to patient to have repeat H pylori breath test done. Patient stated that insurance never covered PYLERA Rx.    According to covermymeds, PA was sent to [place] 11/27 but was never given a approval or denial. I will be contacting [employer] as far as what we need to do for this unanswered PA and follow up with patient once we have an answer.

## 2020-01-23 ENCOUNTER — TELEPHONE (OUTPATIENT)
Dept: BARIATRICS/WEIGHT MGMT | Facility: CLINIC | Age: 45
End: 2020-01-23

## 2020-01-23 RX ORDER — RANITIDINE 150 MG/1
300 CAPSULE ORAL EVERY EVENING
Qty: 20 CAPSULE | Refills: 0 | Status: SHIPPED | OUTPATIENT
Start: 2020-01-23 | End: 2020-02-02

## 2020-01-23 RX ORDER — TETRACYCLINE HYDROCHLORIDE 500 MG/1
500 CAPSULE ORAL 4 TIMES DAILY
Qty: 40 CAPSULE | Refills: 0 | Status: SHIPPED | OUTPATIENT
Start: 2020-01-23 | End: 2020-02-02

## 2020-01-23 RX ORDER — METRONIDAZOLE 250 MG/1
250 TABLET ORAL 4 TIMES DAILY
Qty: 40 TABLET | Refills: 0 | Status: SHIPPED | OUTPATIENT
Start: 2020-01-23 | End: 2020-02-02

## 2020-01-23 RX ORDER — BISMUTH SUBSALICYLATE 262 MG/1
524 TABLET, CHEWABLE ORAL
Qty: 80 TABLET | Refills: 0 | Status: SHIPPED | OUTPATIENT
Start: 2020-01-23 | End: 2020-02-02

## 2020-01-23 NOTE — TELEPHONE ENCOUNTER
Called patient to discuss medication we sent in for repeat postivie h pylori. Patient stated her PCP was able to get pylera approved and called it in for her. Patient has been taking medication since Monday. I advised patient that we will do repeat breath test for her in 6 weeks and I will call and remind her about it.

## 2020-01-30 RX ORDER — URSODIOL 300 MG/1
CAPSULE ORAL
Qty: 60 CAPSULE | Refills: 1 | OUTPATIENT
Start: 2020-01-30

## 2020-02-28 ENCOUNTER — OFFICE VISIT (OUTPATIENT)
Dept: BARIATRICS/WEIGHT MGMT | Facility: CLINIC | Age: 45
End: 2020-02-28

## 2020-02-28 VITALS
SYSTOLIC BLOOD PRESSURE: 132 MMHG | BODY MASS INDEX: 39.82 KG/M2 | DIASTOLIC BLOOD PRESSURE: 88 MMHG | WEIGHT: 239 LBS | HEIGHT: 65 IN | RESPIRATION RATE: 18 BRPM | TEMPERATURE: 96.2 F | HEART RATE: 84 BPM

## 2020-02-28 DIAGNOSIS — R73.03 PREDIABETES: ICD-10-CM

## 2020-02-28 DIAGNOSIS — F41.8 DEPRESSION WITH ANXIETY: ICD-10-CM

## 2020-02-28 DIAGNOSIS — G47.33 OSA (OBSTRUCTIVE SLEEP APNEA): ICD-10-CM

## 2020-02-28 DIAGNOSIS — E66.9 OBESITY, CLASS II, BMI 35-39.9: Primary | ICD-10-CM

## 2020-02-28 DIAGNOSIS — M25.50 MULTIPLE JOINT PAIN: ICD-10-CM

## 2020-02-28 DIAGNOSIS — Z98.84 S/P LAPAROSCOPIC SLEEVE GASTRECTOMY: ICD-10-CM

## 2020-02-28 PROBLEM — E66.812 OBESITY, CLASS II, BMI 35-39.9: Status: ACTIVE | Noted: 2019-05-06

## 2020-02-28 PROCEDURE — 99213 OFFICE O/P EST LOW 20 MIN: CPT | Performed by: NURSE PRACTITIONER

## 2020-02-28 NOTE — PROGRESS NOTES
[facility] BARIATRIC SURGERY  [address] [address]  [phone]  [address] [address]  [phone]  Dept: [phone]  2/28/2020      Jessica Mota.  [id number]  [id number]  1975  female      Chief Complaint   Patient presents with   • Follow-up     9 month sleeve       BH Post-Op Bariatric Surgery:   Jessica Mota is status post Laparoscopic Sleeve/HH procedure, performed on 5/20/19     HPI:   Today's weight is 108 kg (239 lb) pounds, today's BMI is Body mass index is 39.77 kg/m²., she has a  loss of 7 pounds since the last visit and her weight loss since surgery is 99 pounds. The patient reports a decreased portion size and loss of appetite.      Jessica Mota denies nausea vomiting reflux regurgitation or dysphasia and reports constipation, fatigue, dizziness     Diet and Exercise: Diet history reviewed and discussed with the patient. Weight loss/gains to date discussed with the patient. The patient states they are eating 80 grams of protein per day. She reports eating 3 meals per day, a typical portion size of 1/2 cup, eating 2 snacks per day, drinking 4 or more 8-oz. glasses of water per day, no carbonated beverage consumption and exercising regularly-boxing or the gym at least 3 days/week    Supplements: BA MTV with iron.     Review of Systems   Constitutional: Positive for appetite change. Negative for fatigue and unexpected weight change.   HENT: Negative.    Eyes: Negative.    Respiratory: Negative.    Cardiovascular: Negative.  Negative for leg swelling.   Gastrointestinal: Positive for abdominal pain and constipation. Negative for abdominal distention, diarrhea, nausea and vomiting.   Genitourinary: Negative for difficulty urinating, frequency and urgency.   Musculoskeletal: Negative for back pain.   Skin: Negative.    Psychiatric/Behavioral: Negative.    All other systems reviewed and are  negative.      Patient Active Problem List   Diagnosis   • SHANDA (obstructive sleep apnea)   • Vitamin D insufficiency   • Thyroid nodule   • Pulmonary embolism (CMS/HCC)   • Depression with anxiety   • Edema   • Dietary counseling   • Labile blood pressure   • Multiple joint pain   • Prediabetes   • Obesity, Class II, BMI 35-39.9   • S/P laparoscopic sleeve gastrectomy       Past Medical History:   Diagnosis Date   • Anxiety    • Asthma     EXERCISE INDUCED ASTHMA    • Hirsutism    • History of cellulitis    • Joint pain     GENERALIZED   • PCOS (polycystic ovarian syndrome)    • PE (pulmonary thromboembolism) (CMS/HCC) 2018   • Plantar fasciitis    • Sleep apnea     CPAP   • Thyroid nodule    • Urinary urgency        The following portions of the patient's history were reviewed and updated as appropriate: allergies, current medications, past family history, past medical history, past social history, past surgical history and problem list.    Vitals:    02/28/20 1434   BP: 132/88   Pulse: 84   Resp: 18   Temp: 96.2 °F (35.7 °C)   Body Comp: Fat mass 111.5lb, .01 lb    Physical Exam   Constitutional: She appears well-developed and well-nourished.   Neck: No thyromegaly present.   Cardiovascular: Normal rate, regular rhythm and normal heart sounds.   Pulmonary/Chest: Effort normal and breath sounds normal. No respiratory distress. She has no wheezes.   Abdominal: Soft. Bowel sounds are normal. She exhibits no distension. There is no tenderness. There is no guarding. No hernia.   Musculoskeletal: She exhibits no edema or tenderness.   Neurological: She is alert.   Skin: Skin is warm and dry. No rash noted. No erythema.   Psychiatric: She has a normal mood and affect. Her behavior is normal.   Nursing note and vitals reviewed.      Assessment:   Post-op, the patient is doing well.     Encounter Diagnoses   Name Primary?   • Obesity, Class II, BMI 35-39.9 Yes   • SHANDA (obstructive sleep apnea)    • Multiple joint  pain    • Depression with anxiety    • Prediabetes    • S/P laparoscopic sleeve gastrectomy        Plan:   Encouraged patient to be sure to get plenty of lean protein per day through small frequent meals all with a protein source.   Activity restrictions: none.   Recommended patient be sure to get at least 70 grams of protein per day by eating small, frequent meals all with high lean protein choices. Be sure to limit/cut back on daily carbohydrate intake. Discussed with the patient the recommended amount of water per day to intake- half of body weight in ounces. Reviewed vitamin requirements. Be sure to do routine exercise, 150 minutes per week minimum, including both cardio and strength training.     Instructions / Recommendations: dietary counseling recommended, recommended a daily protein intake of  grams, vitamin supplement(s) recommended, recommended exercising at least 150 minutes per week, behavior modifications recommended and instructed to call the office for concerns, questions, or problems.     The patient was instructed to follow up in 3 months .    Total time spent face to face was 20 minutes and 15 minutes was spent counseling.

## 2020-03-19 ENCOUNTER — OFFICE VISIT (OUTPATIENT)
Dept: BARIATRICS/WEIGHT MGMT | Facility: CLINIC | Age: 45
End: 2020-03-19

## 2020-03-19 VITALS
SYSTOLIC BLOOD PRESSURE: 129 MMHG | DIASTOLIC BLOOD PRESSURE: 82 MMHG | RESPIRATION RATE: 18 BRPM | TEMPERATURE: 97.2 F | HEART RATE: 70 BPM | BODY MASS INDEX: 40.65 KG/M2 | HEIGHT: 65 IN | WEIGHT: 244 LBS

## 2020-03-19 DIAGNOSIS — Z98.84 S/P LAPAROSCOPIC SLEEVE GASTRECTOMY: ICD-10-CM

## 2020-03-19 DIAGNOSIS — K21.9 GASTROESOPHAGEAL REFLUX DISEASE, ESOPHAGITIS PRESENCE NOT SPECIFIED: ICD-10-CM

## 2020-03-19 DIAGNOSIS — R10.13 EPIGASTRIC DISCOMFORT: ICD-10-CM

## 2020-03-19 DIAGNOSIS — Z71.3 DIETARY COUNSELING: ICD-10-CM

## 2020-03-19 DIAGNOSIS — E66.01 OBESITY, CLASS III, BMI 40-49.9 (MORBID OBESITY) (HCC): Primary | ICD-10-CM

## 2020-03-19 PROBLEM — E66.9 OBESITY, CLASS II, BMI 35-39.9: Status: RESOLVED | Noted: 2019-05-06 | Resolved: 2020-03-19

## 2020-03-19 PROBLEM — E66.812 OBESITY, CLASS II, BMI 35-39.9: Status: RESOLVED | Noted: 2019-05-06 | Resolved: 2020-03-19

## 2020-03-19 PROCEDURE — 99213 OFFICE O/P EST LOW 20 MIN: CPT | Performed by: NURSE PRACTITIONER

## 2020-03-19 RX ORDER — OMEPRAZOLE 20 MG/1
20 CAPSULE, DELAYED RELEASE ORAL DAILY
Qty: 30 CAPSULE | Refills: 3 | Status: SHIPPED | OUTPATIENT
Start: 2020-03-19 | End: 2020-06-11 | Stop reason: ALTCHOICE

## 2020-03-19 NOTE — PROGRESS NOTES
[facility] BARIATRIC SURGERY  [address] [facility] [address]  [facility] [id number] [phone]  [address] [facility]  [facility] [id number] [phone]  Dept: [phone]  3/19/2020      Jessica Mota.  [id number]  [id number]  1975  female      Chief Complaint   Patient presents with   • Follow-up     sleeve problems       BH Post-Op Bariatric Surgery:   Jessica Mota is status post Laparoscopic Sleeve/HH procedure, performed on 5/20/19     HPI:   Today's weight is 111 kg (244 lb) pounds, today's BMI is Body mass index is 40.6 kg/m²., she has a  gain of 5 pounds since the last visit and her weight loss since surgery is 94 pounds. The patient reports a decreased portion size and loss of appetite.      Jessica Mota denies n/v/d/regurg and reports epigastric/chest discomfort and burning like heartburn. Says her symptoms started about 6 days ago. She went to the ER at [facility] two days ago and her work up was negative for cardiac or blood clot. She has not been taking any OTC medication for her symptoms. She says the only change is stress/anxiety related to COVID-19. She has a follow up scheduled with her PCP and endocrinologist. She admits to eat salsa, oranges and limes multiple times a day and drinking some ETOH.     Diet and Exercise: Diet history reviewed and discussed with the patient. Weight loss/gains to date discussed with the patient. The patient states they are eating 70+ grams of protein per day. She reports eating 3 meals per day, a typical portion size of 1 cup, eating 2 snacks per day, drinking 4 or more 8-oz. glasses of water per day, no carbonated beverage consumption and exercising regularly- strength and kickboxing 4 times a week.     Supplements: bariatric.     Review of Systems   Constitutional: Positive for fatigue.   Gastrointestinal:        Epigastric discomfort, reflux   All other systems reviewed and are negative.      Patient Active Problem  List   Diagnosis   • SHANDA (obstructive sleep apnea)   • Vitamin D insufficiency   • Thyroid nodule   • Pulmonary embolism (CMS/HCC)   • Depression with anxiety   • Edema   • Dietary counseling   • Labile blood pressure   • Multiple joint pain   • Prediabetes   • S/P laparoscopic sleeve gastrectomy   • Obesity, Class III, BMI 40-49.9 (morbid obesity) (CMS/HCC)   • Epigastric discomfort   • Esophageal reflux       Past Medical History:   Diagnosis Date   • Anxiety    • Asthma     EXERCISE INDUCED ASTHMA    • Hirsutism    • History of cellulitis    • Joint pain     GENERALIZED   • PCOS (polycystic ovarian syndrome)    • PE (pulmonary thromboembolism) (CMS/HCC) 2018   • Plantar fasciitis    • Sleep apnea     CPAP   • Thyroid nodule    • Urinary urgency        The following portions of the patient's history were reviewed and updated as appropriate: allergies, current medications, past family history, past medical history, past social history, past surgical history and problem list.    Vitals:    03/19/20 1328   BP: 129/82   Pulse: 70   Resp: 18   Temp: 97.2 °F (36.2 °C)       Physical Exam   Constitutional: She is oriented to person, place, and time. She appears well-developed and well-nourished.   HENT:   Head: Normocephalic and atraumatic.   Eyes: EOM are normal.   Cardiovascular: Normal rate.   Pulmonary/Chest: Effort normal.   Abdominal: Soft. She exhibits no distension. There is no tenderness.   Musculoskeletal: Normal range of motion.   Neurological: She is alert and oriented to person, place, and time.   Skin: Skin is warm and dry.   Psychiatric: She has a normal mood and affect. Her behavior is normal. Judgment and thought content normal.   Vitals reviewed.      Assessment:   Post-op, the patient recently started having some heartburn and discomfort epigastrically.     Encounter Diagnoses   Name Primary?   • Obesity, Class III, BMI 40-49.9 (morbid obesity) (CMS/HCC) Yes   • S/P laparoscopic sleeve gastrectomy    •  Dietary counseling    • Epigastric discomfort    • Gastroesophageal reflux disease, esophagitis presence not specified        Plan:     Gave patient Rx for PPI. Discussed small bites, chewing well and going slow. Avoid tomato based, acidic, ETOH and don't lay down at least 3 hours prior to bedtime. Keep follow up with PCP as she may need SSRI or something for anxiety. Patient does still have her gallbladder so I recommended she avoid high fat foods. Will call me if her symptoms persist or worsen.  Encouraged patient to be sure to get plenty of lean protein per day through small frequent meals all with a protein source.   Activity restrictions: none.   Recommended patient be sure to get at least 70 grams of protein per day by eating small, frequent meals all with high lean protein choices. Be sure to limit/cut back on daily carbohydrate intake. Discussed with the patient the recommended amount of water per day to intake- half of body weight in ounces. Reviewed vitamin requirements. Be sure to do routine exercise, 150 minutes per week minimum, including both cardio and strength training.     Instructions / Recommendations: dietary counseling recommended, recommended a daily protein intake of  grams, vitamin supplement(s) recommended, recommended exercising at least 150 minutes per week, behavior modifications recommended and instructed to call the office for concerns, questions, or problems.     The patient was instructed to follow up as scheduled.     The patient was counseled regarding. Total time spent face to face was 20 minutes and 15 minutes was spent counseling.

## 2020-06-11 ENCOUNTER — OFFICE VISIT (OUTPATIENT)
Dept: BARIATRICS/WEIGHT MGMT | Facility: CLINIC | Age: 45
End: 2020-06-11

## 2020-06-11 VITALS
WEIGHT: 238 LBS | BODY MASS INDEX: 39.65 KG/M2 | RESPIRATION RATE: 18 BRPM | SYSTOLIC BLOOD PRESSURE: 139 MMHG | DIASTOLIC BLOOD PRESSURE: 89 MMHG | TEMPERATURE: 97.8 F | HEIGHT: 65 IN | HEART RATE: 80 BPM

## 2020-06-11 DIAGNOSIS — R10.13 EPIGASTRIC DISCOMFORT: ICD-10-CM

## 2020-06-11 DIAGNOSIS — K21.9 GASTROESOPHAGEAL REFLUX DISEASE, ESOPHAGITIS PRESENCE NOT SPECIFIED: ICD-10-CM

## 2020-06-11 DIAGNOSIS — Z98.84 S/P LAPAROSCOPIC SLEEVE GASTRECTOMY: ICD-10-CM

## 2020-06-11 DIAGNOSIS — E66.9 OBESITY, CLASS II, BMI 35-39.9: ICD-10-CM

## 2020-06-11 DIAGNOSIS — R13.19 OTHER DYSPHAGIA: Primary | ICD-10-CM

## 2020-06-11 DIAGNOSIS — Z71.3 DIETARY COUNSELING: ICD-10-CM

## 2020-06-11 PROCEDURE — 99214 OFFICE O/P EST MOD 30 MIN: CPT | Performed by: SURGERY

## 2020-06-11 RX ORDER — FLUTICASONE PROPIONATE 50 MCG
1 SPRAY, SUSPENSION (ML) NASAL DAILY
COMMUNITY
Start: 2020-05-04 | End: 2020-06-25

## 2020-06-11 RX ORDER — PANTOPRAZOLE SODIUM 40 MG/1
40 TABLET, DELAYED RELEASE ORAL DAILY
Qty: 30 TABLET | Refills: 5 | Status: SHIPPED | OUTPATIENT
Start: 2020-06-11 | End: 2020-08-25

## 2020-06-11 RX ORDER — ALBUTEROL SULFATE 90 UG/1
2 AEROSOL, METERED RESPIRATORY (INHALATION)
COMMUNITY
Start: 2020-05-04

## 2020-06-11 RX ORDER — SODIUM CHLORIDE, SODIUM LACTATE, POTASSIUM CHLORIDE, CALCIUM CHLORIDE 600; 310; 30; 20 MG/100ML; MG/100ML; MG/100ML; MG/100ML
30 INJECTION, SOLUTION INTRAVENOUS CONTINUOUS
Status: CANCELLED | OUTPATIENT
Start: 2020-06-19

## 2020-06-11 NOTE — PROGRESS NOTES
[facility] BARIATRIC SURGERY  [address] [address]  [phone]  [address] [address]  [phone]  Dept: [phone]  6/11/2020      Jessica Mota.  [id number]  [id number]  1975  female      Chief Complaint   Patient presents with   • Follow-up     1 year sleeve        BH Post-Op Bariatric Surgery:   Jessica Mota is status post Laparoscopic Sleeve/HH procedure, performed on 5/20/19     HPI:   Today's weight is 108 kg (238 lb) pounds, today's BMI is Body mass index is 39.61 kg/m²., she has a  loss of 6 pounds since the last visit and her weight loss since surgery is 100 pounds. The patient reports a decreased portion size and loss of appetite.      Jessica Mota denies fever chills vomiting or extremity pain and reports difficulty with certain solid foods and heartburn.  Patient states that she feels like she is eating too fast and also having issues at night.  She started drinking alcohol after work prior to going to bed.  She denies any pain in the right lower quadrant with radiation to her back described as a fullness feeling.     Diet and Exercise: Diet history reviewed and discussed with the patient. Weight loss/gains to date discussed with the patient. The patient states they are eating 70 grams of protein per day. She reports eating 3 meals per day, a typical portion size of 1 cup, eating 2 snacks per day, drinking 5 or more 8-oz. glasses of water per day, no carbonated beverage consumption and exercising regularly.     Supplements: Bariatric per gastric sleeve.     Review of Systems   Constitutional: Positive for fatigue.   Gastrointestinal: Positive for abdominal pain and nausea.   Musculoskeletal: Positive for arthralgias.   All other systems reviewed and are negative.      Patient Active Problem List   Diagnosis   • SHANDA (obstructive sleep apnea)   • Vitamin D insufficiency   • Thyroid nodule   •  Pulmonary embolism (CMS/[facility])   • Depression with anxiety   • Edema   • Dietary counseling   • Labile blood pressure   • Multiple joint pain   • Prediabetes   • Obesity, Class II, BMI 35-39.9   • S/P laparoscopic sleeve gastrectomy   • Obesity, Class III, BMI 40-49.9 (morbid obesity) (CMS/[facility])   • Epigastric discomfort   • Esophageal reflux   • Other dysphagia       Past Medical History:   Diagnosis Date   • Anxiety    • Asthma     EXERCISE INDUCED ASTHMA    • Hirsutism    • History of cellulitis    • Joint pain     GENERALIZED   • PCOS (polycystic ovarian syndrome)    • PE (pulmonary thromboembolism) (CMS/[facility]) 2018   • Plantar fasciitis    • Sleep apnea     CPAP   • Thyroid nodule    • Urinary urgency        The following portions of the patient's history were reviewed and updated as appropriate: allergies, current medications, past family history, past medical history, past social history, past surgical history and problem list.    Vitals:    06/11/20 1447   BP: 139/89   Pulse: 80   Resp: 18   Temp: 97.8 °F (36.6 °C)       Physical Exam   Constitutional: She is oriented to person, place, and time. She appears well-nourished.   HENT:   Head: Normocephalic and atraumatic.   Mouth/Throat: Oropharynx is clear and moist.   Eyes: Pupils are equal, round, and reactive to light. Conjunctivae and EOM are normal. No scleral icterus.   Neck: Normal range of motion. Neck supple. No thyromegaly present.   Cardiovascular: Normal rate and regular rhythm.   Pulmonary/Chest: Effort normal and breath sounds normal.   Abdominal: Soft. Bowel sounds are normal. She exhibits no distension and no mass. There is no tenderness. There is no rebound and no guarding. No hernia.   Musculoskeletal: Normal range of motion.   Lymphadenopathy:     She has no cervical adenopathy.   Neurological: She is alert and oriented to person, place, and time. No cranial nerve deficit. Coordination normal.   Skin: Skin is warm and dry. No erythema.    Psychiatric: She has a normal mood and affect. Her behavior is normal.   Vitals reviewed.      Assessment:   Post-op, the patient 1 year status post laparoscopic sleeve gastrectomy and hiatal hernia repair with complaints of dysphagia with solids and heartburn.  Patient is extremely eating too fast which I instructed her to slow down and concentrate more when he is resting with her food.  Also not to drink alcohol regularly.:  Sinus and plan for upper endoscopy with probable dilatation.  The risks and benefits of the procedure were discussed with the patient in detail and all questions were answered.  Possibility of perforation, bleeding, aspiration, anoxic brain injury, respiratory and/or cardiac arrest and death were discussed.  Consent will be signed and witnessed..     Encounter Diagnoses   Name Primary?   • Other dysphagia Yes   • Gastroesophageal reflux disease, esophagitis presence not specified    • S/P laparoscopic sleeve gastrectomy    • Dietary counseling    • Obesity, Class II, BMI 35-39.9    • Epigastric discomfort        Plan:     Encouraged patient to be sure to get plenty of lean protein per day through small frequent meals all with a protein source.   Activity restrictions: none.   Recommended patient be sure to get at least 70 grams of protein per day by eating small, frequent meals all with high lean protein choices. Be sure to limit/cut back on daily carbohydrate intake. Discussed with the patient the recommended amount of water per day to intake- half of body weight in ounces. Reviewed vitamin requirements. Be sure to do routine exercise, 150 minutes per week minimum, including both cardio and strength training.     Instructions / Recommendations: dietary counseling recommended, recommended a daily protein intake of  grams, vitamin supplement(s) recommended, recommended exercising at least 150 minutes per week, behavior modifications recommended and instructed to call the office for  concerns, questions, or problems.     The patient was instructed to follow up in 2 to 3 weeks.     The patient was counseled regarding. Total time spent face to face was 25 minutes and 20 minutes was spent counseling.

## 2020-06-12 PROBLEM — K21.9 GASTROESOPHAGEAL REFLUX DISEASE: Status: ACTIVE | Noted: 2020-06-12

## 2020-06-15 ENCOUNTER — TRANSCRIBE ORDERS (OUTPATIENT)
Dept: SLEEP MEDICINE | Facility: HOSPITAL | Age: 45
End: 2020-06-15

## 2020-06-15 DIAGNOSIS — Z01.818 OTHER SPECIFIED PRE-OPERATIVE EXAMINATION: Primary | ICD-10-CM

## 2020-06-17 ENCOUNTER — LAB (OUTPATIENT)
Dept: LAB | Facility: HOSPITAL | Age: 45
End: 2020-06-17

## 2020-06-24 ENCOUNTER — LAB (OUTPATIENT)
Dept: LAB | Facility: HOSPITAL | Age: 45
End: 2020-06-24

## 2020-06-24 DIAGNOSIS — Z01.818 OTHER SPECIFIED PRE-OPERATIVE EXAMINATION: ICD-10-CM

## 2020-06-24 PROCEDURE — U0004 COV-19 TEST NON-CDC HGH THRU: HCPCS

## 2020-06-25 LAB
REF LAB TEST METHOD: NORMAL
SARS-COV-2 RNA RESP QL NAA+PROBE: NOT DETECTED

## 2020-06-25 RX ORDER — HYDROXYZINE HYDROCHLORIDE 10 MG/1
10 TABLET, FILM COATED ORAL AS NEEDED
COMMUNITY
Start: 2020-06-16 | End: 2020-07-16

## 2020-06-25 RX ORDER — PANTOPRAZOLE SODIUM 40 MG/1
40 TABLET, DELAYED RELEASE ORAL DAILY
COMMUNITY
Start: 2020-06-11 | End: 2020-06-25

## 2020-06-25 RX ORDER — MELOXICAM 7.5 MG/1
7.5 TABLET ORAL 2 TIMES DAILY PRN
COMMUNITY
Start: 2020-06-23 | End: 2020-07-07

## 2020-06-26 ENCOUNTER — ANESTHESIA (OUTPATIENT)
Dept: GASTROENTEROLOGY | Facility: HOSPITAL | Age: 45
End: 2020-06-26

## 2020-06-26 ENCOUNTER — HOSPITAL ENCOUNTER (OUTPATIENT)
Facility: HOSPITAL | Age: 45
Setting detail: HOSPITAL OUTPATIENT SURGERY
Discharge: HOME | End: 2020-06-26
Attending: SURGERY | Admitting: SURGERY

## 2020-06-26 ENCOUNTER — ANESTHESIA EVENT (OUTPATIENT)
Dept: GASTROENTEROLOGY | Facility: HOSPITAL | Age: 45
End: 2020-06-26

## 2020-06-26 VITALS
HEART RATE: 65 BPM | SYSTOLIC BLOOD PRESSURE: 117 MMHG | WEIGHT: 242.8 LBS | OXYGEN SATURATION: 98 % | HEIGHT: 65 IN | RESPIRATION RATE: 16 BRPM | BODY MASS INDEX: 40.45 KG/M2 | DIASTOLIC BLOOD PRESSURE: 79 MMHG

## 2020-06-26 DIAGNOSIS — Z98.84 S/P LAPAROSCOPIC SLEEVE GASTRECTOMY: ICD-10-CM

## 2020-06-26 DIAGNOSIS — K21.9 GASTROESOPHAGEAL REFLUX DISEASE, ESOPHAGITIS PRESENCE NOT SPECIFIED: ICD-10-CM

## 2020-06-26 DIAGNOSIS — Z71.3 DIETARY COUNSELING: ICD-10-CM

## 2020-06-26 DIAGNOSIS — R13.19 OTHER DYSPHAGIA: ICD-10-CM

## 2020-06-26 DIAGNOSIS — E66.9 OBESITY, CLASS II, BMI 35-39.9: ICD-10-CM

## 2020-06-26 DIAGNOSIS — R10.13 EPIGASTRIC DISCOMFORT: ICD-10-CM

## 2020-06-26 PROCEDURE — 87081 CULTURE SCREEN ONLY: CPT | Performed by: SURGERY

## 2020-06-26 PROCEDURE — 43239 EGD BIOPSY SINGLE/MULTIPLE: CPT | Performed by: SURGERY

## 2020-06-26 PROCEDURE — C1726 CATH, BAL DIL, NON-VASCULAR: HCPCS | Performed by: SURGERY

## 2020-06-26 PROCEDURE — 43245 EGD DILATE STRICTURE: CPT | Performed by: SURGERY

## 2020-06-26 PROCEDURE — 88312 SPECIAL STAINS GROUP 1: CPT | Performed by: SURGERY

## 2020-06-26 PROCEDURE — 88305 TISSUE EXAM BY PATHOLOGIST: CPT | Performed by: SURGERY

## 2020-06-26 PROCEDURE — 25010000002 PROPOFOL 10 MG/ML EMULSION: Performed by: ANESTHESIOLOGY

## 2020-06-26 RX ORDER — PROPOFOL 10 MG/ML
VIAL (ML) INTRAVENOUS AS NEEDED
Status: DISCONTINUED | OUTPATIENT
Start: 2020-06-26 | End: 2020-06-26 | Stop reason: SURG

## 2020-06-26 RX ORDER — PROPOFOL 10 MG/ML
VIAL (ML) INTRAVENOUS CONTINUOUS PRN
Status: DISCONTINUED | OUTPATIENT
Start: 2020-06-26 | End: 2020-06-26 | Stop reason: SURG

## 2020-06-26 RX ORDER — SODIUM CHLORIDE, SODIUM LACTATE, POTASSIUM CHLORIDE, CALCIUM CHLORIDE 600; 310; 30; 20 MG/100ML; MG/100ML; MG/100ML; MG/100ML
30 INJECTION, SOLUTION INTRAVENOUS CONTINUOUS
Status: DISCONTINUED | OUTPATIENT
Start: 2020-06-26 | End: 2020-06-26 | Stop reason: HOSPADM

## 2020-06-26 RX ORDER — LIDOCAINE HYDROCHLORIDE 20 MG/ML
INJECTION, SOLUTION INFILTRATION; PERINEURAL AS NEEDED
Status: DISCONTINUED | OUTPATIENT
Start: 2020-06-26 | End: 2020-06-26 | Stop reason: SURG

## 2020-06-26 RX ADMIN — PROPOFOL 150 MG: 10 INJECTION, EMULSION INTRAVENOUS at 07:27

## 2020-06-26 RX ADMIN — SODIUM CHLORIDE, POTASSIUM CHLORIDE, SODIUM LACTATE AND CALCIUM CHLORIDE: 600; 310; 30; 20 INJECTION, SOLUTION INTRAVENOUS at 07:22

## 2020-06-26 RX ADMIN — LIDOCAINE HYDROCHLORIDE 60 MG: 20 INJECTION, SOLUTION INFILTRATION; PERINEURAL at 07:27

## 2020-06-26 RX ADMIN — PROPOFOL 250 MCG/KG/MIN: 10 INJECTION, EMULSION INTRAVENOUS at 07:27

## 2020-06-26 NOTE — ANESTHESIA PREPROCEDURE EVALUATION
Anesthesia Evaluation     Patient summary reviewed and Nursing notes reviewed                Airway   Mallampati: III  TM distance: >3 FB  Neck ROM: full  Possible difficult intubation  Dental - normal exam     Pulmonary - normal exam   (+) pulmonary embolism, a smoker Former, asthma,sleep apnea,   Cardiovascular     Rhythm: regular  Rate: abnormal      PE comment: SB/rate 50    Neuro/Psych  (+) psychiatric history Anxiety and Depression,     GI/Hepatic/Renal/Endo    (+) obesity, morbid obesity, GERD,      ROS Comment: Hx gastric sleeve    Musculoskeletal     Abdominal   (+) obese,    Substance History      OB/GYN          Other                      Anesthesia Plan    ASA 3     MAC     intravenous induction     Anesthetic plan, all risks, benefits, and alternatives have been provided, discussed and informed consent has been obtained with: patient.

## 2020-06-26 NOTE — ANESTHESIA POSTPROCEDURE EVALUATION
Patient: Jessica Mota    Procedure Summary     Date:  06/26/20 Room / Location:   [facility]    Anesthesia Start:  0722 Anesthesia Stop:  0738    Procedure:  ESOPHAGOGASTRODUODENOSCOPY WITH biopsy and balloon DILATATION 18, 19, 20 (N/A Esophagus) Diagnosis:       Other dysphagia      Gastroesophageal reflux disease, esophagitis presence not specified      S/P laparoscopic sleeve gastrectomy      Dietary counseling      Obesity, Class II, BMI 35-39.9      Epigastric discomfort      (Other dysphagia [R13.19])      (Gastroesophageal reflux disease, esophagitis presence not specified [K21.9])      (S/P laparoscopic sleeve gastrectomy [Z98.84])      (Dietary counseling [Z71.3])      (Obesity, Class II, BMI 35-39.9 [E66.9])      (Epigastric discomfort [R10.13])    Surgeon:  Stevan Hook Jr., MD Provider:  Boom Sullivan MD    Anesthesia Type:  MAC ASA Status:  3          Anesthesia Type: MAC    Vitals  No vitals data found for the desired time range.          Post Anesthesia Care and Evaluation    Patient location during evaluation: PHASE II  Patient participation: complete - patient participated  Level of consciousness: awake and alert  Pain management: adequate  Airway patency: patent  Anesthetic complications: No anesthetic complications  PONV Status: none  Cardiovascular status: acceptable  Respiratory status: acceptable  Hydration status: acceptable

## 2020-06-27 LAB — UREASE TISS QL: POSITIVE

## 2020-06-29 RX ORDER — OMEPRAZOLE 20 MG/1
CAPSULE, DELAYED RELEASE ORAL
Qty: 90 CAPSULE | Refills: 1 | OUTPATIENT
Start: 2020-06-29

## 2020-06-30 ENCOUNTER — TELEPHONE (OUTPATIENT)
Dept: BARIATRICS/WEIGHT MGMT | Facility: CLINIC | Age: 45
End: 2020-06-30

## 2020-06-30 LAB
CYTO UR: NORMAL
LAB AP CASE REPORT: NORMAL
PATH REPORT.FINAL DX SPEC: NORMAL
PATH REPORT.GROSS SPEC: NORMAL

## 2020-06-30 NOTE — TELEPHONE ENCOUNTER
Tissue pathology came back negative for H pylori but positive breath test. I spoke with Dr. Hook regarding what he would like to do, and was instructed to not treat H pylori at this time. Dr. Hook did want to suggest to the patient to be retested in 6 months if she wanted to.     I spoke with patient and informed her of the plan, patient agreed. Patient had no other questions at this time.      ----- Message from Stevan Hook Jr., MD sent at 6/30/2020  7:51 AM EDT -----  Please call patient with negative results.

## 2020-07-01 ENCOUNTER — OFFICE VISIT (OUTPATIENT)
Dept: BARIATRICS/WEIGHT MGMT | Facility: CLINIC | Age: 45
End: 2020-07-01

## 2020-07-01 DIAGNOSIS — E55.9 VITAMIN D INSUFFICIENCY: ICD-10-CM

## 2020-07-01 DIAGNOSIS — Z98.84 S/P LAPAROSCOPIC SLEEVE GASTRECTOMY: ICD-10-CM

## 2020-07-01 DIAGNOSIS — E66.01 OBESITY, CLASS III, BMI 40-49.9 (MORBID OBESITY) (HCC): Primary | ICD-10-CM

## 2020-07-01 DIAGNOSIS — E66.9 OBESITY, CLASS II, BMI 35-39.9: ICD-10-CM

## 2020-07-01 DIAGNOSIS — G47.33 OSA (OBSTRUCTIVE SLEEP APNEA): ICD-10-CM

## 2020-07-01 DIAGNOSIS — R73.03 PREDIABETES: ICD-10-CM

## 2020-07-01 DIAGNOSIS — R60.0 LOCALIZED EDEMA: ICD-10-CM

## 2020-07-02 NOTE — PROGRESS NOTES
Jessica Mota.  [id number]  [id number]  1975  female    Bariatric Nutrition Assessment Follow-Up    Procedure Performed:  Sleeve  Date of Surgery: 5/20/19    Surgery wt: 338  1 year post-op: 238  1 year 2 months/current: 245    Present Diet:  High protein     Stated Problem Areas/Concerns:  heartburn    Assessment/Recommendations:  Patient states that over the weekend she went off track due to being at a social event. She did track her food but is back on track. Food recall: 3 oz cottage cheese, handful of mixed nuts, 6 sliced cucumbers with ranch seasoning, 2 strawberries; L: 4 oz veg soup; S: activia, fair life protein, strawberries; D: tuna and cottage cheese; 558 su, 71g prot, 46g carb, 9g fat. She reports typical intake of 600-1,000 su. She walks daily for 1-2 hours and does strength exercises at home 3 days per week. She has been having issues with heartburn, stopped drinking wine coolers but still drinks wine and liquor. Her fluid has decreased. Taking BA MVI and Ca    Goals/Plan:  Reviewed heartburn trigger foods including alcohol. Advised patient to eat slow, put fork down between bites. Provided exercises to do at home. Keep up the good work tracking food, exercising, and focusing on protein. Increase fluid intake to 64oz.    Follow-Up:  Follow up as needed      Electronically signed by:  Ariadna Rogel RD  08/29/19 11:51 AM

## 2020-07-31 ENCOUNTER — TELEPHONE (OUTPATIENT)
Dept: BARIATRICS/WEIGHT MGMT | Facility: CLINIC | Age: 45
End: 2020-07-31

## 2020-07-31 DIAGNOSIS — R11.2 NAUSEA AND VOMITING, INTRACTABILITY OF VOMITING NOT SPECIFIED, UNSPECIFIED VOMITING TYPE: Primary | ICD-10-CM

## 2020-07-31 NOTE — TELEPHONE ENCOUNTER
Patient had GS surgery 5/20/19 with HH repair. She called this morning complaining of nausea and vomiting/frothing x a few days. Patient stated she is able to drink small sips of water. Solid food will go down with no issues but about an hour later come back up. Patient denied any fever, dysphagia or diarrhea but was complaining of constipation. Patient has previously had gallstones and feels like the pain is similar.    EGD with dilatation was done 6/26 for similar issues.    Discussed with Dr. Hook  And he would like to set up patient for an ultrasound to see if gallstones are present, if so, patient will be referred to Dr. Callahan, general surgery. If ultrasound is unremarkable, patient will follow up with us in the office to decide what next step will be. Patient agreed with the plan and orders will be placed.     Patient was advised that if pain worsens and she feels that she needs immediate attention, to go to emergency room. Again, the patient agreed with the plan.

## 2020-08-03 ENCOUNTER — TRANSCRIBE ORDERS (OUTPATIENT)
Dept: PHYSICAL THERAPY | Facility: CLINIC | Age: 45
End: 2020-08-03

## 2020-08-03 DIAGNOSIS — S46.911A STRAIN OF RIGHT SHOULDER, INITIAL ENCOUNTER: Primary | ICD-10-CM

## 2020-08-05 ENCOUNTER — TREATMENT (OUTPATIENT)
Dept: PHYSICAL THERAPY | Facility: CLINIC | Age: 45
End: 2020-08-05

## 2020-08-05 DIAGNOSIS — S46.911D RIGHT SHOULDER STRAIN, SUBSEQUENT ENCOUNTER: Primary | ICD-10-CM

## 2020-08-05 PROCEDURE — 97162 PT EVAL MOD COMPLEX 30 MIN: CPT | Performed by: PHYSICAL THERAPIST

## 2020-08-05 PROCEDURE — 97014 ELECTRIC STIMULATION THERAPY: CPT | Performed by: PHYSICAL THERAPIST

## 2020-08-05 PROCEDURE — 97110 THERAPEUTIC EXERCISES: CPT | Performed by: PHYSICAL THERAPIST

## 2020-08-05 NOTE — PROGRESS NOTES
Physical Therapy Initial Evaluation and Plan of Care    Patient: Jessica Mota   : 1975  Diagnosis/ICD-10 Code:  Right shoulder strain, subsequent encounter [S46.911D]  Referring practitioner: NANCY Denton    Subjective Evaluation    History of Present Illness  Mechanism of injury: PMH:    Subjective comment: Patient is a 45 year old female who presents with a diagnosis of right shoulder strain.  She reports 2 week history of right shoulder pain after pulling metal cart loaded with her right arm across her body at work.  She reports cart became hooked on pallet roller and when she pushed it hurt her shoulder.  Reports no prior pain in her shoulder.    Patient Occupation:    Precautions and Work Restrictions: On modified dutyQuality of life: good    Pain  Current pain ratin  At best pain ratin  At worst pain rating: 10  Pain location: Right shoulder   Quality: Sharp/shooting.  Relieving factors: medications and rest  Aggravating factors: lifting and repetitive movement  Progression: no change             Objective          Palpation     Right   Hypertonic in the upper trapezius. Tenderness of the biceps, pectoralis major and upper trapezius.     Cervical/Thoracic Screen   Cervical range of motion within normal limits with the following exceptions: WFL w/o pain    Active Range of Motion   Left Shoulder   Flexion: 158 degrees   Abduction: 170 degrees   External rotation 90°: 95 degrees   External rotation BTH: T9   Internal rotation BTB: T9     Right Shoulder   Flexion: 85 degrees   Abduction: 72 degrees   External rotation 45°: 45 degrees   Internal rotation BTB: sacrum     Strength/Myotome Testing     Left Shoulder     Planes of Motion   Flexion: 4+   Extension: 4+   External rotation at 90°: 5   Internal rotation at 0°: 5     Right Shoulder     Planes of Motion   Flexion: 2+   Extension: 2+   External rotation at 90°: 4   Internal rotation at 0°: 5     Additional Strength Details    strength R - 25 lbs. L 42 lbs.     Elbow FL - R 4-/5 with pain - anterior shoulder  Horizontal adduction 4-/5 with pain - posterior shoulder     Tests     Additional Tests Details  Negative Spurling's R/L           Assessment & Plan     Assessment  Impairments: abnormal or restricted ROM, activity intolerance, impaired physical strength, lacks appropriate home exercise program, pain with function and safety issue  Assessment details: Presents with limits in active and passive right shoulder pain, decreased strength in the right shoulder and elbow/wrist, pain to palpation along the pectoralis major/biceps/upper trapezius and activity limits per the above  Prognosis: good  Prognosis details: ST. R shoulder and UE pain decreased 20% or greater over 2 weeks with ADL's.  2. R shoulder FL AROM improved 15 degrees or greater without increased pain over 2 weeks.  3. Independent and compliant with HEP over 2 weeks.     LT. Quick DASH for ADL's and work improved to 40% or less over 6 weeks.   2. R shoulder and UE strength = L within 6 weeks.   3. R shoulder and UE pain 2/10 at worst with ADL's over 6 weeks.     Functional Limitations: carrying objects, lifting, sleeping, pulling, pushing, reaching behind back, reaching overhead and unable to perform repetitive tasks  Plan  Therapy options: will be seen for skilled physical therapy services  Planned modality interventions: electrical stimulation/Russian stimulation, TENS, thermotherapy (hydrocollator packs) and cryotherapy  Planned therapy interventions: manual therapy, neuromuscular re-education, soft tissue mobilization, flexibility, functional ROM exercises, strengthening, stretching, home exercise program, therapeutic activities and joint mobilization  Frequency: 2x week  Duration in visits: 12  Treatment plan discussed with: patient        Timed:         Manual Therapy:    5     mins  68942;     Therapeutic Exercise:    12     mins  30099;          Un-Timed:  Electrical Stimulation:    15     mins  22932 ( );  Mod Eval     25     Mins  78431      Timed Treatment:   17   mins   Total Treatment:     57   mins    PT SIGNATURE: Tanvir Martinez PT, DPT       DATE TREATMENT INITIATED: 8/5/2020    Initial Certification  Certification Period: 11/3/2020  I certify that the therapy services are furnished while this patient is under my care.  The services outlined above are required by this patient, and will be reviewed every 90 days.     PHYSICIAN: Stevan Diallo PA      DATE:     Please sign and return via fax to [phone].. Thank you, [facility].

## 2020-08-07 ENCOUNTER — TREATMENT (OUTPATIENT)
Dept: PHYSICAL THERAPY | Facility: CLINIC | Age: 45
End: 2020-08-07

## 2020-08-07 DIAGNOSIS — S46.911D RIGHT SHOULDER STRAIN, SUBSEQUENT ENCOUNTER: Primary | ICD-10-CM

## 2020-08-07 PROCEDURE — 97014 ELECTRIC STIMULATION THERAPY: CPT | Performed by: PHYSICAL THERAPIST

## 2020-08-07 PROCEDURE — 97110 THERAPEUTIC EXERCISES: CPT | Performed by: PHYSICAL THERAPIST

## 2020-08-07 NOTE — PROGRESS NOTES
Physical Therapy Daily Progress Note      Patient: Jessica Mota   : 1975  Diagnosis/ICD-10 Code:  Right shoulder strain, subsequent encounter [S46.911D]  Referring practitioner: No ref. provider found  Date of Initial Visit: Type: THERAPY  Noted: 2020  Today's Date: 2020  Patient seen for 2 sessions             Subjective Pt continues to report having pain in her shoulder.  No changes noted since initial visit.    Objective   See Exercise, Manual, and Modality Logs for complete treatment.       Assessment/Plan  Pt had c/o discomfort and pain in her shoulder with a few of her exercises, mainly ROM beyond 90 degrees flexion.  Stim and MH were tolerated well.    Progress per Plan of Care           Timed:         Manual Therapy:         mins  67002;     Therapeutic Exercise:    25     mins  69043;     Neuromuscular Analia:        mins  73101;    Therapeutic Activity:          mins  60650;     Gait Training:           mins  76865;     Ultrasound:          mins  41682;    Ionto                                   mins   67482  Self Care                            mins   52531      Un-Timed:  Electrical Stimulation:    15     mins  97730 ( );  Dry Needling          mins self-pay  Traction          mins 07640      Timed Treatment:   25   mins   Total Treatment:     40   mins    Tra Salomon PTA  Physical Therapist Assistant

## 2020-08-12 ENCOUNTER — HOSPITAL ENCOUNTER (OUTPATIENT)
Dept: ULTRASOUND IMAGING | Facility: HOSPITAL | Age: 45
Discharge: HOME | End: 2020-08-12
Admitting: SURGERY

## 2020-08-12 ENCOUNTER — LAB (OUTPATIENT)
Dept: LAB | Facility: HOSPITAL | Age: 45
End: 2020-08-12

## 2020-08-12 ENCOUNTER — HOSPITAL ENCOUNTER (OUTPATIENT)
Dept: ULTRASOUND IMAGING | Facility: HOSPITAL | Age: 45
Discharge: HOME | End: 2020-08-12

## 2020-08-12 DIAGNOSIS — R73.03 PREDIABETES: ICD-10-CM

## 2020-08-12 DIAGNOSIS — E55.9 VITAMIN D INSUFFICIENCY: ICD-10-CM

## 2020-08-12 DIAGNOSIS — E66.01 OBESITY, CLASS III, BMI 40-49.9 (MORBID OBESITY) (HCC): ICD-10-CM

## 2020-08-12 DIAGNOSIS — Z98.84 S/P LAPAROSCOPIC SLEEVE GASTRECTOMY: ICD-10-CM

## 2020-08-12 DIAGNOSIS — G47.33 OSA (OBSTRUCTIVE SLEEP APNEA): ICD-10-CM

## 2020-08-12 DIAGNOSIS — R11.2 NAUSEA AND VOMITING, INTRACTABILITY OF VOMITING NOT SPECIFIED, UNSPECIFIED VOMITING TYPE: ICD-10-CM

## 2020-08-12 DIAGNOSIS — R60.0 LOCALIZED EDEMA: ICD-10-CM

## 2020-08-12 LAB
25(OH)D3 SERPL-MCNC: 59 NG/ML (ref 30–100)
ALBUMIN SERPL-MCNC: 4.5 G/DL (ref 3.5–5.2)
ALBUMIN/GLOB SERPL: 1.5 G/DL
ALP SERPL-CCNC: 66 U/L (ref 39–117)
ALT SERPL W P-5'-P-CCNC: 19 U/L (ref 1–33)
ANION GAP SERPL CALCULATED.3IONS-SCNC: 7.7 MMOL/L (ref 5–15)
AST SERPL-CCNC: 18 U/L (ref 1–32)
BASOPHILS # BLD AUTO: 0.03 10*3/MM3 (ref 0–0.2)
BASOPHILS NFR BLD AUTO: 0.4 % (ref 0–1.5)
BILIRUB SERPL-MCNC: 0.5 MG/DL (ref 0–1.2)
BUN SERPL-MCNC: 12 MG/DL (ref 6–20)
BUN/CREAT SERPL: 13 (ref 7–25)
CALCIUM SPEC-SCNC: 9.3 MG/DL (ref 8.6–10.5)
CHLORIDE SERPL-SCNC: 103 MMOL/L (ref 98–107)
CO2 SERPL-SCNC: 28.3 MMOL/L (ref 22–29)
CREAT SERPL-MCNC: 0.92 MG/DL (ref 0.57–1)
DEPRECATED RDW RBC AUTO: 43 FL (ref 37–54)
EOSINOPHIL # BLD AUTO: 0.14 10*3/MM3 (ref 0–0.4)
EOSINOPHIL NFR BLD AUTO: 2 % (ref 0.3–6.2)
ERYTHROCYTE [DISTWIDTH] IN BLOOD BY AUTOMATED COUNT: 13.1 % (ref 12.3–15.4)
FERRITIN SERPL-MCNC: 122 NG/ML (ref 13–150)
FOLATE SERPL-MCNC: >20 NG/ML (ref 4.78–24.2)
GFR SERPL CREATININE-BSD FRML MDRD: 66 ML/MIN/1.73
GFR SERPL CREATININE-BSD FRML MDRD: 80 ML/MIN/1.73
GLOBULIN UR ELPH-MCNC: 3 GM/DL
GLUCOSE SERPL-MCNC: 85 MG/DL (ref 65–99)
HBA1C MFR BLD: 5.34 % (ref 4.8–5.6)
HCT VFR BLD AUTO: 41.9 % (ref 34–46.6)
HGB BLD-MCNC: 14.3 G/DL (ref 12–15.9)
IMM GRANULOCYTES # BLD AUTO: 0.02 10*3/MM3 (ref 0–0.05)
IMM GRANULOCYTES NFR BLD AUTO: 0.3 % (ref 0–0.5)
IRON 24H UR-MRATE: 79 MCG/DL (ref 37–145)
LYMPHOCYTES # BLD AUTO: 2.4 10*3/MM3 (ref 0.7–3.1)
LYMPHOCYTES NFR BLD AUTO: 34.3 % (ref 19.6–45.3)
MCH RBC QN AUTO: 30.6 PG (ref 26.6–33)
MCHC RBC AUTO-ENTMCNC: 34.1 G/DL (ref 31.5–35.7)
MCV RBC AUTO: 89.7 FL (ref 79–97)
MONOCYTES # BLD AUTO: 0.35 10*3/MM3 (ref 0.1–0.9)
MONOCYTES NFR BLD AUTO: 5 % (ref 5–12)
NEUTROPHILS NFR BLD AUTO: 4.06 10*3/MM3 (ref 1.7–7)
NEUTROPHILS NFR BLD AUTO: 58 % (ref 42.7–76)
NRBC BLD AUTO-RTO: 0 /100 WBC (ref 0–0.2)
PLATELET # BLD AUTO: 246 10*3/MM3 (ref 140–450)
PMV BLD AUTO: 9.7 FL (ref 6–12)
POTASSIUM SERPL-SCNC: 4 MMOL/L (ref 3.5–5.2)
PREALB SERPL-MCNC: 26.6 MG/DL (ref 20–40)
PROT SERPL-MCNC: 7.5 G/DL (ref 6–8.5)
PTH-INTACT SERPL-MCNC: 43.6 PG/ML (ref 15–65)
RBC # BLD AUTO: 4.67 10*6/MM3 (ref 3.77–5.28)
SODIUM SERPL-SCNC: 139 MMOL/L (ref 136–145)
WBC # BLD AUTO: 7 10*3/MM3 (ref 3.4–10.8)

## 2020-08-12 PROCEDURE — 82306 VITAMIN D 25 HYDROXY: CPT | Performed by: NURSE PRACTITIONER

## 2020-08-12 PROCEDURE — 76705 ECHO EXAM OF ABDOMEN: CPT

## 2020-08-12 PROCEDURE — 82746 ASSAY OF FOLIC ACID SERUM: CPT | Performed by: NURSE PRACTITIONER

## 2020-08-12 PROCEDURE — 83036 HEMOGLOBIN GLYCOSYLATED A1C: CPT | Performed by: NURSE PRACTITIONER

## 2020-08-12 PROCEDURE — 85025 COMPLETE CBC W/AUTO DIFF WBC: CPT | Performed by: NURSE PRACTITIONER

## 2020-08-12 PROCEDURE — 84590 ASSAY OF VITAMIN A: CPT | Performed by: NURSE PRACTITIONER

## 2020-08-12 PROCEDURE — 36415 COLL VENOUS BLD VENIPUNCTURE: CPT

## 2020-08-12 PROCEDURE — 84134 ASSAY OF PREALBUMIN: CPT | Performed by: NURSE PRACTITIONER

## 2020-08-12 PROCEDURE — 83921 ORGANIC ACID SINGLE QUANT: CPT | Performed by: NURSE PRACTITIONER

## 2020-08-12 PROCEDURE — 80053 COMPREHEN METABOLIC PANEL: CPT | Performed by: NURSE PRACTITIONER

## 2020-08-12 PROCEDURE — 82728 ASSAY OF FERRITIN: CPT | Performed by: NURSE PRACTITIONER

## 2020-08-12 PROCEDURE — 84597 ASSAY OF VITAMIN K: CPT | Performed by: NURSE PRACTITIONER

## 2020-08-12 PROCEDURE — 84446 ASSAY OF VITAMIN E: CPT | Performed by: NURSE PRACTITIONER

## 2020-08-12 PROCEDURE — 83540 ASSAY OF IRON: CPT | Performed by: NURSE PRACTITIONER

## 2020-08-12 PROCEDURE — 83970 ASSAY OF PARATHORMONE: CPT | Performed by: NURSE PRACTITIONER

## 2020-08-13 DIAGNOSIS — K81.9 CHOLECYSTITIS: Primary | ICD-10-CM

## 2020-08-17 LAB
Lab: NORMAL
METHYLMALONATE SERPL-SCNC: 92 NMOL/L (ref 0–378)

## 2020-08-18 LAB
A-TOCOPHEROL VIT E SERPL-MCNC: 16.9 MG/L (ref 7–25.1)
GAMMA TOCOPHEROL SERPL-MCNC: 1.6 MG/L (ref 0.5–5.5)
VIT A SERPL-MCNC: 49.6 UG/DL (ref 20.1–62)

## 2020-08-19 LAB — PHYTONADIONE SERPL-MCNC: 0.21 NG/ML (ref 0.13–1.88)

## 2020-08-25 ENCOUNTER — OFFICE VISIT (OUTPATIENT)
Dept: SURGERY | Facility: CLINIC | Age: 45
End: 2020-08-25

## 2020-08-25 VITALS — WEIGHT: 246.2 LBS | BODY MASS INDEX: 41.02 KG/M2 | HEIGHT: 65 IN

## 2020-08-25 DIAGNOSIS — K80.10 CALCULUS OF GALLBLADDER WITH CHRONIC CHOLECYSTITIS WITHOUT OBSTRUCTION: Primary | ICD-10-CM

## 2020-08-25 PROCEDURE — 99243 OFF/OP CNSLTJ NEW/EST LOW 30: CPT | Performed by: SURGERY

## 2020-08-25 RX ORDER — CETIRIZINE HYDROCHLORIDE 10 MG/1
10 TABLET ORAL DAILY
COMMUNITY
End: 2021-11-10

## 2020-08-25 RX ORDER — OMEPRAZOLE 40 MG/1
40 CAPSULE, DELAYED RELEASE ORAL DAILY
COMMUNITY
Start: 2020-08-12 | End: 2021-08-10

## 2020-08-25 RX ORDER — FLUTICASONE PROPIONATE 50 MCG
2 SPRAY, SUSPENSION (ML) NASAL DAILY
COMMUNITY
End: 2021-06-20

## 2020-08-25 RX ORDER — CLINDAMYCIN PHOSPHATE 900 MG/50ML
900 INJECTION INTRAVENOUS ONCE
Status: CANCELLED | OUTPATIENT
Start: 2020-09-11 | End: 2020-08-25

## 2020-08-25 NOTE — PROGRESS NOTES
Subjective   Jessica Mota is a 45 y.o. female who presents to the office in surgical consultation from Juan Gaitan MD and Stevan Hook Jr.,*for gallstones.    History of Present Illness     The patient had a laparoscopic sleeve gastrectomy about 1 year ago and has lost 100 pounds.  Her weight loss has now leveled off and she is trying to lose more but it is more difficult.  In the past several weeks she has had increasing symptoms of GERD as well as postprandial epigastric pain.  She states that when she eats she develops pain to her upper abdomen that is crampy in nature and occurs about 30 minutes after eating.  It is often associated with nausea and vomiting.  He right upper quadrant ultrasound shows gallstones.    Review of Systems   Constitutional: Negative for fatigue and fever.   Respiratory: Negative for chest tightness and shortness of breath.    Cardiovascular: Negative for chest pain and palpitations.   Gastrointestinal: Positive for abdominal pain, constipation, nausea and vomiting. Negative for blood in stool and diarrhea.     Past Medical History:   Diagnosis Date   • Anxiety    • Asthma     EXERCISE INDUCED ASTHMA    • GERD (gastroesophageal reflux disease)    • Heartburn    • Hirsutism    • History of cellulitis    • Joint pain     GENERALIZED   • PCOS (polycystic ovarian syndrome)    • PE (pulmonary thromboembolism) (CMS/HCC) 2018   • Plantar fasciitis    • Sleep apnea     NOT CURRENTLY USING MACHINE,NEW SLEEP STUDY IN SEPT.   • Thyroid nodule    • Urinary urgency      Past Surgical History:   Procedure Laterality Date   • DILATATION AND CURETTAGE     • ECTOPIC PREGNANCY     • ENDOSCOPY N/A 4/9/2019    Procedure: ESOPHAGOGASTRODUODENOSCOPY WITH BIOPSY;  Surgeon: Stevan Hook Jr., MD;  Location: [facility];  Service: General   • ENDOSCOPY N/A 6/26/2020    Procedure: ESOPHAGOGASTRODUODENOSCOPY WITH biopsy and balloon DILATATION 18, 19, 20;  Surgeon: Stevan Hook Jr., MD;   Location: [facility] ENDOSCOPY;  Service: General;  Laterality: N/A;  pre: dysphagia and GERD  post: gastritis and esophagitis   • FOOT SURGERY     • GASTRIC SLEEVE LAPAROSCOPIC N/A 5/20/2019    Procedure: GASTRIC SLEEVE LAPAROSCOPIC AND HITAL HERNIA REPAIR;  Surgeon: Stevan Hook Jr., MD;  Location:  [facility] [place], [place];  Service: Bariatric   • HYSTERECTOMY     • SALPINGECTOMY     • TUBAL ABDOMINAL LIGATION     • WISDOM TOOTH EXTRACTION       Family History   Problem Relation Age of Onset   • Diabetes Mother    • Hypertension Mother    • Pancreatic cancer Mother    • Hypertension Father    • Malig Hyperthermia Neg Hx      Social History     Socioeconomic History   • Marital status: Single     Spouse name: Not on file   • Number of children: 2   • Years of education: Not on file   • Highest education level: Not on file   Occupational History   • Occupation: [employer]   Tobacco Use   • Smoking status: Former Smoker     Years: 15.00     Types: Cigarettes   • Smokeless tobacco: Never Used   • Tobacco comment: QUIT 2009   Substance and Sexual Activity   • Alcohol use: Yes     Alcohol/week: 2.0 standard drinks     Types: 2 Standard drinks or equivalent per week     Comment: social   • Drug use: No   • Sexual activity: Defer       Objective   Physical Exam   Constitutional: She is oriented to person, place, and time. She appears well-developed and well-nourished.  Non-toxic appearance.   Eyes: EOM are normal. No scleral icterus.   Pulmonary/Chest: Effort normal. No respiratory distress.   Abdominal: Soft. Normal appearance. There is no tenderness.   Neurological: She is alert and oriented to person, place, and time.   Skin: Skin is warm and dry.   Psychiatric: She has a normal mood and affect. Her behavior is normal. Judgment and thought content normal.       Assessment/Plan       The encounter diagnosis was Calculus of gallbladder with chronic cholecystitis without obstruction.    The patient has recurrent episodes of epigastric  abdominal pain associated with nausea and vomiting.  It is often postprandial in nature.  A right upper quadrant ultrasound shows gallstones.  She has been scheduled for a laparoscopic cholecystectomy with intraoperative cholangiogram.  The patient understands the indications, alternatives, risks, and benefits of the procedure and wishes to proceed.

## 2020-10-23 ENCOUNTER — APPOINTMENT (OUTPATIENT)
Dept: GENERAL RADIOLOGY | Facility: HOSPITAL | Age: 45
End: 2020-10-23

## 2020-10-23 ENCOUNTER — HOSPITAL ENCOUNTER (EMERGENCY)
Facility: HOSPITAL | Age: 45
Discharge: HOME | End: 2020-10-23
Admitting: EMERGENCY MEDICINE

## 2020-10-23 VITALS
RESPIRATION RATE: 18 BRPM | DIASTOLIC BLOOD PRESSURE: 82 MMHG | WEIGHT: 251.1 LBS | SYSTOLIC BLOOD PRESSURE: 130 MMHG | HEART RATE: 96 BPM | OXYGEN SATURATION: 100 % | HEIGHT: 65 IN | BODY MASS INDEX: 41.84 KG/M2 | TEMPERATURE: 97.8 F

## 2020-10-23 DIAGNOSIS — R07.9 CHEST PAIN, UNSPECIFIED TYPE: Primary | ICD-10-CM

## 2020-10-23 LAB
ALBUMIN SERPL-MCNC: 4.1 G/DL (ref 3.5–5.2)
ALBUMIN/GLOB SERPL: 1.6 G/DL
ALP SERPL-CCNC: 93 U/L (ref 39–117)
ALT SERPL W P-5'-P-CCNC: 19 U/L (ref 1–33)
ANION GAP SERPL CALCULATED.3IONS-SCNC: 11 MMOL/L (ref 5–15)
AST SERPL-CCNC: 16 U/L (ref 1–32)
BASOPHILS # BLD AUTO: 0.1 10*3/MM3 (ref 0–0.2)
BASOPHILS NFR BLD AUTO: 0.6 % (ref 0–1.5)
BILIRUB SERPL-MCNC: 0.4 MG/DL (ref 0–1.2)
BUN SERPL-MCNC: 18 MG/DL (ref 6–20)
BUN/CREAT SERPL: 22 (ref 7–25)
CALCIUM SPEC-SCNC: 9.4 MG/DL (ref 8.6–10.5)
CHLORIDE SERPL-SCNC: 104 MMOL/L (ref 98–107)
CK SERPL-CCNC: 85 U/L (ref 20–180)
CO2 SERPL-SCNC: 26 MMOL/L (ref 22–29)
CREAT SERPL-MCNC: 0.82 MG/DL (ref 0.57–1)
D DIMER PPP FEU-MCNC: 0.24 MG/L (FEU) (ref 0–0.59)
DEPRECATED RDW RBC AUTO: 41.6 FL (ref 37–54)
EOSINOPHIL # BLD AUTO: 0.2 10*3/MM3 (ref 0–0.4)
EOSINOPHIL NFR BLD AUTO: 2.1 % (ref 0.3–6.2)
ERYTHROCYTE [DISTWIDTH] IN BLOOD BY AUTOMATED COUNT: 13.6 % (ref 12.3–15.4)
GFR SERPL CREATININE-BSD FRML MDRD: 75 ML/MIN/1.73
GLOBULIN UR ELPH-MCNC: 2.5 GM/DL
GLUCOSE SERPL-MCNC: 86 MG/DL (ref 65–99)
HCT VFR BLD AUTO: 39.6 % (ref 34–46.6)
HGB BLD-MCNC: 13.5 G/DL (ref 12–15.9)
HOLD SPECIMEN: NORMAL
INR PPP: 0.95 (ref 0.93–1.1)
LYMPHOCYTES # BLD AUTO: 2.9 10*3/MM3 (ref 0.7–3.1)
LYMPHOCYTES NFR BLD AUTO: 28.1 % (ref 19.6–45.3)
MAGNESIUM SERPL-MCNC: 2 MG/DL (ref 1.6–2.6)
MCH RBC QN AUTO: 29.7 PG (ref 26.6–33)
MCHC RBC AUTO-ENTMCNC: 34 G/DL (ref 31.5–35.7)
MCV RBC AUTO: 87.1 FL (ref 79–97)
MONOCYTES # BLD AUTO: 0.5 10*3/MM3 (ref 0.1–0.9)
MONOCYTES NFR BLD AUTO: 4.8 % (ref 5–12)
NEUTROPHILS NFR BLD AUTO: 6.5 10*3/MM3 (ref 1.7–7)
NEUTROPHILS NFR BLD AUTO: 64.4 % (ref 42.7–76)
NRBC BLD AUTO-RTO: 0 /100 WBC (ref 0–0.2)
PLATELET # BLD AUTO: 265 10*3/MM3 (ref 140–450)
PMV BLD AUTO: 6.8 FL (ref 6–12)
POTASSIUM SERPL-SCNC: 3.5 MMOL/L (ref 3.5–5.2)
PROT SERPL-MCNC: 6.6 G/DL (ref 6–8.5)
PROTHROMBIN TIME: 10.5 SECONDS (ref 9.6–11.7)
RBC # BLD AUTO: 4.55 10*6/MM3 (ref 3.77–5.28)
SODIUM SERPL-SCNC: 141 MMOL/L (ref 136–145)
TROPONIN T SERPL-MCNC: <0.01 NG/ML (ref 0–0.03)
TSH SERPL DL<=0.05 MIU/L-ACNC: 1.1 UIU/ML (ref 0.27–4.2)
WBC # BLD AUTO: 10.1 10*3/MM3 (ref 3.4–10.8)
WHOLE BLOOD HOLD SPECIMEN: NORMAL
WHOLE BLOOD HOLD SPECIMEN: NORMAL

## 2020-10-23 PROCEDURE — 85610 PROTHROMBIN TIME: CPT | Performed by: PHYSICIAN ASSISTANT

## 2020-10-23 PROCEDURE — 85379 FIBRIN DEGRADATION QUANT: CPT | Performed by: PHYSICIAN ASSISTANT

## 2020-10-23 PROCEDURE — 84484 ASSAY OF TROPONIN QUANT: CPT | Performed by: PHYSICIAN ASSISTANT

## 2020-10-23 PROCEDURE — 84443 ASSAY THYROID STIM HORMONE: CPT | Performed by: PHYSICIAN ASSISTANT

## 2020-10-23 PROCEDURE — 82550 ASSAY OF CK (CPK): CPT | Performed by: PHYSICIAN ASSISTANT

## 2020-10-23 PROCEDURE — 71045 X-RAY EXAM CHEST 1 VIEW: CPT

## 2020-10-23 PROCEDURE — 83735 ASSAY OF MAGNESIUM: CPT | Performed by: PHYSICIAN ASSISTANT

## 2020-10-23 PROCEDURE — 99283 EMERGENCY DEPT VISIT LOW MDM: CPT

## 2020-10-23 PROCEDURE — 85025 COMPLETE CBC W/AUTO DIFF WBC: CPT | Performed by: PHYSICIAN ASSISTANT

## 2020-10-23 PROCEDURE — 80053 COMPREHEN METABOLIC PANEL: CPT | Performed by: PHYSICIAN ASSISTANT

## 2020-10-23 PROCEDURE — 93005 ELECTROCARDIOGRAM TRACING: CPT

## 2020-10-23 RX ORDER — SODIUM CHLORIDE 0.9 % (FLUSH) 0.9 %
10 SYRINGE (ML) INJECTION AS NEEDED
Status: DISCONTINUED | OUTPATIENT
Start: 2020-10-23 | End: 2020-10-23 | Stop reason: HOSPADM

## 2020-10-23 RX ADMIN — SODIUM CHLORIDE 500 ML: 9 INJECTION, SOLUTION INTRAVENOUS at 16:08

## 2020-10-23 RX ADMIN — LIDOCAINE HYDROCHLORIDE: 20 SOLUTION ORAL; TOPICAL at 19:51

## 2020-10-23 NOTE — ED PROVIDER NOTES
Subjective   Chief Complaint: Multiple complaints    Patient is a 45 year old  female history of anxiety, asthma, GERD, PCOS presents to the ER with chief complaint of chest pain for 3 days.  Patient has multiple vague complaints, states 3 days ago she started having some sternal chest pain, headache, fatigue and body aches.  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  Patient states that she feels sore every day but states that it has been slightly worse today.  Patient reports midsternal chest pain that does not radiate but no chest pain today.  Patient states pain feels like a pressure and burning pain that she rates 5/10 when it occurs.  She denies any associated shortness of breath, cough or sore throat.  Patient reports some headache, no dizziness lightheadedness or blurry vision.  Patient denies any abdominal pain, nausea vomiting or diarrhea.  Patient denies any numbness or tingling in her extremities.  Patient denies any loss of taste or smell.  Patient denies any fever or chills.    Location: Chest    Quality: Pressure, burning    Duration: 3 days    Timing: Intermittent    Severity: Mild to moderate    Associated Symptoms: Headache, body aches, fatigue    PCP: Hernan Gaitan      History provided by:  Patient      Review of Systems   Constitutional: Positive for fatigue. Negative for chills and fever.   HENT: Negative for congestion, sore throat and trouble swallowing.    Eyes: Negative for visual disturbance.   Respiratory: Negative for cough, chest tightness, shortness of breath and wheezing.    Cardiovascular: Positive for chest pain. Negative for palpitations and leg swelling.   Gastrointestinal: Positive for nausea. Negative for abdominal pain, diarrhea and vomiting.   Genitourinary: Negative for dysuria.   Musculoskeletal: Positive for myalgias.   Skin: Negative for rash.   Neurological:  Positive for weakness and headaches. Negative for dizziness.   Psychiatric/Behavioral: Negative for behavioral problems.   All other systems reviewed and are negative.      Past Medical History:   Diagnosis Date   • Anxiety    • Asthma     EXERCISE INDUCED ASTHMA    • GERD (gastroesophageal reflux disease)    • Heartburn    • Hirsutism    • History of cellulitis    • Joint pain     GENERALIZED   • PCOS (polycystic ovarian syndrome)    • PE (pulmonary thromboembolism) (CMS/[facility]) 2018   • Plantar fasciitis    • Sleep apnea     NOT CURRENTLY USING MACHINE,NEW SLEEP STUDY IN SEPT.   • Thyroid nodule    • Urinary urgency        Allergies   Allergen Reactions   • Levofloxacin Nausea Only   • Penicillins Rash       Past Surgical History:   Procedure Laterality Date   • DILATATION AND CURETTAGE     • ECTOPIC PREGNANCY     • ENDOSCOPY N/A 4/9/2019    Procedure: ESOPHAGOGASTRODUODENOSCOPY WITH BIOPSY;  Surgeon: Stevan Hook Jr., MD;  Location: [facility] ENDOSCOPY;  Service: General   • ENDOSCOPY N/A 6/26/2020    Procedure: ESOPHAGOGASTRODUODENOSCOPY WITH biopsy and balloon DILATATION 18, 19, 20;  Surgeon: Stevan Hook Jr., MD;  Location: [facility] ENDOSCOPY;  Service: General;  Laterality: N/A;  pre: dysphagia and GERD  post: gastritis and esophagitis   • FOOT SURGERY     • GASTRIC SLEEVE LAPAROSCOPIC N/A 5/20/2019    Procedure: GASTRIC SLEEVE LAPAROSCOPIC AND HITAL HERNIA REPAIR;  Surgeon: Stevan Hook Jr., MD;  Location: [facility] OR [facility];  Service: Bariatric   • HYSTERECTOMY     • SALPINGECTOMY     • TUBAL ABDOMINAL LIGATION     • WISDOM TOOTH EXTRACTION         Family History   Problem Relation Age of Onset   • Diabetes Mother    • Hypertension Mother    • Pancreatic cancer Mother    • Hypertension Father    • Malig Hyperthermia Neg Hx        Social History     Socioeconomic History   • Marital status: Single     Spouse name: Not on file   • Number of children: 2   • Years of education: Not on file   • Highest  education level: Not on file   Occupational History   • Occupation:    Tobacco Use   • Smoking status: Former Smoker     Years: 15.00     Types: Cigarettes   • Smokeless tobacco: Never Used   • Tobacco comment: QUIT 2009   Substance and Sexual Activity   • Alcohol use: Yes     Alcohol/week: 2.0 standard drinks     Types: 2 Standard drinks or equivalent per week     Comment: social   • Drug use: No   • Sexual activity: Defer           Objective   Physical Exam  Vitals signs and nursing note reviewed.   Constitutional:       Appearance: Normal appearance. She is well-developed. She is obese. She is not ill-appearing, toxic-appearing or diaphoretic.   HENT:      Head: Normocephalic and atraumatic.   Eyes:      Pupils: Pupils are equal, round, and reactive to light.   Cardiovascular:      Rate and Rhythm: Normal rate and regular rhythm.      Pulses:           Radial pulses are 2+ on the right side and 2+ on the left side.        Dorsalis pedis pulses are 2+ on the right side and 2+ on the left side.      Heart sounds: Normal heart sounds. No murmur.   Pulmonary:      Effort: Pulmonary effort is normal. No respiratory distress.      Breath sounds: Normal breath sounds. No wheezing or rhonchi.   Chest:      Chest wall: No tenderness or crepitus.   Abdominal:      General: Bowel sounds are normal. There is no distension.      Palpations: Abdomen is soft.      Tenderness: There is no abdominal tenderness.   Musculoskeletal: Normal range of motion.      Right lower leg: She exhibits no tenderness. No edema.      Left lower leg: She exhibits no tenderness. No edema (.vs).   Skin:     General: Skin is warm and dry.      Capillary Refill: Capillary refill takes less than 2 seconds.      Findings: No erythema or rash.   Neurological:      General: No focal deficit present.      Mental Status: She is alert and oriented to person, place, and time.   Psychiatric:         Mood and Affect: Mood normal.         Behavior: Behavior  normal.         ECG 12 Lead      Date/Time: 10/23/2020 4:07 PM  Performed by: Julianna Webb PA  Authorized by: Julianna Webb PA   Interpreted by physician (Paul Blount)  Previous ECG: no previous ECG available  Rhythm: sinus rhythm  Rate: normal  BPM: 45  QRS axis: normal  Conduction: conduction normal  ST Segments: ST segments normal  T Waves: T waves normal  Other: no other findings  Clinical impression: normal ECG                 ED Course  ED Course as of Oct 24 0103   Fri Oct 23, 2020   1935 Patient reevaluated, she is sitting up on the side of the bed, no acute distress, patient states he feels anxious, does not know was going on.  Patient reports some burning in her chest.  Patient was notified of her imaging and lab work results.  Patient states that she felt better.  Patient states that she still has some burning in her chest will try GI cocktail prior to disposition    [MM]    Patient states that she has had stress test in the last year prior to her gastric sleeve surgery last year.  Patient states she had this done at [place]    [MM]    ARDIOLOGY REPORT  [facility]AC[facility]    PATIENT NAME/: GEORGIE CORRALES    1975  UNIT/AGE/GENDER:      AGE: 44 YR        GENDER: F  UNIT NUMBER: [id number]  ACCOUNT NUMBER: [id number]  ACCESSION NUMBER: [id number]    DATE OF EXAM: 2019  08:29  EXAMINATION(S): ECHO DOPPLER 2D MMODE SPECT COLOR COMPLETE    FINAL REPORT    Procedure  Type of Study     TTE procedure:ECHO DOPPLER 2D MMODE SPECT COLOR COMPLETE.    Technical Quality: Limited visualization due to body habitus.    Clinical Indications:Chest pain.    Height: 165 cmWeight: 145.2 kgBSA: 2.41 m^2 BMI: 53.32 kg/m^2    BP: 124/63 mmHg     Conclusions     Summary   Technically difficult study   Overall left ventricular function is normal.   The ejection fraction biplane was calculated at 55%.   Moderate concentric left ventricular hypertrophy.   No  "significant valvular abnormality identified     Any valve disease noted in the report is non-rheumatic unless otherwise   specifically noted.    [MM]   1953 Notified by ER RN patient had relief with GI cocktail.  Patient is requesting discharge home    [MM]      ED Course User Index  [MM] Julianna Webb PA    /82 (BP Location: Left arm, Patient Position: Sitting)   Pulse 96   Temp 97.8 °F (36.6 °C) (Oral)   Resp 18   Ht 165.1 cm (65\")   Wt 114 kg (251 lb 1.7 oz)   SpO2 100%   BMI 41.79 kg/m²   Labs Reviewed   CBC WITH AUTO DIFFERENTIAL - Abnormal; Notable for the following components:       Result Value    Monocyte % 4.8 (*)     All other components within normal limits   TROPONIN (IN-HOUSE) - Normal    Narrative:     Troponin T Reference Range:  <= 0.03 ng/mL-   Negative for AMI  >0.03 ng/mL-     Abnormal for myocardial necrosis.  Clinicians would have to utilize clinical acumen, EKG, Troponin and serial changes to determine if it is an Acute Myocardial Infarction or myocardial injury due to an underlying chronic condition.       Results may be falsely decreased if patient taking Biotin.     PROTIME-INR - Normal   CK - Normal   TSH - Normal   MAGNESIUM - Normal   D-DIMER, QUANTITATIVE - Normal    Narrative:     Reference Range  --------------------------------------------------------------------     < 0.50   Negative Predictive Value  0.50-0.59   Indeterminate    >= 0.60   Probable VTE             A very low percentage of patients with DVT may yield D-Dimer results   below the cut-off of 0.50 mg/L FEU.  This is known to be more   prevalent in patients with distal DVT.             Results of this test should always be interpreted in conjunction with   the patient's medical history, clinical presentation and other   findings.  Clinical diagnosis should not be based on the result of   INNOVANCE D-Dimer alone.   RAINBOW DRAW    Narrative:     The following orders were created for panel order [place] " Draw.  Procedure                               Abnormality         Status                     ---------                               -----------         ------                     Light Blue Top[[id number]]                                   Final result               Green Top (Gel)[[id number]]                                  Final result               Lavender Top[[id number]]                                     Final result               Gold Top - SST[[id number]]                                   Final result                 Please view results for these tests on the individual orders.   COMPREHENSIVE METABOLIC PANEL    Narrative:     GFR Normal >60  Chronic Kidney Disease <60  Kidney Failure <15     CBC AND DIFFERENTIAL    Narrative:     The following orders were created for panel order CBC & Differential.  Procedure                               Abnormality         Status                     ---------                               -----------         ------                     CBC Auto Differential[[id number]]        Abnormal            Final result                 Please view results for these tests on the individual orders.   LIGHT BLUE TOP   GREEN TOP   LAVENDER TOP   GOLD TOP - SST   EXTRA TUBES    Narrative:     The following orders were created for panel order Extra Tubes.  Procedure                               Abnormality         Status                     ---------                               -----------         ------                     Green Top (Gel)[[id number]]                                  Final result                 Please view results for these tests on the individual orders.   GREEN TOP     Medications   sodium chloride 0.9 % bolus 500 mL (0 mL Intravenous Stopped 10/23/20 2002)   GI cocktail ( Oral Given 10/23/20 1951)     Xr Chest 1 View    Result Date: 10/23/2020  1.Mildly enlarged cardiac silhouette. 2.No definite acute pulmonary abnormality.  Electronically Signed By-Sada Perkins  On:10/23/2020 4:42 PM This report was finalized on [id number] by  Sada Perkins, .                                           MDM  Number of Diagnoses or Management Options  Chest pain, unspecified type:   Diagnosis management comments: MEDICAL DECISION  Epic Chart Review: Patient last hospitalization 5/21/2019 for gastric sleeve surgery  Comorbidities: Anxiety, PCOS, history of PE  Differentials: PE, pneumonia, ACS, MI; this list is not all inclusive and does not constitute the entirety of considered causes  Radiology interpretation:  Images reviewed by me and interpreted by radiologist,   Chest x-ray shows mild enlarged cardiac silhouette, no definite acute pulmonary abnormality  Lab interpretation:  Labs viewed by me significant for, CMP within normal limits.  Troponin normal less than 0.01.  PT/INR normal.  CK normal 85.  TSH of 1.1.  Magnesium normal 2.0.  CBC within normal limits.  D-dimer normal less than 0.24.  EKG interpretation: Normal sinus rhythm with a rate of 72, no acute ST changes.    While in the ED IV was placed and labs were obtained appropriate PPE was worn during exam and throughout all encounters with the patient.  Throughout the evaluation.  Physical exam is unremarkable.  Lab work is also unremarkable.  Patient vital signs were stable throughout her ER stay.  Patient additionally given aspirin, reported no chest pain while in the ER.  Patient became very anxious, stating she felt burning pain in her upper chest and did not know what is going on.  Explained lab work and imaging results with patient at bedside.  Patient was given GI cocktail.  Patient states that her symptoms nearly completely subsided after drinking GI cocktail.  Chest pain most likely related to possible gastritis, GERD or anxiety.  Patient states that she is supposed to be taking omeprazole daily but states she has not taken this in 1 month.  Patient had stress test and echo done 1 year ago prior to her gastric sleeve  surgery which were within normal limits, ejection fraction 55%.  Patient patient HPI, exam and normal lab work I feel that patient symptoms is not cardiac in nature.  Patient heart score is low.  Patient vital signs remained stable, patient denies any pain or discomfort at this time, is requesting discharge.  Patient be discharged advised to follow-up with primary care for further management evaluation.    Discharge plan and instructions were discussed with the patient who verbalized understanding and is in agreement with the plan, all questions were answered at this time.  Patient is aware of signs symptoms that would require immediate return to the emergency room.  Patient understands importance of following up with primary care provider for further evaluation and worsening concerns as well as blood pressure recheck in the next 4 weeks.    Patient remained afebrile, nontoxic-appearing, no acute respiratory distress throughout entire emergency room stay.  Patient was discharged in improved stable condition with an upright steady gait.         Amount and/or Complexity of Data Reviewed  Clinical lab tests: reviewed and ordered  Tests in the radiology section of CPT®: reviewed and ordered  Tests in the medicine section of CPT®: reviewed    Patient Progress  Patient progress: stable      Final diagnoses:   Chest pain, unspecified type            Julianna Webb PA  10/24/20 0103

## 2020-10-23 NOTE — ED NOTES
Pt requesting to leave AMA per [facility] [place]. PT notified that nursing staff will be in to see pt ASAP.       Анна Nye, RN  10/23/20 1904       Анна Nye, RN  10/23/20 1905       Анна Nye, RN  10/23/20 1905

## 2020-10-23 NOTE — ED NOTES
Pt reports she has had increased fatigue and chest discomfort for the past several days. Pt denies any cough, fever, SOA.      Анна Nye, RN  10/23/20 6743

## 2020-10-23 NOTE — DISCHARGE INSTRUCTIONS
Continue take medications at home as prescribed  Take your omeprazole daily as needed for heartburn or burning chest.    Follow-up with primary care as needed for new or worsening concerns.  Return to the ER for new or worsening symptoms.

## 2020-12-22 ENCOUNTER — TELEMEDICINE (OUTPATIENT)
Dept: BARIATRICS/WEIGHT MGMT | Facility: CLINIC | Age: 45
End: 2020-12-22

## 2020-12-22 DIAGNOSIS — Z98.84 S/P LAPAROSCOPIC SLEEVE GASTRECTOMY: ICD-10-CM

## 2020-12-22 DIAGNOSIS — E66.9 OBESITY, CLASS II, BMI 35-39.9: ICD-10-CM

## 2020-12-22 DIAGNOSIS — Z71.3 DIETARY COUNSELING: ICD-10-CM

## 2020-12-22 DIAGNOSIS — E66.01 OBESITY, CLASS III, BMI 40-49.9 (MORBID OBESITY) (HCC): Primary | ICD-10-CM

## 2020-12-22 DIAGNOSIS — K21.9 GASTROESOPHAGEAL REFLUX DISEASE, UNSPECIFIED WHETHER ESOPHAGITIS PRESENT: ICD-10-CM

## 2020-12-22 DIAGNOSIS — K80.10 CALCULUS OF GALLBLADDER WITH CHRONIC CHOLECYSTITIS WITHOUT OBSTRUCTION: ICD-10-CM

## 2020-12-22 PROCEDURE — 99214 OFFICE O/P EST MOD 30 MIN: CPT | Performed by: SURGERY

## 2020-12-22 NOTE — PROGRESS NOTES
[facility]  [address] [facility] [address]  [facility] [id number] [phone]  [address] [id number] [phone]  Dept: [phone]  12/22/2020      Jessica Mota.  [id number]  [id number]  1975  female      No chief complaint on file.    You have chosen to receive care through a telehealth visit.  Do you consent to use a video/audio connection for your medical care today? Yes    BH Post-Op Bariatric Surgery:   Jessica Mota is status post Laparoscopic Sleeve/HH procedure, performed on 5/20/19     HPI:   Today's weight is   pounds, today's BMI is There is no height or weight on file to calculate BMI.,@ has a  gain of 5 pounds since the last visit and@ weight loss since surgery is 95 pounds. The patient reports a decreased portion size and loss of appetite.      Jessica Mota denies nausea vomiting fever chills chest pain shortness of air or abdominal pain and reports feeling much better.  Patient underwent ultrasound revealing gallstones.  She saw Dr. Callahan but feeling better since she changed her diet and not ready to have gallbladder taken out at this point.  She went through her daily routine and is been eating small frequent meals secondary to her low sugars at time.     Diet and Exercise: Diet history reviewed and discussed with the patient. Weight loss/gains to date discussed with the patient. The patient states they are eating 70 grams of protein per day. She reports eating 5 meals per day, a typical portion size of 1 cup, eating 2 snacks per day, drinking 3 or more 8-oz. glasses of water per day, no carbonated beverage consumption and exercising regularly.     Supplements: Per sleeve.     Review of Systems   Constitutional: Positive for fatigue.   Gastrointestinal: Positive for constipation.   Musculoskeletal: Positive for arthralgias.   All other systems reviewed and are negative.      Patient Active Problem List    Diagnosis   • SHANDA (obstructive sleep apnea)   • Vitamin D insufficiency   • Thyroid nodule   • Pulmonary embolism (CMS/HCC)   • Depression with anxiety   • Edema   • Dietary counseling   • Labile blood pressure   • Multiple joint pain   • Prediabetes   • Obesity, Class II, BMI 35-39.9   • S/P laparoscopic sleeve gastrectomy   • Epigastric discomfort   • Esophageal reflux   • Other dysphagia   • Gastroesophageal reflux disease   • Calculus of gallbladder with chronic cholecystitis without obstruction       Past Medical History:   Diagnosis Date   • Anxiety    • Asthma     EXERCISE INDUCED ASTHMA    • GERD (gastroesophageal reflux disease)    • Heartburn    • Hirsutism    • History of cellulitis    • Joint pain     GENERALIZED   • PCOS (polycystic ovarian syndrome)    • PE (pulmonary thromboembolism) (CMS/HCC) 2018   • Plantar fasciitis    • Sleep apnea     NOT CURRENTLY USING MACHINE,NEW SLEEP STUDY IN SEPT.   • Thyroid nodule    • Urinary urgency        The following portions of the patient's history were reviewed and updated as appropriate: allergies, current medications, past family history, past medical history, past social history, past surgical history and problem list.    There were no vitals filed for this visit.    Physical Exam  Constitutional:       Appearance: Normal appearance.   Eyes:      Conjunctiva/sclera: Conjunctivae normal.   Pulmonary:      Effort: Pulmonary effort is normal.   Neurological:      Mental Status: She is alert.   Psychiatric:         Mood and Affect: Mood normal.         Behavior: Behavior normal.         Thought Content: Thought content normal.         Judgment: Judgment normal.         Assessment:   Post-op, the patient status post laparoscopic sleeve gastrectomy and hiatal hernia repair May 2019 status post dilatation of her pylorus back in June.  Patient doing much better without any dysphagia in heartburn is controlled.  Patient does have gallstones but presently  asymptomatic.  She will follow up with Dr. Callahan if any of these symptoms return.  I instructed her to add strength training with her exercise routine.  She also would like to do the new replacement shakes for a couple weeks to get a jump in her weight loss.  She would like to lose below 200 pounds by next visit at her annual follow-up in May.    Encounter Diagnoses   Name Primary?   • Obesity, Class III, BMI 40-49.9 (morbid obesity) (CMS/[facility]) Yes   • Dietary counseling    • Obesity, Class II, BMI 35-39.9    • S/P laparoscopic sleeve gastrectomy    • Calculus of gallbladder with chronic cholecystitis without obstruction    • Gastroesophageal reflux disease, unspecified whether esophagitis present        Plan:     Encouraged patient to be sure to get plenty of lean protein per day through small frequent meals all with a protein source.   Activity restrictions: none.   Recommended patient be sure to get at least 70 grams of protein per day by eating small, frequent meals all with high lean protein choices. Be sure to limit/cut back on daily carbohydrate intake. Discussed with the patient the recommended amount of water per day to intake- half of body weight in ounces. Reviewed vitamin requirements. Be sure to do especially with strength training which she will do., 150 minutes per week minimum, including both cardio and strength training.     Instructions / Recommendations: dietary counseling recommended, recommended a daily protein intake of  grams, vitamin supplement(s) recommended, recommended exercising at least 150 minutes per week, behavior modifications recommended and instructed to call the office for concerns, questions, or problems.     The patient was instructed to follow up in 6 months.     The patient was counseled regarding. Total time spent face to face was 25 minutes and 20 minutes was spent counseling.

## 2021-02-08 ENCOUNTER — TELEPHONE (OUTPATIENT)
Dept: BARIATRICS/WEIGHT MGMT | Facility: CLINIC | Age: 46
End: 2021-02-08

## 2021-02-08 NOTE — TELEPHONE ENCOUNTER
Pt had GS on 5/20/19 Pt called stating she has been dizzy/ weak for the past two days. Pt states her stomach has been upset as well with oily stools. Pt states she is going to the ER to [facility]. Pt states she is not sure if she has a virus or something. Pt states she would keep us updated.

## 2021-06-20 ENCOUNTER — HOSPITAL ENCOUNTER (EMERGENCY)
Facility: HOSPITAL | Age: 46
Discharge: HOME | End: 2021-06-20
Attending: EMERGENCY MEDICINE | Admitting: EMERGENCY MEDICINE

## 2021-06-20 ENCOUNTER — APPOINTMENT (OUTPATIENT)
Dept: CT IMAGING | Facility: HOSPITAL | Age: 46
End: 2021-06-20

## 2021-06-20 VITALS
HEART RATE: 82 BPM | BODY MASS INDEX: 39.15 KG/M2 | TEMPERATURE: 98.8 F | DIASTOLIC BLOOD PRESSURE: 85 MMHG | HEIGHT: 65 IN | SYSTOLIC BLOOD PRESSURE: 124 MMHG | RESPIRATION RATE: 17 BRPM | OXYGEN SATURATION: 99 % | WEIGHT: 235 LBS

## 2021-06-20 DIAGNOSIS — R10.84 GENERALIZED ABDOMINAL PAIN: ICD-10-CM

## 2021-06-20 DIAGNOSIS — K80.50 BILIARY COLIC: Primary | ICD-10-CM

## 2021-06-20 LAB
ALBUMIN SERPL-MCNC: 4.1 G/DL (ref 3.5–5.2)
ALBUMIN/GLOB SERPL: 1.7 G/DL
ALP SERPL-CCNC: 77 U/L (ref 39–117)
ALT SERPL W P-5'-P-CCNC: 14 U/L (ref 1–33)
ANION GAP SERPL CALCULATED.3IONS-SCNC: 8.3 MMOL/L (ref 5–15)
AST SERPL-CCNC: 12 U/L (ref 1–32)
BACTERIA UR QL AUTO: ABNORMAL /HPF
BASOPHILS # BLD AUTO: 0.03 10*3/MM3 (ref 0–0.2)
BASOPHILS NFR BLD AUTO: 0.3 % (ref 0–1.5)
BILIRUB SERPL-MCNC: 0.5 MG/DL (ref 0–1.2)
BILIRUB UR QL STRIP: NEGATIVE
BUN SERPL-MCNC: 16 MG/DL (ref 6–20)
BUN/CREAT SERPL: 16.7 (ref 7–25)
CALCIUM SPEC-SCNC: 8.8 MG/DL (ref 8.6–10.5)
CHLORIDE SERPL-SCNC: 109 MMOL/L (ref 98–107)
CLARITY UR: ABNORMAL
CO2 SERPL-SCNC: 24.7 MMOL/L (ref 22–29)
COLOR UR: ABNORMAL
CREAT SERPL-MCNC: 0.96 MG/DL (ref 0.57–1)
DEPRECATED RDW RBC AUTO: 42 FL (ref 37–54)
EOSINOPHIL # BLD AUTO: 0.22 10*3/MM3 (ref 0–0.4)
EOSINOPHIL NFR BLD AUTO: 2.3 % (ref 0.3–6.2)
ERYTHROCYTE [DISTWIDTH] IN BLOOD BY AUTOMATED COUNT: 13.3 % (ref 12.3–15.4)
GFR SERPL CREATININE-BSD FRML MDRD: 63 ML/MIN/1.73
GLOBULIN UR ELPH-MCNC: 2.4 GM/DL
GLUCOSE SERPL-MCNC: 86 MG/DL (ref 65–99)
GLUCOSE UR STRIP-MCNC: NEGATIVE MG/DL
HCT VFR BLD AUTO: 37.6 % (ref 34–46.6)
HGB BLD-MCNC: 13 G/DL (ref 12–15.9)
HGB UR QL STRIP.AUTO: ABNORMAL
HOLD SPECIMEN: NORMAL
HOLD SPECIMEN: NORMAL
HYALINE CASTS UR QL AUTO: ABNORMAL /LPF
IMM GRANULOCYTES # BLD AUTO: 0.01 10*3/MM3 (ref 0–0.05)
IMM GRANULOCYTES NFR BLD AUTO: 0.1 % (ref 0–0.5)
KETONES UR QL STRIP: ABNORMAL
LEUKOCYTE ESTERASE UR QL STRIP.AUTO: NEGATIVE
LIPASE SERPL-CCNC: 27 U/L (ref 13–60)
LYMPHOCYTES # BLD AUTO: 2.47 10*3/MM3 (ref 0.7–3.1)
LYMPHOCYTES NFR BLD AUTO: 26 % (ref 19.6–45.3)
MCH RBC QN AUTO: 30.3 PG (ref 26.6–33)
MCHC RBC AUTO-ENTMCNC: 34.6 G/DL (ref 31.5–35.7)
MCV RBC AUTO: 87.6 FL (ref 79–97)
MONOCYTES # BLD AUTO: 0.52 10*3/MM3 (ref 0.1–0.9)
MONOCYTES NFR BLD AUTO: 5.5 % (ref 5–12)
NEUTROPHILS NFR BLD AUTO: 6.24 10*3/MM3 (ref 1.7–7)
NEUTROPHILS NFR BLD AUTO: 65.8 % (ref 42.7–76)
NITRITE UR QL STRIP: NEGATIVE
NRBC BLD AUTO-RTO: 0 /100 WBC (ref 0–0.2)
PH UR STRIP.AUTO: 5.5 [PH] (ref 5–8)
PLATELET # BLD AUTO: 241 10*3/MM3 (ref 140–450)
PMV BLD AUTO: 8.6 FL (ref 6–12)
POTASSIUM SERPL-SCNC: 3.3 MMOL/L (ref 3.5–5.2)
PROT SERPL-MCNC: 6.5 G/DL (ref 6–8.5)
PROT UR QL STRIP: ABNORMAL
RBC # BLD AUTO: 4.29 10*6/MM3 (ref 3.77–5.28)
RBC # UR: ABNORMAL /HPF
REF LAB TEST METHOD: ABNORMAL
SODIUM SERPL-SCNC: 142 MMOL/L (ref 136–145)
SP GR UR STRIP: 1.03 (ref 1–1.03)
SQUAMOUS #/AREA URNS HPF: ABNORMAL /HPF
UROBILINOGEN UR QL STRIP: ABNORMAL
WBC # BLD AUTO: 9.49 10*3/MM3 (ref 3.4–10.8)
WBC UR QL AUTO: ABNORMAL /HPF
WHOLE BLOOD HOLD SPECIMEN: NORMAL

## 2021-06-20 PROCEDURE — 99283 EMERGENCY DEPT VISIT LOW MDM: CPT

## 2021-06-20 PROCEDURE — 81001 URINALYSIS AUTO W/SCOPE: CPT | Performed by: EMERGENCY MEDICINE

## 2021-06-20 PROCEDURE — 80053 COMPREHEN METABOLIC PANEL: CPT | Performed by: EMERGENCY MEDICINE

## 2021-06-20 PROCEDURE — 74177 CT ABD & PELVIS W/CONTRAST: CPT

## 2021-06-20 PROCEDURE — 25010000002 IOPAMIDOL 61 % SOLUTION: Performed by: EMERGENCY MEDICINE

## 2021-06-20 PROCEDURE — 85025 COMPLETE CBC W/AUTO DIFF WBC: CPT | Performed by: EMERGENCY MEDICINE

## 2021-06-20 PROCEDURE — 83690 ASSAY OF LIPASE: CPT | Performed by: EMERGENCY MEDICINE

## 2021-06-20 RX ORDER — HYDROCODONE BITARTRATE AND ACETAMINOPHEN 10; 325 MG/1; MG/1
1 TABLET ORAL EVERY 4 HOURS PRN
Qty: 10 TABLET | Refills: 0 | Status: SHIPPED | OUTPATIENT
Start: 2021-06-20 | End: 2021-11-10

## 2021-06-20 RX ORDER — ONDANSETRON 2 MG/ML
4 INJECTION INTRAMUSCULAR; INTRAVENOUS ONCE
Status: DISCONTINUED | OUTPATIENT
Start: 2021-06-20 | End: 2021-06-20 | Stop reason: HOSPADM

## 2021-06-20 RX ORDER — HYDROMORPHONE HYDROCHLORIDE 1 MG/ML
0.5 INJECTION, SOLUTION INTRAMUSCULAR; INTRAVENOUS; SUBCUTANEOUS ONCE
Status: DISCONTINUED | OUTPATIENT
Start: 2021-06-20 | End: 2021-06-20 | Stop reason: HOSPADM

## 2021-06-20 RX ORDER — SODIUM CHLORIDE 0.9 % (FLUSH) 0.9 %
10 SYRINGE (ML) INJECTION AS NEEDED
Status: DISCONTINUED | OUTPATIENT
Start: 2021-06-20 | End: 2021-06-20 | Stop reason: HOSPADM

## 2021-06-20 RX ORDER — FOLIC ACID 0.8 MG
250 TABLET ORAL NIGHTLY
COMMUNITY
End: 2021-11-10

## 2021-06-20 RX ORDER — ONDANSETRON 4 MG/1
4 TABLET, FILM COATED ORAL EVERY 6 HOURS PRN
Qty: 10 TABLET | Refills: 0 | Status: SHIPPED | OUTPATIENT
Start: 2021-06-20 | End: 2021-11-10

## 2021-06-20 RX ADMIN — IOPAMIDOL 85 ML: 612 INJECTION, SOLUTION INTRAVENOUS at 19:36

## 2021-06-20 RX ADMIN — SODIUM CHLORIDE 1000 ML: 9 INJECTION, SOLUTION INTRAVENOUS at 19:56

## 2021-06-20 NOTE — ED TRIAGE NOTES
Pt complains of right sided abdominal pain and left sided flank pain worsening over the past 4 days. Pt states she has a hx of gall stones and kidney stones. Pt and RN wearing mask upon triage.

## 2021-06-20 NOTE — ED NOTES
Patient was placed in face mask in first look. Patient was wearing facemask when I entered the room and throughout our encounter. I wore full protective equipment throughout this patient encounter including a face mask, and gloves. Hand hygiene was performed before donning protective equipment and after removal when leaving the room.       Demond Steen RN  06/20/21 1934

## 2021-06-20 NOTE — ED PROVIDER NOTES
EMERGENCY DEPARTMENT ENCOUNTER    Room Number:  23/23  Date of encounter:  6/21/2021  PCP: Juan Gaitan MD  Historian: Patient      HPI:  Chief Complaint: Back pain/abdominal pain      Context: Jessica Mota is a 46 y.o. female who presents to the ED c/o 4 days of initially left-sided back pain that is progressed across her lower back with nausea and vomiting bile.  The patient states she has a history of kidney stones and gallstones and was told by Dr. Callahan she needed to have her gallbladder out previously but states she did not have the money for it.  She also has had gastric sleeve surgery by Dr. Hook.  The patient denies diarrhea, fevers, chills, chest pain, shortness of breath, cough, known COVID-19 exposure, headache, lower extremity pain, lower extremity swelling or focal neuro deficit      PAST MEDICAL HISTORY  Active Ambulatory Problems     Diagnosis Date Noted   • SHANDA (obstructive sleep apnea) 12/11/2018   • Vitamin D insufficiency 11/24/2014   • Thyroid nodule 12/11/2018   • Pulmonary embolism (CMS/[facility]) 06/07/2018   • Depression with anxiety 12/11/2018   • Edema 12/11/2018   • Dietary counseling 12/11/2018   • Labile blood pressure 12/11/2018   • Multiple joint pain 12/11/2018   • Prediabetes 12/11/2018   • Obesity, Class II, BMI 35-39.9 05/06/2019   • S/P laparoscopic sleeve gastrectomy 08/21/2019   • Epigastric discomfort 03/19/2020   • Esophageal reflux 03/19/2020   • Other dysphagia 06/11/2020   • Gastroesophageal reflux disease 06/12/2020   • Calculus of gallbladder with chronic cholecystitis without obstruction 08/25/2020     Resolved Ambulatory Problems     Diagnosis Date Noted   • Morbid obesity with BMI of 50.0-59.9, adult (CMS/[facility]) 11/20/2014   • Heartburn 12/11/2018   • Body mass index (BMI) of 50-59.9 in adult (CMS/[facility]) 05/20/2019   • Obesity, Class III, BMI 40-49.9 (morbid obesity) (CMS/[facility]) 03/19/2020     Past Medical History:   Diagnosis Date   • Anxiety    • Asthma    • GERD  (gastroesophageal reflux disease)    • Hirsutism    • History of cellulitis    • Joint pain    • PCOS (polycystic ovarian syndrome)    • PE (pulmonary thromboembolism) (CMS/HCC) 2018   • Plantar fasciitis    • Sleep apnea    • Urinary urgency          PAST SURGICAL HISTORY  Past Surgical History:   Procedure Laterality Date   • DILATATION AND CURETTAGE     • ECTOPIC PREGNANCY     • ENDOSCOPY N/A 4/9/2019    Procedure: ESOPHAGOGASTRODUODENOSCOPY WITH BIOPSY;  Surgeon: Stevan Hook Jr., MD;  Location: [facility] ENDOSCOPY;  Service: General   • ENDOSCOPY N/A 6/26/2020    Procedure: ESOPHAGOGASTRODUODENOSCOPY WITH biopsy and balloon DILATATION 18, 19, 20;  Surgeon: Stevan Hook Jr., MD;  Location: [facility] ENDOSCOPY;  Service: General;  Laterality: N/A;  pre: dysphagia and GERD  post: gastritis and esophagitis   • FOOT SURGERY     • GASTRIC SLEEVE LAPAROSCOPIC N/A 5/20/2019    Procedure: GASTRIC SLEEVE LAPAROSCOPIC AND HITAL HERNIA REPAIR;  Surgeon: Stevan Hook Jr., MD;  Location: [facility] OR [facility];  Service: Bariatric   • HYSTERECTOMY     • SALPINGECTOMY     • TUBAL ABDOMINAL LIGATION     • WISDOM TOOTH EXTRACTION           FAMILY HISTORY  Family History   Problem Relation Age of Onset   • Diabetes Mother    • Hypertension Mother    • Pancreatic cancer Mother    • Hypertension Father    • Malig Hyperthermia Neg Hx          SOCIAL HISTORY  Social History     Socioeconomic History   • Marital status: Single     Spouse name: Not on file   • Number of children: 2   • Years of education: Not on file   • Highest education level: Not on file   Tobacco Use   • Smoking status: Former Smoker     Years: 15.00     Types: Cigarettes   • Smokeless tobacco: Never Used   • Tobacco comment: QUIT 2009   Substance and Sexual Activity   • Alcohol use: Yes     Alcohol/week: 2.0 standard drinks     Types: 2 Standard drinks or equivalent per week     Comment: social   • Drug use: No   • Sexual activity: Defer          ALLERGIES  Levofloxacin and Penicillins        REVIEW OF SYSTEMS  Review of Systems         All systems reviewed and negative except for those discussed in HPI.     PHYSICAL EXAM    I have reviewed the triage vital signs and nursing notes.    ED Triage Vitals   Temp Heart Rate Resp BP SpO2   06/20/21 1830 06/20/21 1830 06/20/21 1830 06/20/21 1854 06/20/21 1830   98.8 °F (37.1 °C) 101 20 123/77 97 %      Temp src Heart Rate Source Patient Position BP Location FiO2 (%)   06/20/21 1830 06/20/21 1830 -- -- --   Tympanic Monitor          GENERAL: 46-year-old well developed, well nourished in no acute distress  HENT: NCAT, neck supple, trachea midline  EYES: no scleral icterus, PERRL, normal conjunctiva  CV: regular rhythm, regular rate, no murmur  RESPIRATORY: unlabored effort, CTAB  ABDOMEN: soft, mild diffuse tenderness with no guarding or rebound and diminished bowel sounds  No flank tenderness  MUSCULOSKELETAL: no gross deformity, no pedal edema, no calf tenderness, no pain with movement of her torso  NEURO: alert,  sensory and motor function of extremities grossly intact, speech clear, mental status normal  SKIN: warm, dry, no rash  PSYCH:  Appropriate mood and affect      PPE  Pt does not present with symptoms for COVID19; however, I was wearing a mask and goggles throughout all patient interaction.    Vital signs and nursing notes reviewed.      LAB RESULTS  Recent Results (from the past 24 hour(s))   Urinalysis With Microscopic If Indicated (No Culture) - Urine, Clean Catch    Collection Time: 06/20/21  6:57 PM    Specimen: Urine, Clean Catch   Result Value Ref Range    Color, UA Dark Yellow (A) Yellow, Straw    Appearance, UA Cloudy (A) Clear    pH, UA 5.5 5.0 - 8.0    Specific Gravity, UA 1.029 1.005 - 1.030    Glucose, UA Negative Negative    Ketones, UA Trace (A) Negative    Bilirubin, UA Negative Negative    Blood, UA Moderate (2+) (A) Negative    Protein, UA Trace (A) Negative    Leuk Esterase, UA  Negative Negative    Nitrite, UA Negative Negative    Urobilinogen, UA 1.0 E.U./dL 0.2 - 1.0 E.U./dL   Urinalysis, Microscopic Only - Urine, Clean Catch    Collection Time: 06/20/21  6:57 PM    Specimen: Urine, Clean Catch   Result Value Ref Range    RBC, UA 6-12 (A) None Seen, 0-2 /HPF    WBC, UA 0-2 None Seen, 0-2 /HPF    Bacteria, UA None Seen None Seen /HPF    Squamous Epithelial Cells, UA 13-20 (A) None Seen, 0-2 /HPF    Hyaline Casts, UA 0-2 None Seen /LPF    Methodology Automated Microscopy    Comprehensive Metabolic Panel    Collection Time: 06/20/21  6:58 PM    Specimen: Blood   Result Value Ref Range    Glucose 86 65 - 99 mg/dL    BUN 16 6 - 20 mg/dL    Creatinine 0.96 0.57 - 1.00 mg/dL    Sodium 142 136 - 145 mmol/L    Potassium 3.3 (L) 3.5 - 5.2 mmol/L    Chloride 109 (H) 98 - 107 mmol/L    CO2 24.7 22.0 - 29.0 mmol/L    Calcium 8.8 8.6 - 10.5 mg/dL    Total Protein 6.5 6.0 - 8.5 g/dL    Albumin 4.10 3.50 - 5.20 g/dL    ALT (SGPT) 14 1 - 33 U/L    AST (SGOT) 12 1 - 32 U/L    Alkaline Phosphatase 77 39 - 117 U/L    Total Bilirubin 0.5 0.0 - 1.2 mg/dL    eGFR Non African Amer 63 >60 mL/min/1.73    Globulin 2.4 gm/dL    A/G Ratio 1.7 g/dL    BUN/Creatinine Ratio 16.7 7.0 - 25.0    Anion Gap 8.3 5.0 - 15.0 mmol/L   Lipase    Collection Time: 06/20/21  6:58 PM    Specimen: Blood   Result Value Ref Range    Lipase 27 13 - 60 U/L   Green Top (Gel)    Collection Time: 06/20/21  6:58 PM   Result Value Ref Range    Extra Tube Hold for add-ons.    Lavender Top    Collection Time: 06/20/21  6:58 PM   Result Value Ref Range    Extra Tube hold for add-on    Gold Top - SST    Collection Time: 06/20/21  6:58 PM   Result Value Ref Range    Extra Tube Hold for add-ons.    CBC Auto Differential    Collection Time: 06/20/21  6:58 PM    Specimen: Blood   Result Value Ref Range    WBC 9.49 3.40 - 10.80 10*3/mm3    RBC 4.29 3.77 - 5.28 10*6/mm3    Hemoglobin 13.0 12.0 - 15.9 g/dL    Hematocrit 37.6 34.0 - 46.6 %    MCV 87.6  79.0 - 97.0 fL    MCH 30.3 26.6 - 33.0 pg    MCHC 34.6 31.5 - 35.7 g/dL    RDW 13.3 12.3 - 15.4 %    RDW-SD 42.0 37.0 - 54.0 fl    MPV 8.6 6.0 - 12.0 fL    Platelets 241 140 - 450 10*3/mm3    Neutrophil % 65.8 42.7 - 76.0 %    Lymphocyte % 26.0 19.6 - 45.3 %    Monocyte % 5.5 5.0 - 12.0 %    Eosinophil % 2.3 0.3 - 6.2 %    Basophil % 0.3 0.0 - 1.5 %    Immature Grans % 0.1 0.0 - 0.5 %    Neutrophils, Absolute 6.24 1.70 - 7.00 10*3/mm3    Lymphocytes, Absolute 2.47 0.70 - 3.10 10*3/mm3    Monocytes, Absolute 0.52 0.10 - 0.90 10*3/mm3    Eosinophils, Absolute 0.22 0.00 - 0.40 10*3/mm3    Basophils, Absolute 0.03 0.00 - 0.20 10*3/mm3    Immature Grans, Absolute 0.01 0.00 - 0.05 10*3/mm3    nRBC 0.0 0.0 - 0.2 /100 WBC       Ordered the above labs and independently reviewed the results.        RADIOLOGY  CT Abdomen Pelvis With Contrast    Result Date: 6/20/2021  Patient: GEORGIE CORRALES  Time Out: 20:56 Exam(s): CT ABDOMEN + PELVIS With Contrast EXAM:   CT Abdomen and Pelvis With Intravenous Contrast CLINICAL HISTORY:    Reason for exam: Abdominal pain back pain with nausea and vomiting. TECHNIQUE:   Axial computed tomography images of the abdomen and pelvis with intravenous contrast.  CTDI is 36.6 mGy and DLP is 1995.9 mGy-cm.  This CT exam was performed according to the principle of ALARA (As Low As Reasonably Achievable) by using one or more of the following dose reduction techniques: automated exposure control, adjustment of the mA and or kV according to patient size, and or use of iterative reconstruction technique. COMPARISON: Abdominal ultrasound of 8 12 20. FINDINGS:   Lung bases demonstrate no acute infiltrate.  Mild right middle lobe atelectasis. The liver, biliary tree, pancreas, and adrenal glands are within normal limits.  Calcified splenic granuloma.  5 mm mid zone and 2 mm lower pole right renal calculi.  No ureteral stone or hydronephrosis. Status post gastric resection sleeve procedure.  No bowel  obstruction or perforation.  The appendix is unremarkable.  Status post hysterectomy.  3. 5 cm left ovarian cyst.  No adjacent edema. No abdominal aortic aneurysm.  L5 laminectomy.  No acute fracture. IMPRESSION:     Status post gastric sleeve procedure.  No bowel obstruction or perforation. Status post hysterectomy.  3.5 cm left ovarian cyst. Nonobstructing right renal calculi.     Electronically signed by Luis Fernando Chang M.D. on 06-20-21 at 2056      I ordered the above noted radiological studies. Independently reviewed by me and discussed with radiologist.  See dictation above for official radiology interpretation.      PROCEDURES    Procedures        MEDICATIONS GIVEN IN ER    Medications   sodium chloride 0.9 % bolus 1,000 mL (0 mL Intravenous Stopped 6/20/21 2036)   iopamidol (ISOVUE-300) 61 % injection 100 mL (85 mL Intravenous Given by Other 6/20/21 1936)         PROGRESS, DATA ANALYSIS, CONSULTS, AND MEDICAL DECISION MAKING    All labs have been independently reviewed by me.  All radiology studies have been reviewed by me and discussed with radiologist dictating report.   EKG's independently reviewed by me.  Discussion below represents my analysis of pertinent findings related to patient's condition, differential diagnosis, treatment plan and final disposition.      ED Course as of Jun 21 0149   Sun Jun 20, 2021   1906 I will treat the patient with Dilaudid, Zofran and IV fluids while obtaining labs, urinalysis and abdominal CT for further evaluation    [GP]   2102 Currently the patient is resting comfortably in the room in no acute distress.  Her CT of the abdomen pelvis is unremarkable.  Upon review of her gallbladder ultrasound from last year it appears the patient did have some gallstones.  The patient currently is pain-free and has not vomited in the emergency room.  I advised her I give her a short course of pain medicine, nausea medicine and she should go on a clear liquid diet for the next 24 hours.   I advised her to call Dr. Callahan tomorrow to follow-up about her gallbladder.  The patient understands and agrees the plan.    [GP]      ED Course User Index  [GP] Jonathan Avalos MD           The differential diagnosis includes but is not limited to gastritis, pancreatitis, cholecystitis, appendicitis, diverticulitis, urinary tract infection, kidney stone, or bowel obstruction.          AS OF 01:49 EDT VITALS:    BP - 124/85  HR - 82  TEMP - 98.8 °F (37.1 °C) (Tympanic)  02 SATS - 99%        DIAGNOSIS  Final diagnoses:   Biliary colic   Generalized abdominal pain         DISPOSITION  DISCHARGE    Patient discharged in stable condition.    Reviewed implications of results, diagnosis, meds, responsibility to follow up, warning signs and symptoms of possible worsening, potential complications and reasons to return to ER, including worsening symptoms, fever or intractable vomiting.    Patient/Family voiced understanding of above instructions.    Discussed plan for discharge, as there is no emergent indication for admission.  Pt/family is agreeable and understands need for follow up and repeat testing.  Pt is aware that discharge does not mean that nothing is wrong but it indicates no emergency is present and they must continue care with follow-up as given below or physician of their choice.     FOLLOW-UP  Cedrick Callahan Jr., MD  [address] [id number] [phone]    Schedule an appointment as soon as possible for a visit                 EMR Dragon/Transcription disclaimer:   Much of this encounter note is an electronic transcription/translation of spoken language to printed text.        Jonathan Avalos MD  06/21/21 0149

## 2021-06-21 NOTE — DISCHARGE INSTRUCTIONS
Take medicines as prescribed.  Clear liquid diet for 24 hours.  Call Dr. Callahan tomorrow for an appointment.  Return if worse.

## 2021-06-22 ENCOUNTER — OFFICE VISIT (OUTPATIENT)
Dept: BARIATRICS/WEIGHT MGMT | Facility: CLINIC | Age: 46
End: 2021-06-22

## 2021-06-22 VITALS
WEIGHT: 255 LBS | SYSTOLIC BLOOD PRESSURE: 129 MMHG | TEMPERATURE: 97.8 F | BODY MASS INDEX: 42.49 KG/M2 | DIASTOLIC BLOOD PRESSURE: 77 MMHG | HEART RATE: 73 BPM | HEIGHT: 65 IN | RESPIRATION RATE: 18 BRPM

## 2021-06-22 DIAGNOSIS — G47.33 OSA (OBSTRUCTIVE SLEEP APNEA): ICD-10-CM

## 2021-06-22 DIAGNOSIS — K21.9 GASTROESOPHAGEAL REFLUX DISEASE, UNSPECIFIED WHETHER ESOPHAGITIS PRESENT: ICD-10-CM

## 2021-06-22 DIAGNOSIS — Z98.84 S/P LAPAROSCOPIC SLEEVE GASTRECTOMY: ICD-10-CM

## 2021-06-22 DIAGNOSIS — R53.82 CHRONIC FATIGUE, UNSPECIFIED: ICD-10-CM

## 2021-06-22 DIAGNOSIS — Z71.3 DIETARY COUNSELING: ICD-10-CM

## 2021-06-22 DIAGNOSIS — E66.01 OBESITY, CLASS III, BMI 40-49.9 (MORBID OBESITY) (HCC): Primary | ICD-10-CM

## 2021-06-22 PROCEDURE — 99214 OFFICE O/P EST MOD 30 MIN: CPT | Performed by: SURGERY

## 2021-06-22 RX ORDER — POTASSIUM CHLORIDE 20 MEQ/1
20 TABLET, EXTENDED RELEASE ORAL
COMMUNITY
Start: 2021-06-21 | End: 2021-07-05

## 2021-06-22 NOTE — PROGRESS NOTES
[facility]GERY  [address] [facility] [address]  [facility] [id number] [phone]  [address] [id number] [phone]  Dept: [phone]  6/22/2021      Jessica Mota.  [id number]  [id number]  1975  female      Chief Complaint   Patient presents with   • Follow-up     Sleeve Follow up/Issues       BH Post-Op Bariatric Surgery:   Jessica Mota is status post Laparoscopic Sleeve/HH procedure, performed on 5/20/19     HPI:   Today's weight is 116 kg (255 lb) pounds, today's BMI is Body mass index is 42.43 kg/m²., a  gain of 10 pounds since the last visit and weight loss since surgery is 82 pounds. The patient reports a decreased portion size and loss of appetite.      Jessica Mota denies nausea vomiting fever chills chest pain shortness of air or abdominal pain and reports bloating symptoms as well as some heartburn.  She does take omeprazole daily.  She feels bloated and gassy depending on what she is eating.  She is a  it works at the [employer] and is walking throughout her job.  She does eat occasional fried foods that are at work.  She knows what she has been doing wrong.     Diet and Exercise: Diet history reviewed and discussed with the patient. Weight loss/gains to date discussed with the patient. The patient states they are eating 60 grams of protein per day. She reports eating 2 meals per day, a typical portion size of 1 cup, eating 2-3 snacks per day, drinking 5 or more 8-oz. glasses of water per day, no carbonated beverage consumption and exercising regularly.     Supplements: Bariatric per gastric sleeve.     Review of Systems   Constitutional: Positive for fatigue.   Gastrointestinal: Positive for constipation.   Musculoskeletal: Positive for arthralgias.   All other systems reviewed and are negative.      Patient Active Problem List   Diagnosis   • SHANDA (obstructive sleep apnea)   • Vitamin D  insufficiency   • Thyroid nodule   • Pulmonary embolism (CMS/HCC)   • Depression with anxiety   • Edema   • Dietary counseling   • Labile blood pressure   • Multiple joint pain   • Prediabetes   • Obesity, Class II, BMI 35-39.9   • S/P laparoscopic sleeve gastrectomy   • Obesity, Class III, BMI 40-49.9 (morbid obesity) (CMS/HCC)   • Epigastric discomfort   • Esophageal reflux   • Other dysphagia   • Gastroesophageal reflux disease   • Calculus of gallbladder with chronic cholecystitis without obstruction   • Chronic fatigue, unspecified       Past Medical History:   Diagnosis Date   • Anxiety    • Asthma     EXERCISE INDUCED ASTHMA    • GERD (gastroesophageal reflux disease)    • Heartburn    • Hirsutism    • History of cellulitis    • Joint pain     GENERALIZED   • PCOS (polycystic ovarian syndrome)    • PE (pulmonary thromboembolism) (CMS/HCC) 2018   • Plantar fasciitis    • Sleep apnea     NOT CURRENTLY USING MACHINE,NEW SLEEP STUDY IN SEPT.   • Thyroid nodule    • Urinary urgency        The following portions of the patient's history were reviewed and updated as appropriate: allergies, current medications, past family history, past medical history, past social history, past surgical history and problem list.    Vitals:    06/22/21 1539   BP: 129/77   Pulse: 73   Resp: 18   Temp: 97.8 °F (36.6 °C)       Physical Exam  Vitals reviewed.   HENT:      Head: Normocephalic and atraumatic.      Mouth/Throat:      Mouth: Mucous membranes are moist.      Pharynx: Oropharynx is clear.   Eyes:      General: No scleral icterus.     Extraocular Movements: Extraocular movements intact.      Conjunctiva/sclera: Conjunctivae normal.      Pupils: Pupils are equal, round, and reactive to light.   Neck:      Thyroid: No thyromegaly.   Cardiovascular:      Rate and Rhythm: Normal rate.   Pulmonary:      Effort: Pulmonary effort is normal. No respiratory distress.      Breath sounds: Normal breath sounds. No stridor. No wheezing or  rhonchi.   Abdominal:      General: Bowel sounds are normal.      Palpations: Abdomen is soft.      Tenderness: There is no abdominal tenderness. There is no right CVA tenderness, left CVA tenderness, guarding or rebound.      Hernia: No hernia is present.   Musculoskeletal:         General: Normal range of motion.      Cervical back: Normal range of motion and neck supple.   Lymphadenopathy:      Cervical: No cervical adenopathy.   Skin:     General: Skin is warm and dry.      Findings: No erythema.   Neurological:      Mental Status: She is alert and oriented to person, place, and time.   Psychiatric:         Mood and Affect: Mood normal.         Behavior: Behavior normal.         Thought Content: Thought content normal.         Judgment: Judgment normal.         Assessment:   Post-op, the patient status post laparoscopic sleeve gastrectomy and hiatal hernia.  May 2019 who is gotten off track with her weight loss.  Had a very long discussion with her regarding clean eating concentrating on lean protein vegetables and fruit.  She has been snacking Throughout the day and eating out quite a bit.  We discussed concentrating on eating meals only and no snacking.  Also get back with her exercise routine especially some strength training.  Recommend to start Metamucil back which helps with her constipation initially but I do think she may have some irritable bowel syndrome with constipation which instructed her to take Metamucil daily.  Continue with her omeprazole once a day.  Also discussed doing the 2 liver reduction diet to reset her tool which he would like to do.  Follow-up in 3 months and plan to lose 15 pounds.  Also has history of gallstones which she saw Dr. Callahan and I instructed her if the symptoms do not resolve or if any additional symptoms to contact Dr. Callahan to proceed with cholecystectomy..     Encounter Diagnoses   Name Primary?   • Obesity, Class III, BMI 40-49.9 (morbid obesity) (CMS/HCC) Yes   •  Gastroesophageal reflux disease, unspecified whether esophagitis present    • S/P laparoscopic sleeve gastrectomy    • Dietary counseling    • SHANDA (obstructive sleep apnea)    • Chronic fatigue, unspecified        Plan:     Encouraged patient to be sure to get plenty of lean protein per day through small frequent meals all with a protein source.   Activity restrictions: none.   Recommended patient be sure to get at least 70 grams of protein per day by eating small, frequent meals all with high lean protein choices. Be sure to limit/cut back on daily carbohydrate intake. Discussed with the patient the recommended amount of water per day to intake- half of body weight in ounces. Reviewed vitamin requirements. Be sure to do routine exercise, 150 minutes per week minimum, including both cardio and strength training.     Instructions / Recommendations: dietary counseling recommended, recommended a daily protein intake of  grams, vitamin supplement(s) recommended, recommended exercising at least 150 minutes per week, behavior modifications recommended and instructed to call the office for concerns, questions, or problems.     The patient was instructed to follow up in 3 months.     The patient was counseled regarding the above procedure. Total time spent on this encounter was  30 minutes including reviewing previous notes, lab results and face to face time spent with the patient.  The majority of time was spent counseling the patient regarding diet and exercise as well as reviewing their medications and their compliance with the procedure.

## 2021-07-20 ENCOUNTER — TELEPHONE (OUTPATIENT)
Dept: BARIATRICS/WEIGHT MGMT | Facility: CLINIC | Age: 46
End: 2021-07-20

## 2021-07-20 NOTE — TELEPHONE ENCOUNTER
Called patient regarding mychart message and symptoms that she is currently having. patient was advised per LATRICEO at her last appointment to follow up with general surgery for gallbladder work up. Patient had no done this yet. She was also taking her heartburn medication after already experiencing heartburn symptoms. it was discussed with the patient to try and take the medication prior to eating. Patient has also not been regular with taking metamucil but stated she would restart that this week. patient was instructed to contact our office after seeing general surgery regarding any follow up needed with us. Patient agreed with the plan.    ----- Message from Jessica Mota sent at 7/20/2021  3:04 PM EDT -----  Regarding: Non-Urgent Medical Question  Contact: [phone]  I'm so sick. I'm not using the bathroom hardly at all and my heartburn is uncontrollable. Always feel bloated and like something is in my throat and sick to my stomach constantly. I dont know what to do

## 2021-08-10 ENCOUNTER — OFFICE VISIT (OUTPATIENT)
Dept: BARIATRICS/WEIGHT MGMT | Facility: CLINIC | Age: 46
End: 2021-08-10

## 2021-08-10 VITALS
HEART RATE: 79 BPM | HEIGHT: 65 IN | DIASTOLIC BLOOD PRESSURE: 78 MMHG | TEMPERATURE: 98 F | WEIGHT: 244 LBS | BODY MASS INDEX: 40.65 KG/M2 | SYSTOLIC BLOOD PRESSURE: 122 MMHG

## 2021-08-10 DIAGNOSIS — Z71.3 DIETARY COUNSELING: ICD-10-CM

## 2021-08-10 DIAGNOSIS — R13.19 OTHER DYSPHAGIA: ICD-10-CM

## 2021-08-10 DIAGNOSIS — K58.1 IRRITABLE BOWEL SYNDROME WITH CONSTIPATION: ICD-10-CM

## 2021-08-10 DIAGNOSIS — K80.10 CALCULUS OF GALLBLADDER WITH CHRONIC CHOLECYSTITIS WITHOUT OBSTRUCTION: ICD-10-CM

## 2021-08-10 DIAGNOSIS — K21.00 GASTROESOPHAGEAL REFLUX DISEASE WITH ESOPHAGITIS WITHOUT HEMORRHAGE: Primary | ICD-10-CM

## 2021-08-10 DIAGNOSIS — Z98.84 S/P LAPAROSCOPIC SLEEVE GASTRECTOMY: ICD-10-CM

## 2021-08-10 DIAGNOSIS — E66.9 OBESITY, CLASS II, BMI 35-39.9: ICD-10-CM

## 2021-08-10 PROBLEM — K21.9 ESOPHAGEAL REFLUX: Status: RESOLVED | Noted: 2020-03-19 | Resolved: 2021-08-10

## 2021-08-10 PROCEDURE — 99214 OFFICE O/P EST MOD 30 MIN: CPT | Performed by: SURGERY

## 2021-08-10 RX ORDER — PANTOPRAZOLE SODIUM 40 MG/1
40 TABLET, DELAYED RELEASE ORAL DAILY
Qty: 30 TABLET | Refills: 5 | Status: SHIPPED | OUTPATIENT
Start: 2021-08-10 | End: 2021-11-10

## 2021-08-10 RX ORDER — SODIUM CHLORIDE, SODIUM LACTATE, POTASSIUM CHLORIDE, CALCIUM CHLORIDE 600; 310; 30; 20 MG/100ML; MG/100ML; MG/100ML; MG/100ML
30 INJECTION, SOLUTION INTRAVENOUS CONTINUOUS
Status: CANCELLED | OUTPATIENT
Start: 2021-08-13

## 2021-08-10 NOTE — PROGRESS NOTES
[facility]AT[facility] BARIATRIC SURGERY  [address] [id number] [phone]  [address] [id number] [phone]  Dept: [phone]  8/10/2021      Jessica Mota.  [id number]  [id number]  1975  female      No chief complaint on file.       Post-Op Bariatric Surgery:   Jessica Mota is status post Laparoscopic Sleeve/HH procedure, performed on 5/20/19     HPI:   Today's weight is 111 kg (244 lb) pounds, today's BMI is Body mass index is 40.6 kg/m²., a  loss of 11 pounds since the last visit and weight loss since surgery is 94 pounds. The patient reports a decreased portion size and loss of appetite.      Jessica Mota denies fever chills chest pain shortness of air and reports heartburn and left-sided abdominal pain intermittently after eating     Diet and Exercise: Diet history reviewed and discussed with the patient. Weight loss/gains to date discussed with the patient. The patient states they are eating unknown grams of protein per day. She reports eating 3 meals per day, a typical portion size of 1 cup, eating 2 snacks per day, drinking 5 or more 8-oz. glasses of water per day, no carbonated beverage consumption and exercising regularly.     Supplements: Multivitamin, vitamin D.     Review of Systems   Constitutional: Positive for fatigue.   Gastrointestinal: Positive for abdominal pain, constipation and nausea.   Musculoskeletal: Positive for arthralgias.   All other systems reviewed and are negative.      Patient Active Problem List   Diagnosis   • SHANDA (obstructive sleep apnea)   • Vitamin D insufficiency   • Thyroid nodule   • Pulmonary embolism (CMS/HCC)   • Depression with anxiety   • Edema   • Dietary counseling   • Labile blood pressure   • Multiple joint pain   • Prediabetes   • Obesity, Class II, BMI 35-39.9   • S/P laparoscopic sleeve gastrectomy   • Obesity, Class III, BMI 40-49.9 (morbid obesity) (CMS/HCC)    • Epigastric discomfort   • Other dysphagia   • Gastroesophageal reflux disease   • Calculus of gallbladder with chronic cholecystitis without obstruction   • Chronic fatigue, unspecified       Past Medical History:   Diagnosis Date   • Anxiety    • Asthma     EXERCISE INDUCED ASTHMA    • GERD (gastroesophageal reflux disease)    • Heartburn    • Hirsutism    • History of cellulitis    • Joint pain     GENERALIZED   • PCOS (polycystic ovarian syndrome)    • PE (pulmonary thromboembolism) (CMS/[facility]) 2018   • Plantar fasciitis    • Sleep apnea     NOT CURRENTLY USING MACHINE,NEW SLEEP STUDY IN SEPT.   • Thyroid nodule    • Urinary urgency        The following portions of the patient's history were reviewed and updated as appropriate: allergies, current medications, past family history, past medical history, past social history, past surgical history and problem list.    Vitals:    08/10/21 1536   BP: 122/78   Pulse: 79   Temp: 98 °F (36.7 °C)       Physical Exam  Constitutional:       Appearance: Normal appearance.   HENT:      Head: Normocephalic and atraumatic.   Eyes:      Conjunctiva/sclera: Conjunctivae normal.   Pulmonary:      Effort: Pulmonary effort is normal.   Neurological:      Mental Status: She is alert and oriented to person, place, and time.   Psychiatric:         Mood and Affect: Mood normal.         Behavior: Behavior normal.         Thought Content: Thought content normal.         Judgment: Judgment normal.         Assessment:   Post-op, the patient status post laparoscopic sleeve gastrectomy and hiatal hernia repair May 2019 resolve last month who is gotten off track with her weight loss.  She complains of heartburn as well as intermittent abdominal pain greater on the left side with eating certain foods.  She does have history of gallstones and has appointment see Dr. Callahan this week.  She saw her primary care physician and scheduled for repeat ultrasound tomorrow as well.  We will proceed with  upper endoscopy with dilatation of the pylorus and also evaluate the esophagus.  We will start on Protonix instead of omeprazole and await further results.  I instructed her to take the Metamucil daily to see if this will help with her irritable bowel syndrome with constipation.. The risks and benefits of the procedure were discussed with the patient in detail and all questions were answered.  Possibility of perforation, bleeding, aspiration, anoxic brain injury, respiratory and/or cardiac arrest and death were discussed.  Consent will be signed and witnessed.    Encounter Diagnoses   Name Primary?   • Gastroesophageal reflux disease with esophagitis without hemorrhage Yes   • Other dysphagia    • S/P laparoscopic sleeve gastrectomy    • Obesity, Class II, BMI 35-39.9    • Dietary counseling    • Calculus of gallbladder with chronic cholecystitis without obstruction        Plan:     Encouraged patient to be sure to get plenty of lean protein per day through small frequent meals all with a protein source.   Activity restrictions: none.   Recommended patient be sure to get at least 70 grams of protein per day by eating small, frequent meals all with high lean protein choices. Be sure to limit/cut back on daily carbohydrate intake. Discussed with the patient the recommended amount of water per day to intake- half of body weight in ounces. Reviewed vitamin requirements. Be sure to do routine exercise, 150 minutes per week minimum, including both cardio and strength training.     Instructions / Recommendations: dietary counseling recommended, recommended a daily protein intake of  grams, vitamin supplement(s) recommended, recommended exercising at least 150 minutes per week, behavior modifications recommended and instructed to call the office for concerns, questions, or problems.     The patient was instructed to follow up in after procedures and testing.     The patient was counseled regarding the above procedure.  Total time spent on this encounter was  30 minutes including reviewing previous notes, lab results and face to face time spent with the patient.  The majority of time was spent counseling the patient regarding diet and exercise as well as reviewing their medications and their compliance with the procedure.

## 2021-08-16 ENCOUNTER — APPOINTMENT (OUTPATIENT)
Dept: CARDIOLOGY | Facility: HOSPITAL | Age: 46
End: 2021-08-16

## 2021-08-16 ENCOUNTER — HOSPITAL ENCOUNTER (EMERGENCY)
Facility: HOSPITAL | Age: 46
Discharge: HOME | End: 2021-08-17
Attending: EMERGENCY MEDICINE | Admitting: EMERGENCY MEDICINE

## 2021-08-16 ENCOUNTER — APPOINTMENT (OUTPATIENT)
Dept: ULTRASOUND IMAGING | Facility: HOSPITAL | Age: 46
End: 2021-08-16

## 2021-08-16 ENCOUNTER — APPOINTMENT (OUTPATIENT)
Dept: GENERAL RADIOLOGY | Facility: HOSPITAL | Age: 46
End: 2021-08-16

## 2021-08-16 VITALS
OXYGEN SATURATION: 100 % | WEIGHT: 240 LBS | DIASTOLIC BLOOD PRESSURE: 60 MMHG | BODY MASS INDEX: 39.99 KG/M2 | RESPIRATION RATE: 18 BRPM | TEMPERATURE: 97.8 F | HEART RATE: 67 BPM | HEIGHT: 65 IN | SYSTOLIC BLOOD PRESSURE: 91 MMHG

## 2021-08-16 DIAGNOSIS — R60.0 PEDAL EDEMA: ICD-10-CM

## 2021-08-16 DIAGNOSIS — R10.9 ABDOMINAL PAIN, UNSPECIFIED ABDOMINAL LOCATION: ICD-10-CM

## 2021-08-16 DIAGNOSIS — R07.89 ATYPICAL CHEST PAIN: Primary | ICD-10-CM

## 2021-08-16 LAB
ALBUMIN SERPL-MCNC: 4 G/DL (ref 3.5–5.2)
ALBUMIN/GLOB SERPL: 1.4 G/DL
ALP SERPL-CCNC: 79 U/L (ref 39–117)
ALT SERPL W P-5'-P-CCNC: 24 U/L (ref 1–33)
ANION GAP SERPL CALCULATED.3IONS-SCNC: 9.8 MMOL/L (ref 5–15)
AST SERPL-CCNC: 16 U/L (ref 1–32)
BACTERIA UR QL AUTO: ABNORMAL /HPF
BASOPHILS # BLD AUTO: 0.04 10*3/MM3 (ref 0–0.2)
BASOPHILS NFR BLD AUTO: 0.5 % (ref 0–1.5)
BH CV LOWER VASCULAR LEFT COMMON FEMORAL AUGMENT: NORMAL
BH CV LOWER VASCULAR LEFT COMMON FEMORAL COMPETENT: NORMAL
BH CV LOWER VASCULAR LEFT COMMON FEMORAL COMPRESS: NORMAL
BH CV LOWER VASCULAR LEFT COMMON FEMORAL PHASIC: NORMAL
BH CV LOWER VASCULAR LEFT COMMON FEMORAL SPONT: NORMAL
BH CV LOWER VASCULAR LEFT DISTAL FEMORAL COMPRESS: NORMAL
BH CV LOWER VASCULAR LEFT GASTRONEMIUS COMPRESS: NORMAL
BH CV LOWER VASCULAR LEFT GREATER SAPH AK COMPRESS: NORMAL
BH CV LOWER VASCULAR LEFT GREATER SAPH BK COMPRESS: NORMAL
BH CV LOWER VASCULAR LEFT LESSER SAPH COMPRESS: NORMAL
BH CV LOWER VASCULAR LEFT MID FEMORAL AUGMENT: NORMAL
BH CV LOWER VASCULAR LEFT MID FEMORAL COMPETENT: NORMAL
BH CV LOWER VASCULAR LEFT MID FEMORAL COMPRESS: NORMAL
BH CV LOWER VASCULAR LEFT MID FEMORAL PHASIC: NORMAL
BH CV LOWER VASCULAR LEFT MID FEMORAL SPONT: NORMAL
BH CV LOWER VASCULAR LEFT PERONEAL COMPRESS: NORMAL
BH CV LOWER VASCULAR LEFT POPLITEAL AUGMENT: NORMAL
BH CV LOWER VASCULAR LEFT POPLITEAL COMPETENT: NORMAL
BH CV LOWER VASCULAR LEFT POPLITEAL COMPRESS: NORMAL
BH CV LOWER VASCULAR LEFT POPLITEAL PHASIC: NORMAL
BH CV LOWER VASCULAR LEFT POPLITEAL SPONT: NORMAL
BH CV LOWER VASCULAR LEFT POSTERIOR TIBIAL COMPRESS: NORMAL
BH CV LOWER VASCULAR LEFT PROFUNDA FEMORAL COMPRESS: NORMAL
BH CV LOWER VASCULAR LEFT PROXIMAL FEMORAL COMPRESS: NORMAL
BH CV LOWER VASCULAR LEFT SAPHENOFEMORAL JUNCTION COMPRESS: NORMAL
BH CV LOWER VASCULAR RIGHT COMMON FEMORAL AUGMENT: NORMAL
BH CV LOWER VASCULAR RIGHT COMMON FEMORAL COMPETENT: NORMAL
BH CV LOWER VASCULAR RIGHT COMMON FEMORAL COMPRESS: NORMAL
BH CV LOWER VASCULAR RIGHT COMMON FEMORAL PHASIC: NORMAL
BH CV LOWER VASCULAR RIGHT COMMON FEMORAL SPONT: NORMAL
BILIRUB SERPL-MCNC: 0.9 MG/DL (ref 0–1.2)
BILIRUB UR QL STRIP: NEGATIVE
BUN SERPL-MCNC: 8 MG/DL (ref 6–20)
BUN/CREAT SERPL: 8.3 (ref 7–25)
CALCIUM SPEC-SCNC: 9.1 MG/DL (ref 8.6–10.5)
CHLORIDE SERPL-SCNC: 106 MMOL/L (ref 98–107)
CLARITY UR: CLEAR
CO2 SERPL-SCNC: 24.2 MMOL/L (ref 22–29)
COLOR UR: YELLOW
CREAT SERPL-MCNC: 0.96 MG/DL (ref 0.57–1)
DEPRECATED RDW RBC AUTO: 43.2 FL (ref 37–54)
EOSINOPHIL # BLD AUTO: 0.12 10*3/MM3 (ref 0–0.4)
EOSINOPHIL NFR BLD AUTO: 1.6 % (ref 0.3–6.2)
ERYTHROCYTE [DISTWIDTH] IN BLOOD BY AUTOMATED COUNT: 13.2 % (ref 12.3–15.4)
GFR SERPL CREATININE-BSD FRML MDRD: 63 ML/MIN/1.73
GLOBULIN UR ELPH-MCNC: 2.9 GM/DL
GLUCOSE BLDC GLUCOMTR-MCNC: 88 MG/DL (ref 70–130)
GLUCOSE SERPL-MCNC: 89 MG/DL (ref 65–99)
GLUCOSE UR STRIP-MCNC: NEGATIVE MG/DL
HCT VFR BLD AUTO: 42.3 % (ref 34–46.6)
HGB BLD-MCNC: 13.6 G/DL (ref 12–15.9)
HGB UR QL STRIP.AUTO: NEGATIVE
HOLD SPECIMEN: NORMAL
HOLD SPECIMEN: NORMAL
HYALINE CASTS UR QL AUTO: ABNORMAL /LPF
IMM GRANULOCYTES # BLD AUTO: 0.02 10*3/MM3 (ref 0–0.05)
IMM GRANULOCYTES NFR BLD AUTO: 0.3 % (ref 0–0.5)
KETONES UR QL STRIP: ABNORMAL
LEUKOCYTE ESTERASE UR QL STRIP.AUTO: ABNORMAL
LIPASE SERPL-CCNC: 25 U/L (ref 13–60)
LYMPHOCYTES # BLD AUTO: 2.12 10*3/MM3 (ref 0.7–3.1)
LYMPHOCYTES NFR BLD AUTO: 28.7 % (ref 19.6–45.3)
MCH RBC QN AUTO: 29 PG (ref 26.6–33)
MCHC RBC AUTO-ENTMCNC: 32.2 G/DL (ref 31.5–35.7)
MCV RBC AUTO: 90.2 FL (ref 79–97)
MONOCYTES # BLD AUTO: 0.49 10*3/MM3 (ref 0.1–0.9)
MONOCYTES NFR BLD AUTO: 6.6 % (ref 5–12)
NEUTROPHILS NFR BLD AUTO: 4.59 10*3/MM3 (ref 1.7–7)
NEUTROPHILS NFR BLD AUTO: 62.3 % (ref 42.7–76)
NITRITE UR QL STRIP: NEGATIVE
NRBC BLD AUTO-RTO: 0 /100 WBC (ref 0–0.2)
PH UR STRIP.AUTO: 6.5 [PH] (ref 5–8)
PLATELET # BLD AUTO: 217 10*3/MM3 (ref 140–450)
PMV BLD AUTO: 9.1 FL (ref 6–12)
POTASSIUM SERPL-SCNC: 3.9 MMOL/L (ref 3.5–5.2)
PROT SERPL-MCNC: 6.9 G/DL (ref 6–8.5)
PROT UR QL STRIP: NEGATIVE
RBC # BLD AUTO: 4.69 10*6/MM3 (ref 3.77–5.28)
RBC # UR: ABNORMAL /HPF
REF LAB TEST METHOD: ABNORMAL
SODIUM SERPL-SCNC: 140 MMOL/L (ref 136–145)
SP GR UR STRIP: 1.02 (ref 1–1.03)
SQUAMOUS #/AREA URNS HPF: ABNORMAL /HPF
TROPONIN T SERPL-MCNC: <0.01 NG/ML (ref 0–0.03)
UROBILINOGEN UR QL STRIP: ABNORMAL
WBC # BLD AUTO: 7.38 10*3/MM3 (ref 3.4–10.8)
WBC UR QL AUTO: ABNORMAL /HPF
WHOLE BLOOD HOLD SPECIMEN: NORMAL

## 2021-08-16 PROCEDURE — 80053 COMPREHEN METABOLIC PANEL: CPT

## 2021-08-16 PROCEDURE — 85025 COMPLETE CBC W/AUTO DIFF WBC: CPT

## 2021-08-16 PROCEDURE — 82962 GLUCOSE BLOOD TEST: CPT

## 2021-08-16 PROCEDURE — 84484 ASSAY OF TROPONIN QUANT: CPT | Performed by: EMERGENCY MEDICINE

## 2021-08-16 PROCEDURE — 93005 ELECTROCARDIOGRAM TRACING: CPT | Performed by: EMERGENCY MEDICINE

## 2021-08-16 PROCEDURE — 99283 EMERGENCY DEPT VISIT LOW MDM: CPT

## 2021-08-16 PROCEDURE — 83690 ASSAY OF LIPASE: CPT | Performed by: EMERGENCY MEDICINE

## 2021-08-16 PROCEDURE — 81001 URINALYSIS AUTO W/SCOPE: CPT

## 2021-08-16 PROCEDURE — 93010 ELECTROCARDIOGRAM REPORT: CPT | Performed by: INTERNAL MEDICINE

## 2021-08-16 PROCEDURE — 71045 X-RAY EXAM CHEST 1 VIEW: CPT

## 2021-08-16 PROCEDURE — 76705 ECHO EXAM OF ABDOMEN: CPT

## 2021-08-16 PROCEDURE — 93971 EXTREMITY STUDY: CPT

## 2021-08-16 PROCEDURE — 36415 COLL VENOUS BLD VENIPUNCTURE: CPT

## 2021-08-16 RX ORDER — DICYCLOMINE HCL 20 MG
20 TABLET ORAL EVERY 6 HOURS PRN
Qty: 15 TABLET | Refills: 0 | Status: SHIPPED | OUTPATIENT
Start: 2021-08-16 | End: 2021-11-10

## 2021-08-16 NOTE — ED TRIAGE NOTES
Pt to ER via PV. Pt states bilateral leg swelling - left worse than right. Pt also c/o RUQ pain     Hx of blood clots    Patient in mask. This RN in appropriate PPE throughout the patient's entire encounter.

## 2021-08-16 NOTE — ED NOTES
Pt reports lower abd pain with bilateral leg swelling and pain - L worse than R for 5 days. Has hx of DVT & PE in 2019. Denies blood thinners. C/o nausea. Denies urinary symptoms. Pt a&ox4, abc's intact, NAD noted, ambulatory to triage.     Patient wearing mask during triage. RN wearing appropriate PPE during triage. Hand hygiene performed.       Lelia Savage, RN  08/16/21 1154

## 2021-08-16 NOTE — PROGRESS NOTES
Today's preliminary report.   Left lower extremity venous Doppler is negative for DVT.   Report called to NANCY Gordon

## 2021-08-17 LAB — QT INTERVAL: 443 MS

## 2021-08-17 NOTE — DISCHARGE INSTRUCTIONS
You are advised to follow closely with Dr. Gaitan in 2-3 days for recheck, final results of lab work and imaging testing, and further testing/treatment as needed.    Drink plenty of fluids-at least 64 ounces daily.  Low-fat diet, no spicy/fatty/tomato, citrus,  Low-sodium diet  Compression stockings while upright        Please return to the emergency department immediately with chest pain different than usual for you, shortness of air, persistent or worsening abdominal pain, persistent vomiting/fever, blood in emesis or stool, lightheadedness/fainting, problems with speech, one sided weakness/numbness, new incontinence, problems with vision,  or for worsening of symptoms or other concerns.

## 2021-08-17 NOTE — ED NOTES
Pt c/o of bilateral leg swelling, left side worse then right. Pulses palatable bilaterally with no redness. Started 6 days ago. Hx of DVT. Prolong standing at work with no compression stockings.   Pt also c/o of abd pain on right side radiating to back, started this am. Pt states she feels like it is a gallbladder attack, she has hx of.   Pt c/o of midsternum chest pain, describes it like a burning and pressure in her chest. Started this am when the abd pain started. Not a consist pain, comes and goes.  Pt doesn't c/o of shortness of breath.      Johnathan Guzman RN  08/16/21 2020       Johnathan Guzman RN  08/16/21 2021       Johnathan Guzman RN  08/16/21 2022       Johnathan Guzman RN  08/16/21 2023

## 2021-08-17 NOTE — ED PROVIDER NOTES
EMERGENCY DEPARTMENT ENCOUNTER    CHIEF COMPLAINT  Chief Complaint: Chest/abdominal pain  History given by: Patient  History limited by: Nothing  Room Number: 13/13  PMD: Juan Gaitan MD  Bariatric surgeon: Dr. Hook  General surgeon: Dr. Ag Callahan    HPI:  Pt is a 46 y.o. female presents complaining of right-sided discomfort, mid/lower sternal chest discomfort, burning in nature, worse when she lays flat and associated with reflux washing up into her mouth, worse since 4 AM today and constant since that time.  Patient reports severe nausea but denies vomiting.  Patient is concerned that the her symptoms may be attributable to gallbladder disease as she knows she has gallstones.  Patient denies shortness of air, cough, fever, dysuria, reports her last bowel movement was today without blood or black color, patient reports she has bilateral left greater than right lower extremity swelling for the past 5 days.  Patient reports she stands all day long at work and this makes her symptoms worse.  Patient reports she has a gastric sleeve 2 years ago, hysterectomy in the distant past    Duration: Leg swelling for 5 days, chest pain since 4 AM constant  Associated Symptoms: Nausea, swelling of her legs  Aggravating Factors: Chest discomfort worse when she lays flat, lower extremity swelling worse with standing up all day long  Alleviating Factors: Nothing  Treatment before arrival: Nothing    PAST MEDICAL HISTORY  Active Ambulatory Problems     Diagnosis Date Noted   • SHANDA (obstructive sleep apnea) 12/11/2018   • Vitamin D insufficiency 11/24/2014   • Thyroid nodule 12/11/2018   • Pulmonary embolism (CMS/HCC) 06/07/2018   • Depression with anxiety 12/11/2018   • Edema 12/11/2018   • Dietary counseling 12/11/2018   • Labile blood pressure 12/11/2018   • Multiple joint pain 12/11/2018   • Prediabetes 12/11/2018   • Obesity, Class II, BMI 35-39.9 05/06/2019   • S/P laparoscopic sleeve gastrectomy 08/21/2019   • Obesity,  Approved. Pt already scheduled for appt 2/21   Class III, BMI 40-49.9 (morbid obesity) (CMS/HCC) 03/19/2020   • Epigastric discomfort 03/19/2020   • Other dysphagia 06/11/2020   • Gastroesophageal reflux disease 06/12/2020   • Calculus of gallbladder with chronic cholecystitis without obstruction 08/25/2020   • Chronic fatigue, unspecified 06/22/2021   • Irritable bowel syndrome with constipation 08/10/2021     Resolved Ambulatory Problems     Diagnosis Date Noted   • Morbid obesity with BMI of 50.0-59.9, adult (CMS/HCC) 11/20/2014   • Heartburn 12/11/2018   • Body mass index (BMI) of 50-59.9 in adult (CMS/HCC) 05/20/2019   • Esophageal reflux 03/19/2020     Past Medical History:   Diagnosis Date   • Anxiety    • Asthma    • GERD (gastroesophageal reflux disease)    • Hirsutism    • History of cellulitis    • Joint pain    • PCOS (polycystic ovarian syndrome)    • PE (pulmonary thromboembolism) (CMS/HCC) 2018   • Plantar fasciitis    • Sleep apnea    • Urinary urgency        PAST SURGICAL HISTORY  Past Surgical History:   Procedure Laterality Date   • DILATATION AND CURETTAGE     • ECTOPIC PREGNANCY     • ENDOSCOPY N/A 4/9/2019    Procedure: ESOPHAGOGASTRODUODENOSCOPY WITH BIOPSY;  Surgeon: Stevan Hook Jr., MD;  Location: [facility] ENDOSCOPY;  Service: General   • ENDOSCOPY N/A 6/26/2020    Procedure: ESOPHAGOGASTRODUODENOSCOPY WITH biopsy and balloon DILATATION 18, 19, 20;  Surgeon: Stevan Hook Jr., MD;  Location: [facility] ENDOSCOPY;  Service: General;  Laterality: N/A;  pre: dysphagia and GERD  post: gastritis and esophagitis   • FOOT SURGERY     • GASTRIC SLEEVE LAPAROSCOPIC N/A 5/20/2019    Procedure: GASTRIC SLEEVE LAPAROSCOPIC AND HITAL HERNIA REPAIR;  Surgeon: Stevan Hook Jr., MD;  Location: [facility] OR [facility];  Service: Bariatric   • HYSTERECTOMY     • SALPINGECTOMY     • TUBAL ABDOMINAL LIGATION     • WISDOM TOOTH EXTRACTION         FAMILY HISTORY  Family History   Problem Relation Age of Onset   • Diabetes Mother    • Hypertension Mother     • Pancreatic cancer Mother    • Hypertension Father    • Malig Hyperthermia Neg Hx        SOCIAL HISTORY  Social History     Socioeconomic History   • Marital status: Single     Spouse name: Not on file   • Number of children: 2   • Years of education: Not on file   • Highest education level: Not on file   Tobacco Use   • Smoking status: Former Smoker     Years: 15.00     Types: Cigarettes   • Smokeless tobacco: Never Used   • Tobacco comment: QUIT 2009   Substance and Sexual Activity   • Alcohol use: Yes     Alcohol/week: 2.0 standard drinks     Types: 2 Standard drinks or equivalent per week     Comment: social   • Drug use: No   • Sexual activity: Defer       ALLERGIES  Levofloxacin and Penicillins    REVIEW OF SYSTEMS  Review of Systems   Constitutional: Negative for chills and fever.   HENT: Negative for rhinorrhea and sore throat.    Eyes: Negative for visual disturbance.   Respiratory: Negative for cough and shortness of breath.    Cardiovascular: Positive for chest pain (Burning in nature, lower sternal) and leg swelling (5 days). Negative for palpitations.   Gastrointestinal: Positive for abdominal pain ( Right-sided) and nausea. Negative for diarrhea and vomiting.   Endocrine: Negative.    Genitourinary: Negative for decreased urine volume, dysuria and frequency.   Musculoskeletal: Negative for neck pain.   Skin: Negative for rash.   Neurological: Negative for syncope and headaches.   Psychiatric/Behavioral: Negative.    All other systems reviewed and are negative.      PHYSICAL EXAM  ED Triage Vitals   Temp Heart Rate Resp BP SpO2   08/16/21 1527 08/16/21 1527 08/16/21 1527 08/16/21 1653 08/16/21 1527   97.8 °F (36.6 °C) 77 18 113/80 99 %      Temp src Heart Rate Source Patient Position BP Location FiO2 (%)   -- -- 08/16/21 1653 08/16/21 1653 --     Sitting Left arm        Physical Exam  Vitals and nursing note reviewed.   Constitutional:       General: She is in acute distress (mild).      Appearance:  She is not toxic-appearing.   HENT:      Head: Normocephalic and atraumatic.   Cardiovascular:      Rate and Rhythm: Normal rate and regular rhythm.      Pulses:           Radial pulses are 2+ on the right side and 2+ on the left side.        Dorsalis pedis pulses are 2+ on the right side and 2+ on the left side.        Posterior tibial pulses are 2+ on the right side and 2+ on the left side.      Heart sounds: Normal heart sounds. No murmur heard.        Comments: Trace edema bilateral lower extremities  Pulmonary:      Effort: Pulmonary effort is normal. No respiratory distress.      Breath sounds: Normal breath sounds.   Abdominal:      General: Bowel sounds are normal.      Palpations: Abdomen is soft.      Tenderness: There is abdominal tenderness ( Minimal) in the epigastric area. There is no guarding or rebound.   Musculoskeletal:         General: Normal range of motion.      Cervical back: Normal range of motion and neck supple.   Skin:     General: Skin is warm and dry.      Capillary Refill: Capillary refill takes less than 2 seconds.   Neurological:      Mental Status: She is alert and oriented to person, place, and time.   Psychiatric:         Mood and Affect: Affect normal.         LAB RESULTS  Lab Results (last 24 hours)     Procedure Component Value Units Date/Time    CBC & Differential [[id number]]  (Normal) Collected: 08/16/21 1701    Specimen: Blood Updated: 08/16/21 1716    Narrative:      The following orders were created for panel order CBC & Differential.  Procedure                               Abnormality         Status                     ---------                               -----------         ------                     CBC Auto Differential[[id number]]        Normal              Final result                 Please view results for these tests on the individual orders.    Comprehensive Metabolic Panel [[id number]] Collected: 08/16/21 1701    Specimen: Blood Updated: 08/16/21 4870      Glucose 89 mg/dL      BUN 8 mg/dL      Creatinine 0.96 mg/dL      Sodium 140 mmol/L      Potassium 3.9 mmol/L      Chloride 106 mmol/L      CO2 24.2 mmol/L      Calcium 9.1 mg/dL      Total Protein 6.9 g/dL      Albumin 4.00 g/dL      ALT (SGPT) 24 U/L      AST (SGOT) 16 U/L      Alkaline Phosphatase 79 U/L      Total Bilirubin 0.9 mg/dL      eGFR Non African Amer 63 mL/min/1.73      Globulin 2.9 gm/dL      A/G Ratio 1.4 g/dL      BUN/Creatinine Ratio 8.3     Anion Gap 9.8 mmol/L     Narrative:      GFR Normal >60  Chronic Kidney Disease <60  Kidney Failure <15      CBC Auto Differential [[id number]]  (Normal) Collected: 08/16/21 1701    Specimen: Blood Updated: 08/16/21 1716     WBC 7.38 10*3/mm3      RBC 4.69 10*6/mm3      Hemoglobin 13.6 g/dL      Hematocrit 42.3 %      MCV 90.2 fL      MCH 29.0 pg      MCHC 32.2 g/dL      RDW 13.2 %      RDW-SD 43.2 fl      MPV 9.1 fL      Platelets 217 10*3/mm3      Neutrophil % 62.3 %      Lymphocyte % 28.7 %      Monocyte % 6.6 %      Eosinophil % 1.6 %      Basophil % 0.5 %      Immature Grans % 0.3 %      Neutrophils, Absolute 4.59 10*3/mm3      Lymphocytes, Absolute 2.12 10*3/mm3      Monocytes, Absolute 0.49 10*3/mm3      Eosinophils, Absolute 0.12 10*3/mm3      Basophils, Absolute 0.04 10*3/mm3      Immature Grans, Absolute 0.02 10*3/mm3      nRBC 0.0 /100 WBC     Lipase [[id number]]  (Normal) Collected: 08/16/21 1701    Specimen: Blood Updated: 08/16/21 2118     Lipase 25 U/L     Troponin [[id number]]  (Normal) Collected: 08/16/21 1701    Specimen: Blood Updated: 08/16/21 2121     Troponin T <0.010 ng/mL     Narrative:      Troponin T Reference Range:  <= 0.03 ng/mL-   Negative for AMI  >0.03 ng/mL-     Abnormal for myocardial necrosis.  Clinicians would have to utilize clinical acumen, EKG, Troponin and serial changes to determine if it is an Acute Myocardial Infarction or myocardial injury due to an underlying chronic condition.       Results may be falsely decreased if  patient taking Biotin.      POC Glucose Once [[id number]]  (Normal) Collected: 08/16/21 1704    Specimen: Blood Updated: 08/16/21 1705     Glucose 88 mg/dL      Comment: Meter: [id number] : [id number] Hussein Rowell RN       Urinalysis With Microscopic If Indicated (No Culture) - Urine, Clean Catch [[id number]]  (Abnormal) Collected: 08/16/21 2137    Specimen: Urine, Clean Catch Updated: 08/16/21 2151     Color, UA Yellow     Appearance, UA Clear     pH, UA 6.5     Specific Gravity, UA 1.019     Glucose, UA Negative     Ketones, UA Trace     Bilirubin, UA Negative     Blood, UA Negative     Protein, UA Negative     Leuk Esterase, UA Trace     Nitrite, UA Negative     Urobilinogen, UA 1.0 E.U./dL    Urinalysis, Microscopic Only - Urine, Clean Catch [[id number]]  (Abnormal) Collected: 08/16/21 2137    Specimen: Urine, Clean Catch Updated: 08/16/21 2154     RBC, UA 0-2 /HPF      WBC, UA 0-2 /HPF      Bacteria, UA 1+ /HPF      Squamous Epithelial Cells, UA 7-12 /HPF      Hyaline Casts, UA 3-6 /LPF      Methodology Automated Microscopy          I ordered the above labs and reviewed the results    RADIOLOGY  US Gallbladder   Final Result         Electronically signed by Matrín Atkins M.D. on 08-16-21 at 2232      XR Chest 1 View   Final Result       No active disease in the chest       This report was finalized on 8/16/2021 8:59 PM by Dr. Stephen Pina M.D.               I ordered the above noted radiological studies. Interpreted by radiologist. Viewed by me in PACS.       PROCEDURES  Procedures      PROGRESS AND CONSULTS  ED Course as of Aug 17 0232   Mon Aug 16, 2021   1837 LLE venous doppler negative for DVT per Chantel Vessels, Ultrasound technician    [KA]      ED Course User Index  [KA] Iris Bennett PA     EKG          EKG time: 2046  Rhythm/Rate: Sinus rhythm, rate in the 50s  P waves and NJ: Normal P waves, normal SYDNIE  QRS, axis: Right bundle branch pattern  ST and T waves: Nonspecific ST/T wave  findings    Interpreted Contemporaneously by me, independently viewed  unchanged compared to prior 10/23/2020    Heart score 3    MEDICAL DECISION MAKING  Results were reviewed/discussed with the patient and they were also made aware of online access. Pt also made aware that some labs, such as cultures, will not be resulted during ER visit and follow up with PMD is necessary.       MDM       DIAGNOSIS  Final diagnoses:   Atypical chest pain   Abdominal pain, unspecified abdominal location   Pedal edema       DISPOSITION  DISCHARGE    Patient discharged in stable condition.    Reviewed implications of results, diagnosis, meds, responsibility to follow up, warning signs and symptoms of possible worsening, potential complications and reasons to return to ER, including worsening pain, fever, persistent vomiting, chest pain, shortness of air or other concerns    Patient/Family voiced understanding of above instructions.    Discussed plan for discharge, as there is no emergent indication for admission. Patient referred to primary care provider for BP management due to today's BP. Pt/family is agreeable and understands need for follow up and repeat testing.  Pt is aware that discharge does not mean that nothing is wrong but it indicates no emergency is present that requires admission and they must continue care with follow-up as given below or physician of their choice.     FOLLOW-UP  Juan Gaitan MD  [address] [address]  [phone]    Schedule an appointment as soon as possible for a visit in 3 days  EVEN IF WELL         Medication List      New Prescriptions    dicyclomine 20 MG tablet  Commonly known as: BENTYL  Take 1 tablet by mouth Every 6 (Six) Hours As Needed (Abdominal cramping).           Where to Get Your Medications      These medications were sent to [facility]/pharmacy #[id number] - [place], [place] - [address] - [phone]  - [phone]  [address]  [place] [address]    Hours: 24-hours Phone: [phone]   · dicyclomine 20 MG tablet           Latest Documented Vital Signs:  As of 02:32 EDT  BP- 91/60 HR- 67 Temp- 97.8 °F (36.6 °C) O2 sat- 100%    --  Patient was wearing facemask when I entered the room and throughout our encounter. Full protective equipment was worn throughout this patient encounter including a face mask, eye protection and gloves. Hand hygiene was performed before donning protective equipment and after removal when leaving the room.      Nereida Mane MD  08/17/21 0233

## 2021-08-18 ENCOUNTER — PATIENT MESSAGE (OUTPATIENT)
Dept: SURGERY | Facility: CLINIC | Age: 46
End: 2021-08-18

## 2021-08-19 ENCOUNTER — TRANSCRIBE ORDERS (OUTPATIENT)
Dept: SLEEP MEDICINE | Facility: HOSPITAL | Age: 46
End: 2021-08-19

## 2021-08-19 DIAGNOSIS — Z01.818 OTHER SPECIFIED PRE-OPERATIVE EXAMINATION: Primary | ICD-10-CM

## 2021-08-24 ENCOUNTER — OFFICE VISIT (OUTPATIENT)
Dept: SURGERY | Facility: CLINIC | Age: 46
End: 2021-08-24

## 2021-08-24 VITALS — BODY MASS INDEX: 40.48 KG/M2 | WEIGHT: 243 LBS | HEIGHT: 65 IN

## 2021-08-24 DIAGNOSIS — K80.10 CALCULUS OF GALLBLADDER WITH CHRONIC CHOLECYSTITIS WITHOUT OBSTRUCTION: Primary | ICD-10-CM

## 2021-08-24 PROCEDURE — 99213 OFFICE O/P EST LOW 20 MIN: CPT | Performed by: SURGERY

## 2021-08-24 NOTE — PROGRESS NOTES
Subjective   Jessica Mota is a 46 y.o. female who returns to the office for postprandial abdominal pain.    History of Present Illness     The patient had a previous gastric sleeve and lost a significant amount of weight.  She began developing GERD as well as upper abdominal pain about 30 minutes after eating.  She had a right upper quadrant ultrasound performed that showed gallstones.  She was seen in the office in August 2020 and advised to have a cholecystectomy.  She elected not to have surgery and now returns because her symptoms are progressively worsening.  She has almost constant nausea but no vomiting.  She also has GERD.  She is scheduled to have an EGD by Dr. Hook.    Review of Systems   Constitutional: Negative for fatigue and fever.   HENT: Negative for trouble swallowing and voice change.    Respiratory: Negative for chest tightness and shortness of breath.    Cardiovascular: Negative for chest pain and palpitations.   Gastrointestinal: Positive for abdominal pain and nausea. Negative for blood in stool, constipation, diarrhea and vomiting.   Psychiatric/Behavioral: Negative for agitation and confusion.     Past Medical History:   Diagnosis Date   • Anxiety    • Asthma     EXERCISE INDUCED ASTHMA    • GERD (gastroesophageal reflux disease)    • Heartburn    • Hirsutism    • History of cellulitis    • Joint pain     GENERALIZED   • PCOS (polycystic ovarian syndrome)    • PE (pulmonary thromboembolism) (CMS/HCC) 2018   • Plantar fasciitis    • Sleep apnea     NOT CURRENTLY USING MACHINE,NEW SLEEP STUDY IN SEPT.   • Thyroid nodule    • Urinary urgency      Past Surgical History:   Procedure Laterality Date   • DILATATION AND CURETTAGE     • ECTOPIC PREGNANCY     • ENDOSCOPY N/A 4/9/2019    Procedure: ESOPHAGOGASTRODUODENOSCOPY WITH BIOPSY;  Surgeon: Stevan Hook Jr., MD;  Location: [facility] ENDOSCOPY;  Service: General   • ENDOSCOPY N/A 6/26/2020    Procedure: ESOPHAGOGASTRODUODENOSCOPY WITH  biopsy and balloon DILATATION 18, 19, 20;  Surgeon: Stevan Hook Jr., MD;  Location: [facility] ENDOSCOPY;  Service: General;  Laterality: N/A;  pre: dysphagia and GERD  post: gastritis and esophagitis   • FOOT SURGERY     • GASTRIC SLEEVE LAPAROSCOPIC N/A 5/20/2019    Procedure: GASTRIC SLEEVE LAPAROSCOPIC AND HITAL HERNIA REPAIR;  Surgeon: Stevan Hook Jr., MD;  Location: [facility] OR [facility] [place];  Service: Bariatric   • HYSTERECTOMY     • SALPINGECTOMY     • TUBAL ABDOMINAL LIGATION     • WISDOM TOOTH EXTRACTION       Family History   Problem Relation Age of Onset   • Diabetes Mother    • Hypertension Mother    • Pancreatic cancer Mother    • Hypertension Father    • Malig Hyperthermia Neg Hx      Social History     Socioeconomic History   • Marital status: Single     Spouse name: Not on file   • Number of children: 2   • Years of education: Not on file   • Highest education level: Not on file   Tobacco Use   • Smoking status: Former Smoker     Years: 15.00     Types: Cigarettes   • Smokeless tobacco: Never Used   • Tobacco comment: QUIT 2009   Vaping Use   • Vaping Use: Never used   Substance and Sexual Activity   • Alcohol use: Yes     Alcohol/week: 2.0 standard drinks     Types: 2 Standard drinks or equivalent per week     Comment: social   • Drug use: No   • Sexual activity: Defer       Objective   Physical Exam  Constitutional:       Appearance: Normal appearance. She is well-developed. She is not toxic-appearing.   Eyes:      General: No scleral icterus.  Pulmonary:      Effort: Pulmonary effort is normal. No respiratory distress.   Abdominal:      Palpations: Abdomen is soft.      Tenderness: There is no abdominal tenderness.   Skin:     General: Skin is warm and dry.   Neurological:      Mental Status: She is alert and oriented to person, place, and time.   Psychiatric:         Behavior: Behavior normal.         Thought Content: Thought content normal.         Judgment: Judgment normal.          Assessment/Plan       The encounter diagnosis was Calculus of gallbladder with chronic cholecystitis without obstruction.    The patient has known gallstones and has frequent postprandial abdominal pain.  Her symptoms are likely related to symptomatic cholelithiasis with chronic cholecystitis.  This was discussed with her.  She will undergo the EGD to make sure there is no other diagnoses and, if this is normal, she will contact our office to proceed with a laparoscopic cholecystectomy with intraoperative cholangiogram.

## 2021-09-17 ENCOUNTER — OFFICE VISIT (OUTPATIENT)
Dept: GASTROENTEROLOGY | Facility: CLINIC | Age: 46
End: 2021-09-17

## 2021-09-17 ENCOUNTER — PATIENT ROUNDING (BHMG ONLY) (OUTPATIENT)
Dept: GASTROENTEROLOGY | Facility: CLINIC | Age: 46
End: 2021-09-17

## 2021-09-17 VITALS — BODY MASS INDEX: 40.32 KG/M2 | WEIGHT: 242 LBS | TEMPERATURE: 97.7 F | HEIGHT: 65 IN

## 2021-09-17 DIAGNOSIS — Z98.84 S/P LAPAROSCOPIC SLEEVE GASTRECTOMY: ICD-10-CM

## 2021-09-17 DIAGNOSIS — R10.13 DYSPEPSIA: Primary | ICD-10-CM

## 2021-09-17 DIAGNOSIS — Z12.11 SCREENING FOR COLON CANCER: ICD-10-CM

## 2021-09-17 DIAGNOSIS — R10.13 EPIGASTRIC PAIN: ICD-10-CM

## 2021-09-17 DIAGNOSIS — K80.10 CALCULUS OF GALLBLADDER WITH CHRONIC CHOLECYSTITIS WITHOUT OBSTRUCTION: ICD-10-CM

## 2021-09-17 PROCEDURE — 99214 OFFICE O/P EST MOD 30 MIN: CPT | Performed by: NURSE PRACTITIONER

## 2021-09-17 NOTE — PROGRESS NOTES
September 17, 2021    Hello, may I speak with Jessica Mota?    My name is Vinny Rdz, I am the practice manager with [facility]  [facility] [address] [address] [phone].    Before we get started may I verify your date of birth? 1975    I am calling to officially welcome you to our practice and ask about your recent visit. Is this a good time to talk? yes    Tell me about your visit with us. What things went well?  The visit was a great visit. The staff and [facility] team was amazing.       We're always looking for ways to make our patients' experiences even better. Do you have recommendations on ways we may improve?  no; Cant think of anything at the moment.    Overall were you satisfied with your first visit to our practice? yes       I appreciate you taking the time to speak with me today. Is there anything else I can do for you? no      Thank you, and have a great day.

## 2021-09-17 NOTE — PROGRESS NOTES
Chief Complaint   Patient presents with   • Dyspepsia       HPI    Jessica Mota is a  46 y.o. female here to establish care as a new patient for complaints of dyspepsia.    This patient will also follow with Dr. Cheung.    This patient has been seen as recently as last month by Dr. Callahan with recommendations to have cholecystectomy secondary to gallstones but patient has been putting off surgery.  She also follows with Dr. Hook for history of gastric sleeve.  She has been recommended to have an EGD with Dr. Hook but has been putting this off as well due to lack of transportation.  She has a follow-up with Dr. Hook September 28.  She complains of frequent dyspepsia, mild nausea, but no vomiting.  Intermittent right upper quadrant abdominal pain and left upper quadrant abdominal pain.  Seems to correlate with eating.  She started Protonix 40 mg once daily 2 days ago.    She struggles with chronic constipation.  She has not tried anything to improve her bowel pattern.  Bowels move on average every 3 days.  No rectal bleeding.  She is never had a colonoscopy.  She prefers stool based testing for screening methods such as Cologuard.  Denies family history of colon cancer or colon polyps.    Recent CBC and LFTs normal.    Past Medical History:   Diagnosis Date   • Anxiety    • Asthma     EXERCISE INDUCED ASTHMA    • GERD (gastroesophageal reflux disease)    • Heartburn    • Hirsutism    • History of cellulitis    • Joint pain     GENERALIZED   • PCOS (polycystic ovarian syndrome)    • PE (pulmonary thromboembolism) (CMS/HCC) 2018   • Plantar fasciitis    • Sleep apnea     NOT CURRENTLY USING MACHINE,NEW SLEEP STUDY IN SEPT.   • Thyroid nodule    • Urinary urgency        Past Surgical History:   Procedure Laterality Date   • DILATATION AND CURETTAGE     • ECTOPIC PREGNANCY     • ENDOSCOPY N/A 4/9/2019    Procedure: ESOPHAGOGASTRODUODENOSCOPY WITH BIOPSY;  Surgeon: Stevan Hook Jr., MD;  Location: [facility]  ENDOSCOPY;  Service: General   • ENDOSCOPY N/A 6/26/2020    Procedure: ESOPHAGOGASTRODUODENOSCOPY WITH biopsy and balloon DILATATION 18, 19, 20;  Surgeon: Stevan Hook Jr., MD;  Location: [facility] ENDOSCOPY;  Service: General;  Laterality: N/A;  pre: dysphagia and GERD  post: gastritis and esophagitis   • FOOT SURGERY     • GASTRIC SLEEVE LAPAROSCOPIC N/A 5/20/2019    Procedure: GASTRIC SLEEVE LAPAROSCOPIC AND HITAL HERNIA REPAIR;  Surgeon: Stevan Hook Jr., MD;  Location: [facility] OR [facility] [place];  Service: Bariatric   • HYSTERECTOMY     • SALPINGECTOMY     • TUBAL ABDOMINAL LIGATION     • WISDOM TOOTH EXTRACTION         Scheduled Meds:     Continuous Infusions: No current facility-administered medications for this visit.      PRN Meds:     Allergies   Allergen Reactions   • Levofloxacin Nausea Only   • Penicillins Rash       Social History     Socioeconomic History   • Marital status: Single     Spouse name: Not on file   • Number of children: 2   • Years of education: Not on file   • Highest education level: Not on file   Tobacco Use   • Smoking status: Former Smoker     Years: 15.00     Types: Cigarettes   • Smokeless tobacco: Never Used   • Tobacco comment: QUIT 2009   Vaping Use   • Vaping Use: Never used   Substance and Sexual Activity   • Alcohol use: Yes     Alcohol/week: 2.0 standard drinks     Types: 2 Standard drinks or equivalent per week     Comment: social   • Drug use: No   • Sexual activity: Defer       Family History   Problem Relation Age of Onset   • Diabetes Mother    • Hypertension Mother    • Pancreatic cancer Mother    • Hypertension Father    • Malig Hyperthermia Neg Hx        Review of Systems   Constitutional: Negative for activity change, appetite change, fatigue and unexpected weight change.   HENT: Negative for trouble swallowing.    Eyes: Negative.    Respiratory: Negative.    Cardiovascular: Negative.    Gastrointestinal: Negative for abdominal distention, abdominal pain, anal  bleeding, blood in stool, constipation, diarrhea, nausea, rectal pain and vomiting.   Endocrine: Negative.    Genitourinary: Negative.    Musculoskeletal: Negative.    Allergic/Immunologic: Negative.    Neurological: Negative.    Hematological: Negative.    Psychiatric/Behavioral: Negative.        Vitals:    09/17/21 1426   Temp: 97.7 °F (36.5 °C)       Physical Exam  Constitutional:       Appearance: She is well-developed. She is obese.   HENT:      Head: Normocephalic.      Nose: Nose normal.   Eyes:      Pupils: Pupils are equal, round, and reactive to light.   Cardiovascular:      Rate and Rhythm: Normal rate and regular rhythm.      Heart sounds: Normal heart sounds.   Pulmonary:      Effort: Pulmonary effort is normal. No respiratory distress.      Breath sounds: Normal breath sounds. No wheezing.   Abdominal:      General: Bowel sounds are normal. There is no distension.      Palpations: Abdomen is soft. There is no mass.      Tenderness: There is no abdominal tenderness. There is no guarding or rebound.      Hernia: No hernia is present.   Musculoskeletal:         General: Normal range of motion.      Cervical back: Normal range of motion.   Skin:     General: Skin is warm and dry.      Capillary Refill: Capillary refill takes less than 2 seconds.   Neurological:      Mental Status: She is alert and oriented to person, place, and time.   Psychiatric:         Behavior: Behavior normal.     Assessment    1. Dyspepsia  - FL Upper GI With Air Contrast; Future    2. Epigastric pain  - FL Upper GI With Air Contrast; Future    3. Screening for colon cancer  - Cologuard - Stool, Per Rectum    4. Calculus of gallbladder with chronic cholecystitis without obstruction    5.  History of gastric sleeve follows with Dr. Miladys Ortiz    Jose would certainly benefit from a bidirectional endoscopic evaluation but she has no transportation to procedures.  We discussed performing an upper GI series in lieu of an EGD and she  is amendable.  Continue PPI therapy.  Strict antireflux diet.  Patient prefers Cologuard for cancer screening purposes.  She denies risk factors such as family history of colon cancer or family history of colon polyps.  Trial of Linzess 72 mcg once daily to improve bowel pattern.  Keep follow-up with Dr. Hook.  If upper GI series unremarkable and patient symptoms do not resolve on PPI therapy then I would recommend she return to Dr. Callahan to discuss having her gallbladder removed.         MAX Cerrato  [facility]  [address] [place] [address]  Office: [phone]

## 2021-10-20 ENCOUNTER — TELEPHONE (OUTPATIENT)
Dept: GASTROENTEROLOGY | Facility: CLINIC | Age: 46
End: 2021-10-20

## 2021-11-09 ENCOUNTER — TRANSCRIBE ORDERS (OUTPATIENT)
Dept: BARIATRICS/WEIGHT MGMT | Facility: CLINIC | Age: 46
End: 2021-11-09

## 2021-11-09 DIAGNOSIS — Z01.818 OTHER SPECIFIED PRE-OPERATIVE EXAMINATION: Primary | ICD-10-CM

## 2021-11-10 ENCOUNTER — LAB (OUTPATIENT)
Dept: LAB | Facility: HOSPITAL | Age: 46
End: 2021-11-10

## 2021-11-10 DIAGNOSIS — Z01.818 OTHER SPECIFIED PRE-OPERATIVE EXAMINATION: ICD-10-CM

## 2021-11-10 LAB — SARS-COV-2 ORF1AB RESP QL NAA+PROBE: NOT DETECTED

## 2021-11-10 PROCEDURE — U0004 COV-19 TEST NON-CDC HGH THRU: HCPCS

## 2021-11-10 PROCEDURE — C9803 HOPD COVID-19 SPEC COLLECT: HCPCS

## 2021-11-10 RX ORDER — OMEPRAZOLE 20 MG/1
20 CAPSULE, DELAYED RELEASE ORAL DAILY
COMMUNITY
End: 2021-11-11 | Stop reason: HOSPADM

## 2021-11-11 ENCOUNTER — ANESTHESIA EVENT (OUTPATIENT)
Dept: GASTROENTEROLOGY | Facility: HOSPITAL | Age: 46
End: 2021-11-11

## 2021-11-11 ENCOUNTER — HOSPITAL ENCOUNTER (OUTPATIENT)
Facility: HOSPITAL | Age: 46
Setting detail: HOSPITAL OUTPATIENT SURGERY
Discharge: HOME | End: 2021-11-11
Attending: SURGERY | Admitting: SURGERY

## 2021-11-11 ENCOUNTER — ANESTHESIA (OUTPATIENT)
Dept: GASTROENTEROLOGY | Facility: HOSPITAL | Age: 46
End: 2021-11-11

## 2021-11-11 VITALS
SYSTOLIC BLOOD PRESSURE: 125 MMHG | DIASTOLIC BLOOD PRESSURE: 93 MMHG | OXYGEN SATURATION: 99 % | TEMPERATURE: 98.5 F | RESPIRATION RATE: 16 BRPM | HEIGHT: 65 IN | WEIGHT: 250 LBS | HEART RATE: 72 BPM | BODY MASS INDEX: 41.65 KG/M2

## 2021-11-11 DIAGNOSIS — Z71.3 DIETARY COUNSELING: ICD-10-CM

## 2021-11-11 DIAGNOSIS — Z98.84 S/P LAPAROSCOPIC SLEEVE GASTRECTOMY: ICD-10-CM

## 2021-11-11 DIAGNOSIS — E66.9 OBESITY, CLASS II, BMI 35-39.9: ICD-10-CM

## 2021-11-11 DIAGNOSIS — K21.00 GASTROESOPHAGEAL REFLUX DISEASE WITH ESOPHAGITIS WITHOUT HEMORRHAGE: ICD-10-CM

## 2021-11-11 DIAGNOSIS — K80.10 CALCULUS OF GALLBLADDER WITH CHRONIC CHOLECYSTITIS WITHOUT OBSTRUCTION: ICD-10-CM

## 2021-11-11 DIAGNOSIS — R13.19 OTHER DYSPHAGIA: ICD-10-CM

## 2021-11-11 PROCEDURE — 87081 CULTURE SCREEN ONLY: CPT | Performed by: SURGERY

## 2021-11-11 PROCEDURE — 43239 EGD BIOPSY SINGLE/MULTIPLE: CPT | Performed by: SURGERY

## 2021-11-11 PROCEDURE — C1726 CATH, BAL DIL, NON-VASCULAR: HCPCS | Performed by: SURGERY

## 2021-11-11 PROCEDURE — 25010000002 ONDANSETRON PER 1 MG: Performed by: SURGERY

## 2021-11-11 PROCEDURE — 88305 TISSUE EXAM BY PATHOLOGIST: CPT | Performed by: SURGERY

## 2021-11-11 PROCEDURE — 43245 EGD DILATE STRICTURE: CPT | Performed by: SURGERY

## 2021-11-11 PROCEDURE — 25010000002 PROPOFOL 10 MG/ML EMULSION: Performed by: ANESTHESIOLOGY

## 2021-11-11 RX ORDER — METOCLOPRAMIDE 10 MG/1
10 TABLET ORAL
Qty: 120 TABLET | Refills: 0 | Status: SHIPPED | OUTPATIENT
Start: 2021-11-11

## 2021-11-11 RX ORDER — PROPOFOL 10 MG/ML
VIAL (ML) INTRAVENOUS CONTINUOUS PRN
Status: DISCONTINUED | OUTPATIENT
Start: 2021-11-11 | End: 2021-11-11 | Stop reason: SURG

## 2021-11-11 RX ORDER — ONDANSETRON 2 MG/ML
4 INJECTION INTRAMUSCULAR; INTRAVENOUS ONCE
Status: COMPLETED | OUTPATIENT
Start: 2021-11-11 | End: 2021-11-11

## 2021-11-11 RX ORDER — PROPOFOL 10 MG/ML
VIAL (ML) INTRAVENOUS AS NEEDED
Status: DISCONTINUED | OUTPATIENT
Start: 2021-11-11 | End: 2021-11-11 | Stop reason: SURG

## 2021-11-11 RX ORDER — SUCRALFATE ORAL 1 G/10ML
1 SUSPENSION ORAL 4 TIMES DAILY
Qty: 420 ML | Refills: 0 | Status: SHIPPED | OUTPATIENT
Start: 2021-11-11

## 2021-11-11 RX ORDER — SODIUM CHLORIDE, SODIUM LACTATE, POTASSIUM CHLORIDE, CALCIUM CHLORIDE 600; 310; 30; 20 MG/100ML; MG/100ML; MG/100ML; MG/100ML
30 INJECTION, SOLUTION INTRAVENOUS CONTINUOUS
Status: DISCONTINUED | OUTPATIENT
Start: 2021-11-11 | End: 2021-11-11 | Stop reason: HOSPADM

## 2021-11-11 RX ORDER — LIDOCAINE HYDROCHLORIDE 20 MG/ML
INJECTION, SOLUTION INFILTRATION; PERINEURAL AS NEEDED
Status: DISCONTINUED | OUTPATIENT
Start: 2021-11-11 | End: 2021-11-11 | Stop reason: SURG

## 2021-11-11 RX ORDER — PANTOPRAZOLE SODIUM 40 MG/1
40 TABLET, DELAYED RELEASE ORAL DAILY
Qty: 30 TABLET | Refills: 5 | Status: SHIPPED | OUTPATIENT
Start: 2021-11-11

## 2021-11-11 RX ADMIN — ONDANSETRON 4 MG: 2 INJECTION INTRAMUSCULAR; INTRAVENOUS at 10:14

## 2021-11-11 RX ADMIN — SODIUM CHLORIDE, POTASSIUM CHLORIDE, SODIUM LACTATE AND CALCIUM CHLORIDE 30 ML/HR: 600; 310; 30; 20 INJECTION, SOLUTION INTRAVENOUS at 09:44

## 2021-11-11 RX ADMIN — LIDOCAINE HYDROCHLORIDE 60 MG: 20 INJECTION, SOLUTION INFILTRATION; PERINEURAL at 10:01

## 2021-11-11 RX ADMIN — PROPOFOL 30 MG: 10 INJECTION, EMULSION INTRAVENOUS at 10:03

## 2021-11-11 RX ADMIN — PROPOFOL 150 MG: 10 INJECTION, EMULSION INTRAVENOUS at 10:01

## 2021-11-11 RX ADMIN — Medication 200 MCG/KG/MIN: at 10:01

## 2021-11-11 NOTE — ANESTHESIA PREPROCEDURE EVALUATION
Anesthesia Evaluation     Patient summary reviewed and Nursing notes reviewed   NPO Solid Status: > 8 hours  NPO Liquid Status: > 2 hours           Airway   Mallampati: III  TM distance: >3 FB  Neck ROM: full  Possible difficult intubation  Dental - normal exam     Pulmonary - normal exam   (+) pulmonary embolism, a smoker Former, asthma,sleep apnea,   Cardiovascular     Rhythm: regular  Rate: abnormal      PE comment: SB/rate 50    Neuro/Psych  (+) psychiatric history Anxiety and Depression,     GI/Hepatic/Renal/Endo    (+) obesity, morbid obesity, GERD,      ROS Comment: Hx gastric sleeve    Musculoskeletal     Abdominal   (+) obese,    Substance History      OB/GYN          Other                          Anesthesia Plan    ASA 3     MAC     intravenous induction     Anesthetic plan, all risks, benefits, and alternatives have been provided, discussed and informed consent has been obtained with: patient.

## 2021-11-11 NOTE — ANESTHESIA POSTPROCEDURE EVALUATION
"Patient: Jessica Mota    Procedure Summary     Date: 11/11/21 Room / [facility]:  [facility]    Anesthesia Start: 0956 Anesthesia Stop: 1014    Procedure: ESOPHAGOGASTRODUODENOSCOPY WITH DILATATION 18-20mm (N/A Esophagus) Diagnosis:       Gastroesophageal reflux disease with esophagitis without hemorrhage      Other dysphagia      S/P laparoscopic sleeve gastrectomy      Obesity, Class II, BMI 35-39.9      Dietary counseling      Calculus of gallbladder with chronic cholecystitis without obstruction      (Gastroesophageal reflux disease with esophagitis without hemorrhage [K21.00])      (Other dysphagia [R13.19])      (S/P laparoscopic sleeve gastrectomy [Z98.84])      (Obesity, Class II, BMI 35-39.9 [E66.9])      (Dietary counseling [Z71.3])      (Calculus of gallbladder with chronic cholecystitis without obstruction [K80.10])    Surgeons: Stevan Hook Jr., MD Provider: Chaitanya Louis MD    Anesthesia Type: MAC ASA Status: 3          Anesthesia Type: MAC    Vitals  Vitals Value Taken Time   /93 11/11/21 1030   Temp     Pulse 72 11/11/21 1030   Resp 16 11/11/21 1030   SpO2 99 % 11/11/21 1030           Post Anesthesia Care and Evaluation    Level of consciousness: awake and alert  Pain management: adequate  Anesthetic complications: No anesthetic complications    Cardiovascular status: acceptable  Respiratory status: acceptable  Hydration status: acceptable    Comments: /93 (BP Location: Left arm, Patient Position: Sitting)   Pulse 72   Temp 36.9 °C (98.5 °F) (Oral)   Resp 16   Ht 165.1 cm (65\")   Wt 113 kg (250 lb)   SpO2 99%   BMI 41.60 kg/m²         "

## 2021-11-11 NOTE — DISCHARGE INSTRUCTIONS
For the next 24 hours patient needs to be with a responsible adult.    For 24 hours DO NOT drive, operate machinery, appliances, drink alcohol, make important decisions or sign legal documents.    Start with a light or bland diet if you are feeling sick to your stomach otherwise advance to regular diet as tolerated.    Follow recommendations on procedure report if provided by your doctor.    Call Dr Hook for problems [phone]    Problems may include but not limited to: large amounts of bleeding, trouble breathing, repeated vomiting, severe unrelieved pain, fever or chills.

## 2021-11-11 NOTE — H&P
[facility]  [address] [id number] [phone]  [address] [id number] [phone]  Dept: [phone]  8/10/2021        Jessica Mota.  [id number]  [id number]  1975  female        No chief complaint on file.         Post-Op Bariatric Surgery:   Jessica Mota is status post Laparoscopic Sleeve/HH procedure, performed on 5/20/19      HPI:   Today's weight is 111 kg (244 lb) pounds, today's BMI is Body mass index is 40.6 kg/m²., a  loss of 11 pounds since the last visit and weight loss since surgery is 94 pounds. The patient reports a decreased portion size and loss of appetite.       Jessica Mota denies fever chills chest pain shortness of air and reports heartburn and left-sided abdominal pain intermittently after eating     Diet and Exercise: Diet history reviewed and discussed with the patient. Weight loss/gains to date discussed with the patient. The patient states they are eating unknown grams of protein per day. She reports eating 3 meals per day, a typical portion size of 1 cup, eating 2 snacks per day, drinking 5 or more 8-oz. glasses of water per day, no carbonated beverage consumption and exercising regularly.      Supplements: Multivitamin, vitamin D.      Review of Systems   Constitutional: Positive for fatigue.   Gastrointestinal: Positive for abdominal pain, constipation and nausea.   Musculoskeletal: Positive for arthralgias.   All other systems reviewed and are negative.            Patient Active Problem List   Diagnosis   • SHANDA (obstructive sleep apnea)   • Vitamin D insufficiency   • Thyroid nodule   • Pulmonary embolism (CMS/HCC)   • Depression with anxiety   • Edema   • Dietary counseling   • Labile blood pressure   • Multiple joint pain   • Prediabetes   • Obesity, Class II, BMI 35-39.9   • S/P laparoscopic sleeve gastrectomy   • Obesity, Class III, BMI 40-49.9 (morbid  obesity) (CMS/[facility])   • Epigastric discomfort   • Other dysphagia   • Gastroesophageal reflux disease   • Calculus of gallbladder with chronic cholecystitis without obstruction   • Chronic fatigue, unspecified         Medical History        Past Medical History:   Diagnosis Date   • Anxiety     • Asthma       EXERCISE INDUCED ASTHMA    • GERD (gastroesophageal reflux disease)     • Heartburn     • Hirsutism     • History of cellulitis     • Joint pain       GENERALIZED   • PCOS (polycystic ovarian syndrome)     • PE (pulmonary thromboembolism) (CMS/[facility]) 2018   • Plantar fasciitis     • Sleep apnea       NOT CURRENTLY USING MACHINE,NEW SLEEP STUDY IN SEPT.   • Thyroid nodule     • Urinary urgency              The following portions of the patient's history were reviewed and updated as appropriate: allergies, current medications, past family history, past medical history, past social history, past surgical history and problem list.         Vitals:     08/10/21 1536   BP: 122/78   Pulse: 79   Temp: 98 °F (36.7 °C)         Physical Exam  Constitutional:       Appearance: Normal appearance.   HENT:      Head: Normocephalic and atraumatic.   Eyes:      Conjunctiva/sclera: Conjunctivae normal.   Pulmonary:      Effort: Pulmonary effort is normal.   Neurological:      Mental Status: She is alert and oriented to person, place, and time.   Psychiatric:         Mood and Affect: Mood normal.         Behavior: Behavior normal.         Thought Content: Thought content normal.         Judgment: Judgment normal.            Assessment:   Post-op, the patient status post laparoscopic sleeve gastrectomy and hiatal hernia repair May 2019 resolve last month who is gotten off track with her weight loss.  She complains of heartburn as well as intermittent abdominal pain greater on the left side with eating certain foods.  She does have history of gallstones and has appointment see Dr. Callahan this week.  She saw her primary care physician  and scheduled for repeat ultrasound tomorrow as well.  We will proceed with upper endoscopy with dilatation of the pylorus and also evaluate the esophagus.  We will start on Protonix instead of omeprazole and await further results.  I instructed her to take the Metamucil daily to see if this will help with her irritable bowel syndrome with constipation.. The risks and benefits of the procedure were discussed with the patient in detail and all questions were answered.  Possibility of perforation, bleeding, aspiration, anoxic brain injury, respiratory and/or cardiac arrest and death were discussed.  Consent will be signed and witnessed.          Encounter Diagnoses   Name Primary?   • Gastroesophageal reflux disease with esophagitis without hemorrhage Yes   • Other dysphagia     • S/P laparoscopic sleeve gastrectomy     • Obesity, Class II, BMI 35-39.9     • Dietary counseling     • Calculus of gallbladder with chronic cholecystitis without obstruction           Plan:      Encouraged patient to be sure to get plenty of lean protein per day through small frequent meals all with a protein source.   Activity restrictions: none.   Recommended patient be sure to get at least 70 grams of protein per day by eating small, frequent meals all with high lean protein choices. Be sure to limit/cut back on daily carbohydrate intake. Discussed with the patient the recommended amount of water per day to intake- half of body weight in ounces. Reviewed vitamin requirements. Be sure to do routine exercise, 150 minutes per week minimum, including both cardio and strength training.      Instructions / Recommendations: dietary counseling recommended, recommended a daily protein intake of  grams, vitamin supplement(s) recommended, recommended exercising at least 150 minutes per week, behavior modifications recommended and instructed to call the office for concerns, questions, or problems.      The patient was instructed to follow up  in after procedures and testing.      The patient was counseled regarding the above procedure. Total time spent on this encounter was  30 minutes including reviewing previous notes, lab results and face to face time spent with the patient.  The majority of time was spent counseling the patient regarding diet and exercise as well as reviewing their medications and their compliance with the procedure.

## 2021-11-11 NOTE — OP NOTE
Surgeon: Stevan Hook Jr., M.D.    Preoperative Diagnosis: #1 gastroesophageal reflux disease #2 status post laparoscopic sleeve gastrectomy and hiatal hernia repair    Postoperative Diagnosis: #1 gastritis #2 LA grade A esophagitis #3 normal gastric sleeve with tight pylorus    Procedure Performed: Transoral esophagogastroduodenoscopy with 18-20 balloon dilatation of pylorus and cold forcep biopsies of gastric antrum and distal esophagus    Indications: 46-year-old female status post laparoscopic sleeve gastrectomy and hiatal hernia repair with complaints of heartburn.  Patient does take omeprazole 20 mg daily.    Procedure:     The procedure, indications, preparation and potential, patient were explained to the patient, who indicated understanding and signed the corresponding consent forms.  The patient was identified, taken to the endoscopy suite, and placed on the left side down decubitus position.  The patient underwent a MAC anesthesia and was appropriately monitored through the case by the anesthesia personnel using continuous pulse oximetry, blood pressure, and cardiac monitoring.  A bite block was placed.  After adequate IV sedation and using a transoral technique a lubed flexible endoscope was placed in the hypopharynx and advanced to the second portion of the duodenum.  The pylorus was mildly strictured and the scope had to be advanced with some pressure into the duodenum.  The scope was then withdrawn back into the gastric sleeve.  There was no stricture noted in the gastric sleeve unless dictated below.  No polyps or ulcers were seen unless dictated below.  The scope was then withdrawn back into the esophagus.  The Z line was regular and no erosive esophagitis seen unless dictated below.  Scope was then advanced back down into the antrum.  A 18-20 balloon catheter was then advanced across the pylorus and taken up in sequence and each level held for approximately 1 minute.  The catheter was deflated  and removed.  The pylorus dilated up nicely.  The scope was then completely withdrawn after decompressing the stomach.  The patient tolerated the procedure well and left the endoscopy suite in stable condition.    The sleeve was of normal size without stricture or dilatation.  No ulcers noted.  Moderate erythema was noted in the gastric antrum and body of the gastric sleeve.  Cold forcep biopsies taken to rule out Helicobacter pylori.  The Z-line was irregular with 1 year of erythema extending proximally less than 5 mm.  Multiple cold forcep biopsies taken to rule out Jerry's.  No active bleeding noted    Recommendations:     Await biopsy results and will start on Protonix 40 mg daily and DC omeprazole.  Follow-up in the office

## 2021-11-12 LAB
LAB AP CASE REPORT: NORMAL
PATH REPORT.FINAL DX SPEC: NORMAL
PATH REPORT.GROSS SPEC: NORMAL
UREASE TISS QL: NEGATIVE

## 2023-04-28 ENCOUNTER — TELEMEDICINE (OUTPATIENT)
Dept: BARIATRICS/WEIGHT MGMT | Facility: CLINIC | Age: 48
End: 2023-04-28
Payer: COMMERCIAL

## 2023-04-28 VITALS — WEIGHT: 260 LBS | BODY MASS INDEX: 43.32 KG/M2 | HEIGHT: 65 IN

## 2023-04-28 DIAGNOSIS — M25.50 MULTIPLE JOINT PAIN: ICD-10-CM

## 2023-04-28 DIAGNOSIS — E55.9 VITAMIN D INSUFFICIENCY: ICD-10-CM

## 2023-04-28 DIAGNOSIS — Z98.84 S/P LAPAROSCOPIC SLEEVE GASTRECTOMY: ICD-10-CM

## 2023-04-28 DIAGNOSIS — R73.03 PREDIABETES: ICD-10-CM

## 2023-04-28 DIAGNOSIS — E66.01 OBESITY, CLASS III, BMI 40-49.9 (MORBID OBESITY): Primary | ICD-10-CM

## 2023-04-28 DIAGNOSIS — R53.82 CHRONIC FATIGUE, UNSPECIFIED: ICD-10-CM

## 2023-04-28 DIAGNOSIS — Z71.3 DIETARY COUNSELING: ICD-10-CM

## 2023-04-28 DIAGNOSIS — K21.00 GASTROESOPHAGEAL REFLUX DISEASE WITH ESOPHAGITIS WITHOUT HEMORRHAGE: ICD-10-CM

## 2023-04-28 DIAGNOSIS — G47.33 OSA (OBSTRUCTIVE SLEEP APNEA): ICD-10-CM

## 2023-04-28 RX ORDER — FLUTICASONE PROPIONATE 50 MCG
2 SPRAY, SUSPENSION (ML) NASAL DAILY
COMMUNITY
Start: 2023-03-25

## 2023-04-28 RX ORDER — HYDROXYZINE HYDROCHLORIDE 25 MG/1
25 TABLET, FILM COATED ORAL 2 TIMES DAILY PRN
COMMUNITY
Start: 2023-04-04

## 2023-04-28 RX ORDER — OMEPRAZOLE 20 MG/1
20 CAPSULE, DELAYED RELEASE ORAL DAILY
COMMUNITY
Start: 2023-04-20

## 2023-04-28 RX ORDER — ONDANSETRON 4 MG/1
4 TABLET, ORALLY DISINTEGRATING ORAL
COMMUNITY
Start: 2023-01-02

## 2023-04-28 NOTE — PROGRESS NOTES
[facility]  [facility] SURGERY  [address]  [facility] [place] [address]  [phone]  [address]  [facility] [place] [id number] [phone]  Dept: [phone]  4/28/2023      Jessica Mota.  [id number]  [id number]  1975  female      Chief Complaint   Patient presents with   • Follow-up     4 year s/p gastric sleeve       This service was conducted via [employer].  I am located at [facility] at [address], [address].  The patient is located in [place], [place].    You have chosen to receive care through a video visit. Do you consent to use a video visit for your medical care today? Yes    BH Post-Op Bariatric Surgery:   Jessica Mota is status post Laparoscopic Sleeve procedure, performed on 5/20/2019.  Last visit in office 8/10/2021 with c/o reflux.  EGD 11/11/2021 with gastritis, esophagitis, pyloric stricture     HPI:   Today's weight is 118 kg (260 lb) pounds, today's BMI is Body mass index is 43.27 kg/m².,@ has a  gain of 16 pounds since the last visit and@ weight loss since surgery is 78 pounds. The patient reports a decreased portion size and loss of appetite.      Jessica Mota denies nausea, vomiting, dysphagia and reports weight gain and reflux.  Is taking Omeprazole daily for reflux but still has to take tums occasionally.  She recently moved so a lot of her supplements and her food scale are in storage.  States that lots has been going on in her personal life over the last year and she hasn't been as focused on her weight as she would like to be.  Wants to get back on track.    Eats turkey burger, chicken, occasionally toppings off pizza.  Recently she has been eating more bread.  Has noticed her serving size has increased.    Does have either a [employer]life or premier shake daily.  Exercises almost daily now.  Took about a year break from exercise but is now trying to get back on track.  States is  tracking intake on her phone.  Unsure of how many calories/protein/carbs she is getting.     Diet and Exercise: Diet history reviewed and discussed with the patient. Weight loss/gains to date discussed with the patient. The patient states they are eating unknown grams of protein per day. She reports eating 3 meals per day, a typical portion size of 1-1.5 cup, eating 0-1 snacks per day, drinking 5 or more 8-oz. glasses of water per day, no carbonated beverage consumption and exercising regularly.     Supplements: mtv.     Review of Systems   Constitutional: Positive for activity change and appetite change.   Respiratory: Negative for shortness of breath.    Cardiovascular: Negative for chest pain.   Gastrointestinal:        Reflux/heartburn   All other systems reviewed and are negative.      Patient Active Problem List   Diagnosis   • SHANDA (obstructive sleep apnea)   • Vitamin D insufficiency   • Thyroid nodule   • Pulmonary embolism   • Depression with anxiety   • Edema   • Dietary counseling   • Labile blood pressure   • Multiple joint pain   • Prediabetes   • Obesity, Class II, BMI 35-39.9   • S/P laparoscopic sleeve gastrectomy   • Obesity, Class III, BMI 40-49.9 (morbid obesity)   • Epigastric discomfort   • Other dysphagia   • Gastroesophageal reflux disease   • Calculus of gallbladder with chronic cholecystitis without obstruction   • Chronic fatigue, unspecified   • Irritable bowel syndrome with constipation   • Gastroesophageal reflux disease with esophagitis without hemorrhage       Past Medical History:   Diagnosis Date   • Anxiety    • Asthma     EXERCISE INDUCED ASTHMA    • Chronic back pain    • GERD (gastroesophageal reflux disease)    • Heartburn    • Hirsutism    • History of cellulitis    • History of COVID-19    • History of pulmonary embolism 2018   • Joint pain     GENERALIZED   • PCOS (polycystic ovarian syndrome)    • Plantar fasciitis    • Sleep apnea     NOT CURRENTLY USING MACHINE,NEW SLEEP  STUDY IN SEPT.   • Thyroid nodule    • Urinary urgency        The following portions of the patient's history were reviewed and updated as appropriate: allergies, current medications, past medical history, past surgical history and problem list.    There were no vitals filed for this visit.    Physical Exam  Constitutional:       General: She is not in acute distress.     Appearance: Normal appearance.   Pulmonary:      Effort: Pulmonary effort is normal. No respiratory distress.   Neurological:      General: No focal deficit present.      Mental Status: She is alert and oriented to person, place, and time.         Assessment:   Post-op, the patient c/o reflux and weight regain.  Did discuss potential for repeating EGD but she wants to hold off for now.  Thinks that maybe part of the problem is due to weight gain and what she is eating.       Encounter Diagnoses   Name Primary?   • Obesity, Class III, BMI 40-49.9 (morbid obesity) Yes   • S/P laparoscopic sleeve gastrectomy    • Vitamin D insufficiency    • Prediabetes    • Gastroesophageal reflux disease with esophagitis without hemorrhage    • Multiple joint pain    • SHANDA (obstructive sleep apnea)    • Chronic fatigue, unspecified    • Dietary counseling        Plan:   Asked for information regarding getting back on track.  Sent nutrition tool kit and snack ideas through [employer].    Will order labs   call if reflux does not improve and will order scope  Follow up with dietician   Encouraged patient to be sure to get plenty of lean protein per day through small frequent meals all with a protein source.   Activity restrictions: none.   Recommended patient be sure to get at least 70 grams of protein per day by eating small, frequent meals all with high lean protein choices. Be sure to limit/cut back on daily carbohydrate intake. Discussed with the patient the recommended amount of water per day to intake- half of body weight in ounces. Reviewed vitamin requirements.  Be sure to do routine exercise, 150 minutes per week minimum, including both cardio and strength training.     Instructions / Recommendations: dietary counseling recommended, recommended a daily protein intake of  grams, vitamin supplement(s) recommended, recommended exercising at least 150 minutes per week, behavior modifications recommended and instructed to call the office for concerns, questions, or problems.     The patient was instructed to follow up in 6 months or sooner if no improvement .     The patient was counseled regarding. Total time spent face to face was 25 minutes and 20 minutes was spent counseling.   This was an audio and video enabled telemedicine encounter.

## 2023-05-09 ENCOUNTER — LAB (OUTPATIENT)
Dept: LAB | Facility: HOSPITAL | Age: 48
End: 2023-05-09
Payer: COMMERCIAL

## 2023-05-09 DIAGNOSIS — G47.33 OSA (OBSTRUCTIVE SLEEP APNEA): ICD-10-CM

## 2023-05-09 DIAGNOSIS — Z71.3 DIETARY COUNSELING: ICD-10-CM

## 2023-05-09 DIAGNOSIS — E55.9 VITAMIN D INSUFFICIENCY: ICD-10-CM

## 2023-05-09 DIAGNOSIS — K21.00 GASTROESOPHAGEAL REFLUX DISEASE WITH ESOPHAGITIS WITHOUT HEMORRHAGE: ICD-10-CM

## 2023-05-09 DIAGNOSIS — Z98.84 S/P LAPAROSCOPIC SLEEVE GASTRECTOMY: ICD-10-CM

## 2023-05-09 DIAGNOSIS — E66.01 OBESITY, CLASS III, BMI 40-49.9 (MORBID OBESITY): ICD-10-CM

## 2023-05-09 DIAGNOSIS — R73.03 PREDIABETES: ICD-10-CM

## 2023-05-09 DIAGNOSIS — M25.50 MULTIPLE JOINT PAIN: ICD-10-CM

## 2023-05-09 DIAGNOSIS — R53.82 CHRONIC FATIGUE, UNSPECIFIED: ICD-10-CM

## 2023-05-09 LAB
25(OH)D3 SERPL-MCNC: 48.8 NG/ML (ref 30–100)
ALBUMIN SERPL-MCNC: 3.9 G/DL (ref 3.5–5.2)
ALBUMIN/GLOB SERPL: 1.3 G/DL
ALP SERPL-CCNC: 77 U/L (ref 39–117)
ALT SERPL W P-5'-P-CCNC: 15 U/L (ref 1–33)
ANION GAP SERPL CALCULATED.3IONS-SCNC: 8.2 MMOL/L (ref 5–15)
AST SERPL-CCNC: 15 U/L (ref 1–32)
BASOPHILS # BLD AUTO: 0.03 10*3/MM3 (ref 0–0.2)
BASOPHILS NFR BLD AUTO: 0.4 % (ref 0–1.5)
BILIRUB SERPL-MCNC: 0.4 MG/DL (ref 0–1.2)
BUN SERPL-MCNC: 18 MG/DL (ref 6–20)
BUN/CREAT SERPL: 18.4 (ref 7–25)
CALCIUM SPEC-SCNC: 9.4 MG/DL (ref 8.6–10.5)
CHLORIDE SERPL-SCNC: 106 MMOL/L (ref 98–107)
CO2 SERPL-SCNC: 25.8 MMOL/L (ref 22–29)
CREAT SERPL-MCNC: 0.98 MG/DL (ref 0.57–1)
DEPRECATED RDW RBC AUTO: 42.4 FL (ref 37–54)
EGFRCR SERPLBLD CKD-EPI 2021: 71.3 ML/MIN/1.73
EOSINOPHIL # BLD AUTO: 0.22 10*3/MM3 (ref 0–0.4)
EOSINOPHIL NFR BLD AUTO: 3.1 % (ref 0.3–6.2)
ERYTHROCYTE [DISTWIDTH] IN BLOOD BY AUTOMATED COUNT: 13.4 % (ref 12.3–15.4)
FERRITIN SERPL-MCNC: 88.3 NG/ML (ref 13–150)
FOLATE SERPL-MCNC: >20 NG/ML (ref 4.78–24.2)
GLOBULIN UR ELPH-MCNC: 2.9 GM/DL
GLUCOSE SERPL-MCNC: 94 MG/DL (ref 65–99)
HCT VFR BLD AUTO: 37.8 % (ref 34–46.6)
HGB BLD-MCNC: 12.7 G/DL (ref 12–15.9)
IMM GRANULOCYTES # BLD AUTO: 0.01 10*3/MM3 (ref 0–0.05)
IMM GRANULOCYTES NFR BLD AUTO: 0.1 % (ref 0–0.5)
IRON 24H UR-MRATE: 48 MCG/DL (ref 37–145)
LYMPHOCYTES # BLD AUTO: 2.41 10*3/MM3 (ref 0.7–3.1)
LYMPHOCYTES NFR BLD AUTO: 33.6 % (ref 19.6–45.3)
MCH RBC QN AUTO: 29.3 PG (ref 26.6–33)
MCHC RBC AUTO-ENTMCNC: 33.6 G/DL (ref 31.5–35.7)
MCV RBC AUTO: 87.3 FL (ref 79–97)
MONOCYTES # BLD AUTO: 0.41 10*3/MM3 (ref 0.1–0.9)
MONOCYTES NFR BLD AUTO: 5.7 % (ref 5–12)
NEUTROPHILS NFR BLD AUTO: 4.1 10*3/MM3 (ref 1.7–7)
NEUTROPHILS NFR BLD AUTO: 57.1 % (ref 42.7–76)
NRBC BLD AUTO-RTO: 0 /100 WBC (ref 0–0.2)
PLATELET # BLD AUTO: 217 10*3/MM3 (ref 140–450)
PMV BLD AUTO: 8.6 FL (ref 6–12)
POTASSIUM SERPL-SCNC: 4.1 MMOL/L (ref 3.5–5.2)
PREALB SERPL-MCNC: 19.7 MG/DL (ref 20–40)
PROT SERPL-MCNC: 6.8 G/DL (ref 6–8.5)
RBC # BLD AUTO: 4.33 10*6/MM3 (ref 3.77–5.28)
SODIUM SERPL-SCNC: 140 MMOL/L (ref 136–145)
WBC NRBC COR # BLD: 7.18 10*3/MM3 (ref 3.4–10.8)

## 2023-05-09 PROCEDURE — 85025 COMPLETE CBC W/AUTO DIFF WBC: CPT

## 2023-05-09 PROCEDURE — 83540 ASSAY OF IRON: CPT

## 2023-05-09 PROCEDURE — 82306 VITAMIN D 25 HYDROXY: CPT

## 2023-05-09 PROCEDURE — 36415 COLL VENOUS BLD VENIPUNCTURE: CPT

## 2023-05-09 PROCEDURE — 80053 COMPREHEN METABOLIC PANEL: CPT

## 2023-05-09 PROCEDURE — 84134 ASSAY OF PREALBUMIN: CPT

## 2023-05-09 PROCEDURE — 82728 ASSAY OF FERRITIN: CPT

## 2023-05-09 PROCEDURE — 82746 ASSAY OF FOLIC ACID SERUM: CPT

## 2023-05-09 PROCEDURE — 84425 ASSAY OF VITAMIN B-1: CPT

## 2023-05-09 PROCEDURE — 83921 ORGANIC ACID SINGLE QUANT: CPT

## 2023-05-14 LAB — METHYLMALONATE SERPL-SCNC: 128 NMOL/L (ref 0–378)

## 2023-05-15 LAB — VIT B1 BLD-SCNC: 165.8 NMOL/L (ref 66.5–200)

## 2023-06-09 ENCOUNTER — OFFICE VISIT (OUTPATIENT)
Dept: BARIATRICS/WEIGHT MGMT | Facility: CLINIC | Age: 48
End: 2023-06-09
Payer: COMMERCIAL

## 2023-06-09 VITALS
SYSTOLIC BLOOD PRESSURE: 132 MMHG | WEIGHT: 269 LBS | HEART RATE: 59 BPM | HEIGHT: 65 IN | TEMPERATURE: 97.1 F | BODY MASS INDEX: 44.82 KG/M2 | DIASTOLIC BLOOD PRESSURE: 89 MMHG

## 2023-06-09 DIAGNOSIS — E66.01 OBESITY, CLASS III, BMI 40-49.9 (MORBID OBESITY): Primary | ICD-10-CM

## 2023-06-09 DIAGNOSIS — R73.03 PREDIABETES: ICD-10-CM

## 2023-06-09 DIAGNOSIS — G47.33 OSA (OBSTRUCTIVE SLEEP APNEA): ICD-10-CM

## 2023-06-09 DIAGNOSIS — Z71.3 DIETARY COUNSELING: ICD-10-CM

## 2023-06-09 DIAGNOSIS — K21.00 GASTROESOPHAGEAL REFLUX DISEASE WITH ESOPHAGITIS WITHOUT HEMORRHAGE: ICD-10-CM

## 2023-06-09 DIAGNOSIS — R63.5 UNINTENDED WEIGHT GAIN: ICD-10-CM

## 2023-06-09 PROBLEM — E66.812 OBESITY, CLASS II, BMI 35-39.9: Status: RESOLVED | Noted: 2019-05-06 | Resolved: 2023-06-09

## 2023-06-09 PROBLEM — R10.13 EPIGASTRIC DISCOMFORT: Status: RESOLVED | Noted: 2020-03-19 | Resolved: 2023-06-09

## 2023-06-09 PROBLEM — E66.9 OBESITY, CLASS II, BMI 35-39.9: Status: RESOLVED | Noted: 2019-05-06 | Resolved: 2023-06-09

## 2023-06-09 PROBLEM — R13.19 OTHER DYSPHAGIA: Status: RESOLVED | Noted: 2020-06-11 | Resolved: 2023-06-09

## 2023-06-09 NOTE — PROGRESS NOTES
[facility]  [address] [id number] [phone]  [address] [id number] [phone]  Dept: [phone]  6/9/2023      Jessica Mota.  [id number]  [id number]  1975  female      Chief Complaint   Patient presents with    Follow-up     Sleeve follow up        BH Post-Op Bariatric Surgery:   Jessica Mota is status post Laparoscopic Sleeve/HH procedure, performed on 5/20/19     HPI:   Today's weight is 122 kg (269 lb) pounds, today's BMI is Body mass index is 44.76 kg/m²., a  gain of 9 pounds since the last visit and weight loss since surgery is 69 pounds.    Jessica Mota denies nausea vomiting fever chills chest pain shortness of air abdominal pain and reports weight gain and heartburn as well as fatigue and constipation patient taking omeprazole and occasional Tums for her heartburn     Diet and Exercise: Diet history reviewed and discussed with the patient. Weight loss/gains to date discussed with the patient. The patient states they are eating around 40 grams of protein per day. She reports eating 3 meals per day, a typical portion size of 1 cup, eating 1 snacks per day, drinking 5 or more 8-oz. glasses of water per day, no carbonated beverage consumption and exercising regularly.     Supplements: Multivitamin with iron, vitamin D, calcium.     Review of Systems    Patient Active Problem List   Diagnosis    SHANDA (obstructive sleep apnea)    Vitamin D insufficiency    Thyroid nodule    Pulmonary embolism    Depression with anxiety    Edema    Dietary counseling    Labile blood pressure    Multiple joint pain    Prediabetes    S/P laparoscopic sleeve gastrectomy    Obesity, Class III, BMI 40-49.9 (morbid obesity)    Gastroesophageal reflux disease    Calculus of gallbladder with chronic cholecystitis without obstruction    Chronic fatigue, unspecified    Irritable bowel syndrome with constipation     Gastroesophageal reflux disease with esophagitis without hemorrhage    Unintended weight gain       Past Medical History:   Diagnosis Date    Anxiety     Asthma     EXERCISE INDUCED ASTHMA     Chronic back pain     GERD (gastroesophageal reflux disease)     Heartburn     Hirsutism     History of cellulitis     History of COVID-19     History of pulmonary embolism 2018    Joint pain     GENERALIZED    PCOS (polycystic ovarian syndrome)     Plantar fasciitis     Sleep apnea     NOT CURRENTLY USING MACHINE,NEW SLEEP STUDY IN SEPT.    Thyroid nodule     Urinary urgency        The following portions of the patient's history were reviewed and updated as appropriate: allergies, current medications, past family history, past medical history, past social history, past surgical history, and problem list.    Vitals:    06/09/23 0819   BP: 132/89   Pulse: 59   Temp: 97.1 °F (36.2 °C)       Physical Exam    Assessment:   Post-op, the patient status post laparoscopic sleeve gastrectomy and hiatal hernia repair May 2019 who Anusol last month and a half ago to make some changes patient has gained 9 pounds since last visit.  Had long discussion with patient regarding clean eating concentrating on meals only.  Patient does exercise and already has exercise routine down.  She does not drink any of her calories.  She would like to do the meal placement shake diet to reset her sleeve to get back on track with her weight loss.  Plan to lose 20 pounds in 2 months to get below 250.  Her lowest weight was around 240 pounds.  Continue with her omeprazole for her heartburn and hopefully heartburn will improve with weight loss.  We discussed doing upper GI or upper endoscopy but patient would like to wait at this time and will decide at her follow-up appointment.  Did discuss possibly having recurrence of her hiatal hernia which we could surgically repair.  Blood pressure fairly normal.  Complaining also help with her prediabetes..      Encounter Diagnoses   Name Primary?    Obesity, Class III, BMI 40-49.9 (morbid obesity) Yes    Dietary counseling     Unintended weight gain     Prediabetes     Gastroesophageal reflux disease with esophagitis without hemorrhage     SHANDA (obstructive sleep apnea)        Plan:     Encouraged patient to be sure to get plenty of lean protein per day through small meals all with a protein source.   Activity restrictions: none.   Recommended patient be sure to get at least 70 grams of protein per day by eating small  meals all with high lean protein choices. Be sure to limit/cut back on daily carbohydrate intake. Discussed with the patient the recommended amount of water per day to intake- half of body weight in ounces. Reviewed vitamin requirements. Be sure to do routine exercise, 150 minutes per week minimum, including both cardio and strength training.     Instructions / Recommendations: dietary counseling recommended, recommended a daily protein intake of  grams, vitamin supplement(s) recommended, recommended exercising at least 150 minutes per week, behavior modifications recommended and instructed to call the office for concerns, questions, or problems.     The patient was instructed to follow up in 2 months.     The patient was counseled regarding the above procedure. Total time spent on this encounter was  30 minutes including reviewing previous notes, lab results and face to face time spent with the patient.  The majority of time was spent counseling the patient regarding diet and exercise as well as reviewing their medications and their compliance with the procedure.

## 2023-08-30 ENCOUNTER — DOCUMENTATION (OUTPATIENT)
Dept: PHYSICAL THERAPY | Facility: CLINIC | Age: 48
End: 2023-08-30
Payer: MEDICAID

## 2023-12-11 ENCOUNTER — OFFICE VISIT (OUTPATIENT)
Dept: BARIATRICS/WEIGHT MGMT | Facility: CLINIC | Age: 48
End: 2023-12-11
Payer: MEDICAID

## 2023-12-11 VITALS
SYSTOLIC BLOOD PRESSURE: 153 MMHG | HEART RATE: 76 BPM | TEMPERATURE: 98 F | BODY MASS INDEX: 45.82 KG/M2 | RESPIRATION RATE: 18 BRPM | HEIGHT: 65 IN | WEIGHT: 275 LBS | DIASTOLIC BLOOD PRESSURE: 96 MMHG

## 2023-12-11 DIAGNOSIS — R63.5 UNINTENDED WEIGHT GAIN: ICD-10-CM

## 2023-12-11 DIAGNOSIS — K21.00 GASTROESOPHAGEAL REFLUX DISEASE WITH ESOPHAGITIS WITHOUT HEMORRHAGE: ICD-10-CM

## 2023-12-11 DIAGNOSIS — Z71.3 DIETARY COUNSELING: ICD-10-CM

## 2023-12-11 DIAGNOSIS — E66.01 OBESITY, CLASS III, BMI 40-49.9 (MORBID OBESITY): Primary | ICD-10-CM

## 2023-12-11 NOTE — PROGRESS NOTES
"Chief Complaint  No chief complaint on file.    Subjective        Jessica Mota presents to [facility] BARIATR[facility] SURGERY  History of Present Illness  Patient follows up after previous sleeve in 2019--patient had a preoperative weight of 330 pounds, got down to a low of 245 pounds but has been regaining weight and now is 275 pounds, this is a 6 pound weight gain since she was last seen 6 months ago--patient states that she has a new boyfriend has had a lot of medical problems including recently undergoing amputation of his foot due to his diabetes, she states that she has gotten into a codependent relationship where she has not been eating well nor focused on her exercise although she notes that she does do a lot of walking at work--patient takes both omeprazole and Tums for her reflux, but states that she is able to sleep flat on her stomach at night without any major issue  Objective   Vital Signs:  /96   Pulse 76   Temp 98 °F (36.7 °C) (Temporal)   Resp 18   Ht 165.1 cm (65\")   Wt 125 kg (275 lb)   BMI 45.76 kg/m²   Estimated body mass index is 45.76 kg/m² as calculated from the following:    Height as of this encounter: 165.1 cm (65\").    Weight as of this encounter: 125 kg (275 lb).               Physical Exam   Alert, pleasant  No respiratory distress  Abdomen soft  Result Review :                   Assessment and Plan   Diagnoses and all orders for this visit:    1. Obesity, Class III, BMI 40-49.9 (morbid obesity) (Primary)    2. Gastroesophageal reflux disease with esophagitis without hemorrhage    3. Unintended weight gain    4. Dietary counseling    Long discussion with the patient about diet and exercise--we are going to try a \"reset\" diet her for the next month--patient also notes that she has cooking equipment such as her air Fryer and storage right now and needs to get that out--will plan on seeing the patient back in 3 months, if her reflux worsens or she continues to " gain weight may consider upper GI to evaluate degree of reflux as well as size and shape of her gastric sleeve.       I spent 30 minutes caring for Jessica on this date of service. This time includes time spent by me in the following activities:preparing for the visit, reviewing tests, obtaining and/or reviewing a separately obtained history, performing a medically appropriate examination and/or evaluation , counseling and educating the patient/family/caregiver, documenting information in the medical record, and independently interpreting results and communicating that information with the patient/family/caregiver  Follow Up   No follow-ups on file.  Patient was given instructions and counseling regarding her condition or for health maintenance advice. Please see specific information pulled into the AVS if appropriate.

## 2024-01-05 ENCOUNTER — APPOINTMENT (OUTPATIENT)
Dept: GENERAL RADIOLOGY | Facility: HOSPITAL | Age: 49
End: 2024-01-05

## 2024-01-05 ENCOUNTER — HOSPITAL ENCOUNTER (EMERGENCY)
Facility: HOSPITAL | Age: 49
Discharge: HOME | End: 2024-01-05
Attending: EMERGENCY MEDICINE

## 2024-01-05 VITALS
HEIGHT: 65 IN | SYSTOLIC BLOOD PRESSURE: 135 MMHG | HEART RATE: 70 BPM | OXYGEN SATURATION: 100 % | TEMPERATURE: 97.3 F | RESPIRATION RATE: 16 BRPM | WEIGHT: 275 LBS | DIASTOLIC BLOOD PRESSURE: 81 MMHG | BODY MASS INDEX: 45.82 KG/M2

## 2024-01-05 DIAGNOSIS — R07.89 ATYPICAL CHEST PAIN: Primary | ICD-10-CM

## 2024-01-05 LAB
ALBUMIN SERPL-MCNC: 4.2 G/DL (ref 3.5–5.2)
ALBUMIN/GLOB SERPL: 1.8 G/DL
ALP SERPL-CCNC: 107 U/L (ref 39–117)
ALT SERPL W P-5'-P-CCNC: 18 U/L (ref 1–33)
ANION GAP SERPL CALCULATED.3IONS-SCNC: 10 MMOL/L (ref 5–15)
AST SERPL-CCNC: 18 U/L (ref 1–32)
B PARAPERT DNA SPEC QL NAA+PROBE: NOT DETECTED
B PERT DNA SPEC QL NAA+PROBE: NOT DETECTED
BASOPHILS # BLD AUTO: 0.02 10*3/MM3 (ref 0–0.2)
BASOPHILS NFR BLD AUTO: 0.3 % (ref 0–1.5)
BILIRUB SERPL-MCNC: 0.3 MG/DL (ref 0–1.2)
BUN SERPL-MCNC: 15 MG/DL (ref 6–20)
BUN/CREAT SERPL: 17.4 (ref 7–25)
C PNEUM DNA NPH QL NAA+NON-PROBE: NOT DETECTED
CALCIUM SPEC-SCNC: 8.9 MG/DL (ref 8.6–10.5)
CHLORIDE SERPL-SCNC: 105 MMOL/L (ref 98–107)
CO2 SERPL-SCNC: 27 MMOL/L (ref 22–29)
CREAT SERPL-MCNC: 0.86 MG/DL (ref 0.57–1)
D DIMER PPP FEU-MCNC: 0.41 MCGFEU/ML (ref 0–0.5)
DEPRECATED RDW RBC AUTO: 41 FL (ref 37–54)
EGFRCR SERPLBLD CKD-EPI 2021: 83.5 ML/MIN/1.73
EOSINOPHIL # BLD AUTO: 0.23 10*3/MM3 (ref 0–0.4)
EOSINOPHIL NFR BLD AUTO: 3.7 % (ref 0.3–6.2)
ERYTHROCYTE [DISTWIDTH] IN BLOOD BY AUTOMATED COUNT: 13.1 % (ref 12.3–15.4)
FLUAV SUBTYP SPEC NAA+PROBE: NOT DETECTED
FLUBV RNA ISLT QL NAA+PROBE: NOT DETECTED
GEN 5 2HR TROPONIN T REFLEX: 7 NG/L
GLOBULIN UR ELPH-MCNC: 2.4 GM/DL
GLUCOSE SERPL-MCNC: 139 MG/DL (ref 65–99)
HADV DNA SPEC NAA+PROBE: NOT DETECTED
HCOV 229E RNA SPEC QL NAA+PROBE: NOT DETECTED
HCOV HKU1 RNA SPEC QL NAA+PROBE: NOT DETECTED
HCOV NL63 RNA SPEC QL NAA+PROBE: NOT DETECTED
HCOV OC43 RNA SPEC QL NAA+PROBE: NOT DETECTED
HCT VFR BLD AUTO: 36.1 % (ref 34–46.6)
HGB BLD-MCNC: 12.3 G/DL (ref 12–15.9)
HMPV RNA NPH QL NAA+NON-PROBE: NOT DETECTED
HOLD SPECIMEN: NORMAL
HOLD SPECIMEN: NORMAL
HPIV1 RNA ISLT QL NAA+PROBE: NOT DETECTED
HPIV2 RNA SPEC QL NAA+PROBE: NOT DETECTED
HPIV3 RNA NPH QL NAA+PROBE: NOT DETECTED
HPIV4 P GENE NPH QL NAA+PROBE: NOT DETECTED
IMM GRANULOCYTES # BLD AUTO: 0.02 10*3/MM3 (ref 0–0.05)
IMM GRANULOCYTES NFR BLD AUTO: 0.3 % (ref 0–0.5)
LIPASE SERPL-CCNC: 24 U/L (ref 13–60)
LYMPHOCYTES # BLD AUTO: 1.98 10*3/MM3 (ref 0.7–3.1)
LYMPHOCYTES NFR BLD AUTO: 31.8 % (ref 19.6–45.3)
M PNEUMO IGG SER IA-ACNC: NOT DETECTED
MCH RBC QN AUTO: 29.8 PG (ref 26.6–33)
MCHC RBC AUTO-ENTMCNC: 34.1 G/DL (ref 31.5–35.7)
MCV RBC AUTO: 87.4 FL (ref 79–97)
MONOCYTES # BLD AUTO: 0.29 10*3/MM3 (ref 0.1–0.9)
MONOCYTES NFR BLD AUTO: 4.7 % (ref 5–12)
NEUTROPHILS NFR BLD AUTO: 3.68 10*3/MM3 (ref 1.7–7)
NEUTROPHILS NFR BLD AUTO: 59.2 % (ref 42.7–76)
NRBC BLD AUTO-RTO: 0 /100 WBC (ref 0–0.2)
NT-PROBNP SERPL-MCNC: 84.9 PG/ML (ref 0–450)
PLATELET # BLD AUTO: 255 10*3/MM3 (ref 140–450)
PMV BLD AUTO: 8.7 FL (ref 6–12)
POTASSIUM SERPL-SCNC: 3.4 MMOL/L (ref 3.5–5.2)
PROT SERPL-MCNC: 6.6 G/DL (ref 6–8.5)
QT INTERVAL: 399 MS
QTC INTERVAL: 443 MS
RBC # BLD AUTO: 4.13 10*6/MM3 (ref 3.77–5.28)
RHINOVIRUS RNA SPEC NAA+PROBE: NOT DETECTED
RSV RNA NPH QL NAA+NON-PROBE: NOT DETECTED
SARS-COV-2 RNA NPH QL NAA+NON-PROBE: NOT DETECTED
SODIUM SERPL-SCNC: 142 MMOL/L (ref 136–145)
TROPONIN T DELTA: 0 NG/L
TROPONIN T SERPL HS-MCNC: 7 NG/L
WBC NRBC COR # BLD AUTO: 6.22 10*3/MM3 (ref 3.4–10.8)
WHOLE BLOOD HOLD COAG: NORMAL
WHOLE BLOOD HOLD SPECIMEN: NORMAL

## 2024-01-05 PROCEDURE — 71045 X-RAY EXAM CHEST 1 VIEW: CPT

## 2024-01-05 PROCEDURE — 84484 ASSAY OF TROPONIN QUANT: CPT

## 2024-01-05 PROCEDURE — 83880 ASSAY OF NATRIURETIC PEPTIDE: CPT | Performed by: EMERGENCY MEDICINE

## 2024-01-05 PROCEDURE — 93005 ELECTROCARDIOGRAM TRACING: CPT

## 2024-01-05 PROCEDURE — 83690 ASSAY OF LIPASE: CPT | Performed by: EMERGENCY MEDICINE

## 2024-01-05 PROCEDURE — 0202U NFCT DS 22 TRGT SARS-COV-2: CPT | Performed by: EMERGENCY MEDICINE

## 2024-01-05 PROCEDURE — 85379 FIBRIN DEGRADATION QUANT: CPT | Performed by: EMERGENCY MEDICINE

## 2024-01-05 PROCEDURE — 99284 EMERGENCY DEPT VISIT MOD MDM: CPT

## 2024-01-05 PROCEDURE — 80053 COMPREHEN METABOLIC PANEL: CPT

## 2024-01-05 PROCEDURE — 85025 COMPLETE CBC W/AUTO DIFF WBC: CPT

## 2024-01-05 PROCEDURE — 36415 COLL VENOUS BLD VENIPUNCTURE: CPT

## 2024-01-05 RX ORDER — SODIUM CHLORIDE 0.9 % (FLUSH) 0.9 %
10 SYRINGE (ML) INJECTION AS NEEDED
Status: DISCONTINUED | OUTPATIENT
Start: 2024-01-05 | End: 2024-01-05 | Stop reason: HOSPADM

## 2024-01-05 RX ORDER — ASPIRIN 325 MG
325 TABLET ORAL ONCE
Status: DISCONTINUED | OUTPATIENT
Start: 2024-01-05 | End: 2024-01-05 | Stop reason: HOSPADM

## 2024-01-05 NOTE — Clinical Note
[facility] EMERGENCY DEPARTMENT  [address] [facility] [address]  Phone: [phone]    Jessica Mota was seen and treated in our emergency department on 1/5/2024.  She may return to work on 01/08/2024.         Thank you for choosing [facility].    Alexi Cornejo RN

## 2024-01-05 NOTE — ED PROVIDER NOTES
EMERGENCY DEPARTMENT ENCOUNTER    Room Number:  28/28  PCP: Juan Gaitan MD  Historian: Patient      HPI:  Chief Complaint: Chest pain shortness of breath  A complete HPI/ROS/PMH/PSH/SH/FH are unobtainable due to: None  Context: Jessica Mota is a 48 y.o. female who presents to the ED c/o chest pain shortness of breath.  Patient states she has significant digestive issues.  Patient states she has been having chest pain for a couple days.  Seems to be getting worse.  Increasing shortness of breath.  Patient states pain middle chest goes to the left.  Has had no pleuritic symptoms.  Has had some mild swelling in both legs.  Has had cough and congestion.  Patient has had no fevers or chills.            PAST MEDICAL HISTORY  Active Ambulatory Problems     Diagnosis Date Noted    SHANDA (obstructive sleep apnea) 12/11/2018    Vitamin D insufficiency 11/24/2014    Thyroid nodule 12/11/2018    Pulmonary embolism 06/07/2018    Depression with anxiety 12/11/2018    Edema 12/11/2018    Dietary counseling 12/11/2018    Labile blood pressure 12/11/2018    Multiple joint pain 12/11/2018    Prediabetes 12/11/2018    S/P laparoscopic sleeve gastrectomy 08/21/2019    Obesity, Class III, BMI 40-49.9 (morbid obesity) 03/19/2020    Gastroesophageal reflux disease 06/12/2020    Calculus of gallbladder with chronic cholecystitis without obstruction 08/25/2020    Chronic fatigue, unspecified 06/22/2021    Irritable bowel syndrome with constipation 08/10/2021    Gastroesophageal reflux disease with esophagitis without hemorrhage 11/11/2021    Unintended weight gain 06/09/2023     Resolved Ambulatory Problems     Diagnosis Date Noted    Morbid obesity with BMI of 50.0-59.9, adult 11/20/2014    Heartburn 12/11/2018    Obesity, Class II, BMI 35-39.9 05/06/2019    Body mass index (BMI) of 50-59.9 in adult 05/20/2019    Epigastric discomfort 03/19/2020    Esophageal reflux 03/19/2020    Other dysphagia 06/11/2020     Past Medical History:    Diagnosis Date    Anxiety     Asthma     Chronic back pain     GERD (gastroesophageal reflux disease)     Hirsutism     History of cellulitis     History of COVID-19     History of pulmonary embolism 2018    Joint pain     PCOS (polycystic ovarian syndrome)     Plantar fasciitis     Sleep apnea     Urinary urgency          PAST SURGICAL HISTORY  Past Surgical History:   Procedure Laterality Date    DILATATION AND CURETTAGE      ECTOPIC PREGNANCY      ENDOSCOPY N/A 4/9/2019    Procedure: ESOPHAGOGASTRODUODENOSCOPY WITH BIOPSY;  Surgeon: Stevan Hook Jr., MD;  Location: [facility] ENDOSCOPY;  Service: General    ENDOSCOPY N/A 6/26/2020    Procedure: ESOPHAGOGASTRODUODENOSCOPY WITH biopsy and balloon DILATATION 18, 19, 20;  Surgeon: Stevan Hook Jr., MD;  Location: [facility] ENDOSCOPY;  Service: General;  Laterality: N/A;  pre: dysphagia and GERD  post: gastritis and esophagitis    ENDOSCOPY N/A 11/11/2021    Procedure: ESOPHAGOGASTRODUODENOSCOPY WITH DILATATION 18-20mm;  Surgeon: Stevan Hook Jr., MD;  Location: [facility] ENDOSCOPY;  Service: General;  Laterality: N/A;  Pre: GERD, h/x of gastric sleeve  Post: esophagitis, gastritis, valve dilation    FOOT SURGERY      GASTRIC SLEEVE LAPAROSCOPIC N/A 5/20/2019    Procedure: GASTRIC SLEEVE LAPAROSCOPIC AND HITAL HERNIA REPAIR;  Surgeon: Stevan Hook Jr., MD;  Location: [facility] OR [facility];  Service: Bariatric    HYSTERECTOMY      SALPINGECTOMY      TUBAL ABDOMINAL LIGATION      WISDOM TOOTH EXTRACTION           FAMILY HISTORY  Family History   Problem Relation Age of Onset    Diabetes Mother     Hypertension Mother     Pancreatic cancer Mother     Cancer Mother     Hypertension Father     Malig Hyperthermia Neg Hx          SOCIAL HISTORY  Social History     Socioeconomic History    Marital status: Single    Number of children: 2   Tobacco Use    Smoking status: Former     Years: 15     Types: Cigarettes     Quit date: 2009     Years since quitting: 15.0     Smokeless tobacco: Never   Vaping Use    Vaping Use: Never used   Substance and Sexual Activity    Alcohol use: Yes     Alcohol/week: 2.0 standard drinks of alcohol     Types: 2 Standard drinks or equivalent per week     Comment: social    Drug use: No    Sexual activity: Defer         ALLERGIES  Levofloxacin and Penicillins        REVIEW OF SYSTEMS  Review of Systems   Chest pain shortness of breath      PHYSICAL EXAM  ED Triage Vitals [01/05/24 1413]   Temp Heart Rate Resp BP SpO2   97.3 °F (36.3 °C) 84 18 -- 100 %      Temp src Heart Rate Source Patient Position BP Location FiO2 (%)   Tympanic Monitor -- -- --       Physical Exam      GENERAL: no acute distress  HENT: nares patent  EYES: no scleral icterus  CV: regular rhythm, normal rate  RESPIRATORY: normal effort  ABDOMEN: soft. Nontender  MUSCULOSKELETAL: no deformity  NEURO: alert, moves all extremities, follows commands  PSYCH:  calm, cooperative  SKIN: warm, dry    Vital signs and nursing notes reviewed.          LAB RESULTS  Recent Results (from the past 24 hour(s))   ECG 12 Lead ED Triage Standing Order; Chest Pain    Collection Time: 01/05/24  2:20 PM   Result Value Ref Range    QT Interval 399 ms    QTC Interval 443 ms   Comprehensive Metabolic Panel    Collection Time: 01/05/24  2:35 PM    Specimen: Blood   Result Value Ref Range    Glucose 139 (H) 65 - 99 mg/dL    BUN 15 6 - 20 mg/dL    Creatinine 0.86 0.57 - 1.00 mg/dL    Sodium 142 136 - 145 mmol/L    Potassium 3.4 (L) 3.5 - 5.2 mmol/L    Chloride 105 98 - 107 mmol/L    CO2 27.0 22.0 - 29.0 mmol/L    Calcium 8.9 8.6 - 10.5 mg/dL    Total Protein 6.6 6.0 - 8.5 g/dL    Albumin 4.2 3.5 - 5.2 g/dL    ALT (SGPT) 18 1 - 33 U/L    AST (SGOT) 18 1 - 32 U/L    Alkaline Phosphatase 107 39 - 117 U/L    Total Bilirubin 0.3 0.0 - 1.2 mg/dL    Globulin 2.4 gm/dL    A/G Ratio 1.8 g/dL    BUN/Creatinine Ratio 17.4 7.0 - 25.0    Anion Gap 10.0 5.0 - 15.0 mmol/L    eGFR 83.5 >60.0 mL/min/1.73   High Sensitivity  Troponin T    Collection Time: 01/05/24  2:35 PM    Specimen: Blood   Result Value Ref Range    HS Troponin T 7 <14 ng/L   Green Top (Gel)    Collection Time: 01/05/24  2:35 PM   Result Value Ref Range    Extra Tube Hold for add-ons.    Lavender Top    Collection Time: 01/05/24  2:35 PM   Result Value Ref Range    Extra Tube hold for add-on    Gold Top - SST    Collection Time: 01/05/24  2:35 PM   Result Value Ref Range    Extra Tube Hold for add-ons.    Light Blue Top    Collection Time: 01/05/24  2:35 PM   Result Value Ref Range    Extra Tube Hold for add-ons.    CBC Auto Differential    Collection Time: 01/05/24  2:35 PM    Specimen: Blood   Result Value Ref Range    WBC 6.22 3.40 - 10.80 10*3/mm3    RBC 4.13 3.77 - 5.28 10*6/mm3    Hemoglobin 12.3 12.0 - 15.9 g/dL    Hematocrit 36.1 34.0 - 46.6 %    MCV 87.4 79.0 - 97.0 fL    MCH 29.8 26.6 - 33.0 pg    MCHC 34.1 31.5 - 35.7 g/dL    RDW 13.1 12.3 - 15.4 %    RDW-SD 41.0 37.0 - 54.0 fl    MPV 8.7 6.0 - 12.0 fL    Platelets 255 140 - 450 10*3/mm3    Neutrophil % 59.2 42.7 - 76.0 %    Lymphocyte % 31.8 19.6 - 45.3 %    Monocyte % 4.7 (L) 5.0 - 12.0 %    Eosinophil % 3.7 0.3 - 6.2 %    Basophil % 0.3 0.0 - 1.5 %    Immature Grans % 0.3 0.0 - 0.5 %    Neutrophils, Absolute 3.68 1.70 - 7.00 10*3/mm3    Lymphocytes, Absolute 1.98 0.70 - 3.10 10*3/mm3    Monocytes, Absolute 0.29 0.10 - 0.90 10*3/mm3    Eosinophils, Absolute 0.23 0.00 - 0.40 10*3/mm3    Basophils, Absolute 0.02 0.00 - 0.20 10*3/mm3    Immature Grans, Absolute 0.02 0.00 - 0.05 10*3/mm3    nRBC 0.0 0.0 - 0.2 /100 WBC   D-dimer, Quantitative    Collection Time: 01/05/24  2:35 PM    Specimen: Blood   Result Value Ref Range    D-Dimer, Quantitative 0.41 0.00 - 0.50 MCGFEU/mL   Lipase    Collection Time: 01/05/24  2:35 PM    Specimen: Blood   Result Value Ref Range    Lipase 24 13 - 60 U/L   BNP    Collection Time: 01/05/24  2:35 PM    Specimen: Blood   Result Value Ref Range    proBNP 84.9 0.0 - 450.0 pg/mL    Respiratory Panel PCR w/COVID-19(SARS-CoV-2) [facility] In-House, NP Swab in UTM/VTM, 2 HR TAT - Swab, Nasopharynx    Collection Time: 01/05/24  2:43 PM    Specimen: Nasopharynx; Swab   Result Value Ref Range    ADENOVIRUS, PCR Not Detected Not Detected    Coronavirus 229E Not Detected Not Detected    Coronavirus HKU1 Not Detected Not Detected    Coronavirus NL63 Not Detected Not Detected    Coronavirus OC43 Not Detected Not Detected    COVID19 Not Detected Not Detected - Ref. Range    Human Metapneumovirus Not Detected Not Detected    Human Rhinovirus/Enterovirus Not Detected Not Detected    Influenza A PCR Not Detected Not Detected    Influenza B PCR Not Detected Not Detected    Parainfluenza Virus 1 Not Detected Not Detected    Parainfluenza Virus 2 Not Detected Not Detected    Parainfluenza Virus 3 Not Detected Not Detected    Parainfluenza Virus 4 Not Detected Not Detected    RSV, PCR Not Detected Not Detected    Bordetella pertussis pcr Not Detected Not Detected    Bordetella parapertussis PCR Not Detected Not Detected    Chlamydophila pneumoniae PCR Not Detected Not Detected    Mycoplasma pneumo by PCR Not Detected Not Detected   High Sensitivity Troponin T 2Hr    Collection Time: 01/05/24  4:52 PM    Specimen: Blood   Result Value Ref Range    HS Troponin T 7 <14 ng/L    Troponin T Delta 0 >=-4 - <+4 ng/L       Ordered the above labs and reviewed the results.        RADIOLOGY  XR Chest 1 View    Result Date: 1/5/2024  XR CHEST 1 VW-  HISTORY: Female who is 48 years-old, chest pain  TECHNIQUE: Frontal view of the chest  COMPARISON: 8/16/2021  FINDINGS: Heart, mediastinum and pulmonary vasculature are unremarkable. No focal pulmonary consolidation, pleural effusion, or pneumothorax. No acute osseous process.      No evidence for acute pulmonary process. Follow-up as clinical indications persist.  This report was finalized on 1/5/2024 3:02 PM by Dr. London Prieto M.D on Workstation: [id number]        Ordered the above noted radiological studies.  Chest x-ray independently interpreted by me and shows no evidence of pneumonia          PROCEDURES  Procedures      EKG          EKG time: 1420  Rhythm/Rate: Normal sinus rhythm 74  P waves and NJ: Normal P waves  QRS, axis: Right bundle branch block  ST and T waves: Nonspecific ST-T wave    Interpreted Contemporaneously by me, independently viewed  Unchanged compared to prior 8/16/2021      MEDICATIONS GIVEN IN ER  Medications   sodium chloride 0.9 % flush 10 mL (has no administration in time range)   aspirin tablet 325 mg (325 mg Oral Not Given 1/5/24 1507)                   MEDICAL DECISION MAKING, PROGRESS, and CONSULTS     Discussion below represents my analysis of pertinent findings related to patient's condition, differential diagnosis, treatment plan and final disposition.      Additional sources:  - Discussed/ obtained information from independent historians: None    - External (non-ED) record review: Epic reviewed patient seen in bariatrics office 12/11/2023 for reflux    - Chronic or social conditions impacting care: None    - Shared decision making: Discussed options with patient and we will be discharging her home.  She understands to return for any concerns.  Understands there is diagnostic uncertainty.  She will follow-up with [place] cardiology      Orders placed during this visit:  Orders Placed This Encounter   Procedures    Respiratory Panel PCR w/COVID-19(SARS-CoV-2) [facility] In-House, NP Swab in UTM/VTM, 2 HR TAT - Swab, Nasopharynx    XR Chest 1 View    [facility] Draw    Comprehensive Metabolic Panel    High Sensitivity Troponin T    CBC Auto Differential    D-dimer, Quantitative    Lipase    BNP    High Sensitivity Troponin T 2Hr    NPO Diet NPO Type: Strict NPO    Undress & Gown    Continuous Pulse Oximetry    Oxygen Therapy- Nasal Cannula; Titrate 1-6 LPM Per SpO2; 90 - 95%    ECG 12 Lead ED Triage Standing Order; Chest Pain     ECG 12 Lead ED Triage Standing Order; Chest Pain    Insert Peripheral IV    CBC & Differential    Green Top (Gel)    Lavender Top    Gold Top - SST    Light Blue Top         Additional orders considered but not ordered:  None        Differential diagnosis includes but is not limited to:    Reflux versus chest wall pain versus ACS      Independent interpretation of labs, radiology studies, and discussions with consultants:  ED Course as of 01/05/24 1812 Fri Jan 05, 2024 1807 18:07 EST  Patient states she has been having chest pain for a while.  Patient's D-dimer negative so doubt pulmonary embolism.  Patient's chest x-ray negative so does not have evidence of pneumonia.  Patient has EKG that is nonacute and has serial troponins that are both normal.  Not anemic.  Discussed options with patient and she states she thinks this is more GI.  We have discussed options and patient will be discharged home.  She will take it easy over the weekend and follow-up with [place] cardiology.  Patient understands there is diagnostic uncertainty and she is to return here for any concerns. Heart score 2 [SL]      ED Course User Index  [SL] Denys Garcia MD                 DIAGNOSIS  Final diagnoses:   Atypical chest pain         DISPOSITION  DISCHARGE    Patient discharged in stable condition.    Reviewed implications of results, diagnosis, meds, responsibility to follow up, warning signs and symptoms of possible worsening, potential complications and reasons to return to ER, including worsening symptoms    Patient/Family voiced understanding of above instructions.    Discussed plan for discharge, as there is no emergent indication for admission. Patient referred to primary care provider for BP management due to today's BP. Pt/family is agreeable and understands need for follow up and repeat testing.  Pt is aware that discharge does not mean that nothing is wrong but it indicates no emergency is present that requires  admission and they must continue care with follow-up as given below or physician of their choice.     FOLLOW-UP  Juan Gaitan MD  [address]  [address]  [facility] [id number] [phone]    Schedule an appointment as soon as possible for a visit       [facility] CARDIOLOGY  [address]  [address]  [facility] [address] [phone]  Schedule an appointment as soon as possible for a visit            Medication List      No changes were made to your prescriptions during this visit.                  Latest Documented Vital Signs:  As of 18:12 EST  BP- 145/97 HR- 64 Temp- 97.3 °F (36.3 °C) (Tympanic) O2 sat- 99%              --    Please note that portions of this were completed with a voice recognition program.       Note Disclaimer: At [facility], we believe that sharing information builds trust and better relationships. You are receiving this note because you are receiving care at [facility] or recently visited. It is possible you will see health information before a provider has talked with you about it. This kind of information can be easy to misunderstand. To help you fully understand what it means for your health, we urge you to discuss this note with your provider.            Denys Garcia MD  01/05/24 1669

## 2024-01-08 ENCOUNTER — TELEPHONE (OUTPATIENT)
Dept: BARIATRICS/WEIGHT MGMT | Facility: CLINIC | Age: 49
End: 2024-01-08
Payer: MEDICAID

## 2024-01-29 ENCOUNTER — PREP FOR SURGERY (OUTPATIENT)
Dept: OTHER | Facility: HOSPITAL | Age: 49
End: 2024-01-29
Payer: MEDICAID

## 2024-01-29 DIAGNOSIS — R13.19 OTHER DYSPHAGIA: Primary | ICD-10-CM

## 2024-01-29 RX ORDER — SODIUM CHLORIDE, SODIUM LACTATE, POTASSIUM CHLORIDE, CALCIUM CHLORIDE 600; 310; 30; 20 MG/100ML; MG/100ML; MG/100ML; MG/100ML
30 INJECTION, SOLUTION INTRAVENOUS CONTINUOUS
OUTPATIENT
Start: 2024-01-29

## 2024-02-01 PROBLEM — R13.19 OTHER DYSPHAGIA: Status: ACTIVE | Noted: 2024-01-29

## 2024-04-02 ENCOUNTER — ANESTHESIA (OUTPATIENT)
Dept: GASTROENTEROLOGY | Facility: HOSPITAL | Age: 49
End: 2024-04-02
Payer: COMMERCIAL

## 2024-04-02 ENCOUNTER — ANESTHESIA EVENT (OUTPATIENT)
Dept: GASTROENTEROLOGY | Facility: HOSPITAL | Age: 49
End: 2024-04-02
Payer: COMMERCIAL

## 2024-04-02 ENCOUNTER — HOSPITAL ENCOUNTER (OUTPATIENT)
Facility: HOSPITAL | Age: 49
Setting detail: HOSPITAL OUTPATIENT SURGERY
Discharge: HOME | End: 2024-04-02
Attending: SURGERY | Admitting: SURGERY
Payer: COMMERCIAL

## 2024-04-02 VITALS
RESPIRATION RATE: 16 BRPM | OXYGEN SATURATION: 99 % | DIASTOLIC BLOOD PRESSURE: 87 MMHG | HEIGHT: 65 IN | WEIGHT: 291 LBS | SYSTOLIC BLOOD PRESSURE: 132 MMHG | BODY MASS INDEX: 48.48 KG/M2 | HEART RATE: 61 BPM

## 2024-04-02 DIAGNOSIS — R13.19 OTHER DYSPHAGIA: ICD-10-CM

## 2024-04-02 PROCEDURE — 87081 CULTURE SCREEN ONLY: CPT | Performed by: SURGERY

## 2024-04-02 PROCEDURE — 94761 N-INVAS EAR/PLS OXIMETRY MLT: CPT

## 2024-04-02 PROCEDURE — 88305 TISSUE EXAM BY PATHOLOGIST: CPT | Performed by: SURGERY

## 2024-04-02 PROCEDURE — S0260 H&P FOR SURGERY: HCPCS | Performed by: SURGERY

## 2024-04-02 PROCEDURE — 25010000002 PROPOFOL 10 MG/ML EMULSION: Performed by: NURSE ANESTHETIST, CERTIFIED REGISTERED

## 2024-04-02 PROCEDURE — 94799 UNLISTED PULMONARY SVC/PX: CPT

## 2024-04-02 PROCEDURE — C1726 CATH, BAL DIL, NON-VASCULAR: HCPCS | Performed by: SURGERY

## 2024-04-02 PROCEDURE — 43245 EGD DILATE STRICTURE: CPT | Performed by: SURGERY

## 2024-04-02 PROCEDURE — 94640 AIRWAY INHALATION TREATMENT: CPT

## 2024-04-02 PROCEDURE — 25810000003 LACTATED RINGERS PER 1000 ML: Performed by: SURGERY

## 2024-04-02 PROCEDURE — 43239 EGD BIOPSY SINGLE/MULTIPLE: CPT | Performed by: SURGERY

## 2024-04-02 RX ORDER — SODIUM CHLORIDE, SODIUM LACTATE, POTASSIUM CHLORIDE, CALCIUM CHLORIDE 600; 310; 30; 20 MG/100ML; MG/100ML; MG/100ML; MG/100ML
1000 INJECTION, SOLUTION INTRAVENOUS CONTINUOUS
Status: DISCONTINUED | OUTPATIENT
Start: 2024-04-02 | End: 2024-04-02 | Stop reason: HOSPADM

## 2024-04-02 RX ORDER — IPRATROPIUM BROMIDE AND ALBUTEROL SULFATE 2.5; .5 MG/3ML; MG/3ML
3 SOLUTION RESPIRATORY (INHALATION)
Status: DISCONTINUED | OUTPATIENT
Start: 2024-04-02 | End: 2024-04-02

## 2024-04-02 RX ORDER — PANTOPRAZOLE SODIUM 40 MG/1
40 TABLET, DELAYED RELEASE ORAL DAILY
Qty: 30 TABLET | Refills: 5 | Status: SHIPPED | OUTPATIENT
Start: 2024-04-02

## 2024-04-02 RX ORDER — LIDOCAINE HYDROCHLORIDE 20 MG/ML
INJECTION, SOLUTION INFILTRATION; PERINEURAL AS NEEDED
Status: DISCONTINUED | OUTPATIENT
Start: 2024-04-02 | End: 2024-04-02 | Stop reason: SURG

## 2024-04-02 RX ORDER — SUCRALFATE 1 G/1
1 TABLET ORAL 3 TIMES DAILY
Qty: 90 TABLET | Refills: 1 | Status: SHIPPED | OUTPATIENT
Start: 2024-04-02

## 2024-04-02 RX ORDER — SODIUM CHLORIDE, SODIUM LACTATE, POTASSIUM CHLORIDE, CALCIUM CHLORIDE 600; 310; 30; 20 MG/100ML; MG/100ML; MG/100ML; MG/100ML
30 INJECTION, SOLUTION INTRAVENOUS CONTINUOUS
Status: DISCONTINUED | OUTPATIENT
Start: 2024-04-02 | End: 2024-04-02

## 2024-04-02 RX ORDER — PROPOFOL 10 MG/ML
VIAL (ML) INTRAVENOUS AS NEEDED
Status: DISCONTINUED | OUTPATIENT
Start: 2024-04-02 | End: 2024-04-02 | Stop reason: SURG

## 2024-04-02 RX ORDER — IPRATROPIUM BROMIDE AND ALBUTEROL SULFATE 2.5; .5 MG/3ML; MG/3ML
3 SOLUTION RESPIRATORY (INHALATION) ONCE
Status: COMPLETED | OUTPATIENT
Start: 2024-04-02 | End: 2024-04-02

## 2024-04-02 RX ADMIN — PROPOFOL 50 MG: 10 INJECTION, EMULSION INTRAVENOUS at 12:31

## 2024-04-02 RX ADMIN — SODIUM CHLORIDE, POTASSIUM CHLORIDE, SODIUM LACTATE AND CALCIUM CHLORIDE 1000 ML: 600; 310; 30; 20 INJECTION, SOLUTION INTRAVENOUS at 12:11

## 2024-04-02 RX ADMIN — PROPOFOL 200 MG: 10 INJECTION, EMULSION INTRAVENOUS at 12:29

## 2024-04-02 RX ADMIN — LIDOCAINE HYDROCHLORIDE 50 MG: 20 INJECTION, SOLUTION INFILTRATION; PERINEURAL at 12:30

## 2024-04-02 RX ADMIN — IPRATROPIUM BROMIDE AND ALBUTEROL SULFATE 3 ML: .5; 3 SOLUTION RESPIRATORY (INHALATION) at 13:10

## 2024-04-02 RX ADMIN — PROPOFOL 50 MG: 10 INJECTION, EMULSION INTRAVENOUS at 12:30

## 2024-04-02 NOTE — ANESTHESIA PREPROCEDURE EVALUATION
Anesthesia Evaluation     Patient summary reviewed and Nursing notes reviewed   history of anesthetic complications:  prolonged sedation               Airway   Mallampati: II  TM distance: >3 FB  Neck ROM: full  No difficulty expected  Dental      Pulmonary    (+) pulmonary embolism, a smoker Former, asthma,sleep apnea  Cardiovascular - negative cardio ROS    ECG reviewed  Rhythm: regular  Rate: normal        Neuro/Psych  (+) psychiatric history Anxiety and Depression  GI/Hepatic/Renal/Endo    (+) morbid obesity, GERD, thyroid problem hypothyroidism    Musculoskeletal (-) negative ROS    Abdominal    Substance History - negative use     OB/GYN negative ob/gyn ROS         Other                      Anesthesia Plan    ASA 3     MAC     (Incomplete RBBB)  intravenous induction     Anesthetic plan, risks, benefits, and alternatives have been provided, discussed and informed consent has been obtained with: patient.    CODE STATUS:

## 2024-04-02 NOTE — OP NOTE
Surgeon: Stevan Hook Jr., M.D.    Preoperative Diagnosis: #1 GERD #2 history of gastric sleeve and hiatal hernia repair    Postoperative Diagnosis: #1 gastritis #2 LA grade B esophagitis    Procedure Performed: Transoral esophagogastroduodenoscopy with 18-20 balloon dilatation of pylorus    Indications: 49-year-old female status post laparoscopic sleeve gastrectomy and hiatal hernia repair 2019 with complaints of heartburn.  Patient does take PPI.    Procedure:     The procedure, indications, preparation and potential, patient were explained to the patient, who indicated understanding and signed the corresponding consent forms.  The patient was identified, taken to the endoscopy suite, and placed on the left side down decubitus position.  The patient underwent a MAC anesthesia and was appropriately monitored through the case by the anesthesia personnel using continuous pulse oximetry, blood pressure, and cardiac monitoring.  A bite block was placed.  After adequate IV sedation and using a transoral technique a lubed flexible endoscope was placed in the hypopharynx and advanced to the second portion of the duodenum.  The pylorus was mildly strictured and the scope had to be advanced with some pressure into the duodenum.  The scope was then withdrawn back into the gastric sleeve.  There was no stricture noted in the gastric sleeve unless dictated below.  No polyps or ulcers were seen unless dictated below.  The scope was then withdrawn back into the esophagus.  The Z line was regular and no erosive esophagitis seen unless dictated below.  Scope was then advanced back down into the antrum.  A 18-20 balloon catheter was then advanced across the pylorus and taken up in sequence and each level held for approximately 1 minute.  The catheter was deflated and removed.  The pylorus dilated up nicely.  The scope was then completely withdrawn after decompressing the stomach.  The patient tolerated the procedure well and  left the endoscopy suite in stable condition.    The sleeve was of normal size without stricture or dilatation.  The pylorus opened up nicely after dilatation.  Cold forcep biopsies taken gastric antrum to rule out Helicobacter pylori.  Z-line was irregular with 2 areas of erythema extending proximally greater than 5 mm.  Multiple cold present biopsies taken to rule out Jerry's.  No active bleeding noted.    Recommendations:     Continue with PPI and add Carafate.  Follow-up in the office and await biopsy results

## 2024-04-02 NOTE — DISCHARGE INSTRUCTIONS
For the next 24 hours patient needs to be with a responsible adult.    For 24 hours DO NOT drive, operate machinery, appliances, drink alcohol, make important decisions or sign legal documents.    Start with a light or bland diet if you are feeling sick to your stomach otherwise advance to regular diet as tolerated.    Follow recommendations on procedure report if provided by your doctor.    Call Dr Hook for problems [phone]    Problems may include but not limited to: large amounts of bleeding, trouble breathing, repeated vomiting, severe unrelieved pain, fever or chills.

## 2024-04-02 NOTE — H&P
"Addendum: Patient with heartburn symptoms so we will go ahead and proceed with upper endoscopy.  The risks and benefits of the procedure were discussed with the patient in detail and all questions were answered.  Possibility of perforation, bleeding, aspiration, anoxic brain injury, respiratory and/or cardiac arrest and death were discussed.  Consent will be signed and witnessed.      Jessica Mota presents to [facility] BARIATRIC SURGERY  History of Present Illness  Patient follows up after previous sleeve in 2019--patient had a preoperative weight of 330 pounds, got down to a low of 245 pounds but has been regaining weight and now is 275 pounds, this is a 6 pound weight gain since she was last seen 6 months ago--patient states that she has a new boyfriend has had a lot of medical problems including recently undergoing amputation of his foot due to his diabetes, she states that she has gotten into a codependent relationship where she has not been eating well nor focused on her exercise although she notes that she does do a lot of walking at work--patient takes both omeprazole and Tums for her reflux, but states that she is able to sleep flat on her stomach at night without any major issue        Objective  Vital Signs:  /96   Pulse 76   Temp 98 °F (36.7 °C) (Temporal)   Resp 18   Ht 165.1 cm (65\")   Wt 125 kg (275 lb)   BMI 45.76 kg/m²   Estimated body mass index is 45.76 kg/m² as calculated from the following:    Height as of this encounter: 165.1 cm (65\").    Weight as of this encounter: 125 kg (275 lb).               Physical Exam   Alert, pleasant  No respiratory distress  Abdomen soft        Result Review  :                           Assessment  Assessment and Plan   Diagnoses and all orders for this visit:     1. Obesity, Class III, BMI 40-49.9 (morbid obesity) (Primary)     2. Gastroesophageal reflux disease with esophagitis without hemorrhage     3. Unintended weight gain   " "  4. Dietary counseling     Long discussion with the patient about diet and exercise--we are going to try a \"reset\" diet her for the next month--patient also notes that she has cooking equipment such as her air Fryer and storage right now and needs to get that out--will plan on seeing the patient back in 3 months, if her reflux worsens or she continues to gain weight may consider upper GI to evaluate degree of reflux as well as size and shape of her gastric sleeve.           I spent 30 minutes caring for Jessica on this date of service. This time includes time spent by me in the following activities:preparing for the visit, reviewing tests, obtaining and/or reviewing a separately obtained history, performing a medically appropriate examination and/or evaluation , counseling and educating the patient/family/caregiver, documenting information in the medical record, and independently interpreting results and communicating that information with the patient/family/caregiver  Follow Up   No follow-ups on file.  Patient was given instructions and counseling regarding her condition or for health maintenance advice. Please see specific information pulled into the AVS if appropriate.      "

## 2024-04-02 NOTE — ANESTHESIA POSTPROCEDURE EVALUATION
Patient: Jessica Mota    Procedure Summary       Date: 04/02/24 Room / Location:  [facility]    Anesthesia Start: 1228 Anesthesia Stop: 1241    Procedure: ESOPHAGOGASTRODUODENOSCOPY WITH BALLOON DILATATION #20 (Esophagus) Diagnosis:       Other dysphagia      (Other dysphagia [R13.19])    Surgeons: Stevan Hook Jr., MD Provider: Lanre Min MD    Anesthesia Type: MAC ASA Status: 3            Anesthesia Type: MAC    Vitals  Vitals Value Taken Time   /90 04/02/24 1249   Temp     Pulse 69 04/02/24 1249   Resp 16 04/02/24 1249   SpO2 96 % 04/02/24 1249           Post Anesthesia Care and Evaluation    Patient location during evaluation: PACU  Patient participation: complete - patient participated  Level of consciousness: awake and alert  Pain management: adequate    Airway patency: patent  Anesthetic complications: No anesthetic complications    Cardiovascular status: acceptable  Respiratory status: acceptable  Hydration status: acceptable    Comments: --------------------            04/02/24               1249     --------------------   BP:       137/90     Pulse:      69       Resp:       16       SpO2:      96%      --------------------

## 2024-04-03 LAB — UREASE TISS QL: NEGATIVE

## 2024-04-04 LAB
LAB AP CASE REPORT: NORMAL
PATH REPORT.FINAL DX SPEC: NORMAL
PATH REPORT.GROSS SPEC: NORMAL

## 2024-05-16 ENCOUNTER — OFFICE VISIT (OUTPATIENT)
Dept: BARIATRICS/WEIGHT MGMT | Facility: CLINIC | Age: 49
End: 2024-05-16
Payer: COMMERCIAL

## 2024-05-16 VITALS
WEIGHT: 290 LBS | DIASTOLIC BLOOD PRESSURE: 94 MMHG | HEART RATE: 91 BPM | HEIGHT: 65 IN | BODY MASS INDEX: 48.32 KG/M2 | TEMPERATURE: 97.8 F | SYSTOLIC BLOOD PRESSURE: 149 MMHG

## 2024-05-16 DIAGNOSIS — F41.8 DEPRESSION WITH ANXIETY: ICD-10-CM

## 2024-05-16 DIAGNOSIS — E66.01 CLASS 3 SEVERE OBESITY WITH SERIOUS COMORBIDITY AND BODY MASS INDEX (BMI) OF 45.0 TO 49.9 IN ADULT, UNSPECIFIED OBESITY TYPE: Primary | ICD-10-CM

## 2024-05-16 DIAGNOSIS — K21.00 GASTROESOPHAGEAL REFLUX DISEASE WITH ESOPHAGITIS WITHOUT HEMORRHAGE: ICD-10-CM

## 2024-05-16 DIAGNOSIS — E55.9 VITAMIN D INSUFFICIENCY: ICD-10-CM

## 2024-05-16 DIAGNOSIS — Z98.84 S/P LAPAROSCOPIC SLEEVE GASTRECTOMY: ICD-10-CM

## 2024-05-16 DIAGNOSIS — G47.33 OSA (OBSTRUCTIVE SLEEP APNEA): ICD-10-CM

## 2024-05-16 DIAGNOSIS — R73.03 PREDIABETES: ICD-10-CM

## 2024-05-16 PROBLEM — E66.813 CLASS 3 SEVERE OBESITY WITH SERIOUS COMORBIDITY AND BODY MASS INDEX (BMI) OF 40.0 TO 44.9 IN ADULT: Status: ACTIVE | Noted: 2020-03-19

## 2024-05-16 PROBLEM — E66.813 CLASS 3 SEVERE OBESITY WITH SERIOUS COMORBIDITY AND BODY MASS INDEX (BMI) OF 40.0 TO 44.9 IN ADULT: Status: RESOLVED | Noted: 2020-03-19 | Resolved: 2024-05-16

## 2024-05-16 PROCEDURE — 99214 OFFICE O/P EST MOD 30 MIN: CPT | Performed by: NURSE PRACTITIONER

## 2024-05-16 RX ORDER — SUCRALFATE 1 G/1
1 TABLET ORAL 3 TIMES DAILY
Qty: 90 TABLET | Refills: 1 | Status: SHIPPED | OUTPATIENT
Start: 2024-05-16

## 2024-05-16 RX ORDER — METOCLOPRAMIDE 10 MG/1
10 TABLET ORAL
Qty: 16 TABLET | Refills: 0 | Status: SHIPPED | OUTPATIENT
Start: 2024-05-16

## 2024-05-16 RX ORDER — MELOXICAM 15 MG/1
15 TABLET ORAL DAILY
COMMUNITY
Start: 2024-02-14
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
## 2024-05-21 ENCOUNTER — LAB (OUTPATIENT)
Facility: HOSPITAL | Age: 49
End: 2024-05-21
Payer: COMMERCIAL

## 2024-05-21 DIAGNOSIS — R13.19 OTHER DYSPHAGIA: ICD-10-CM

## 2024-05-21 DIAGNOSIS — Z98.84 S/P LAPAROSCOPIC SLEEVE GASTRECTOMY: ICD-10-CM

## 2024-05-21 DIAGNOSIS — G47.33 OSA (OBSTRUCTIVE SLEEP APNEA): ICD-10-CM

## 2024-05-21 DIAGNOSIS — K21.00 GASTROESOPHAGEAL REFLUX DISEASE WITH ESOPHAGITIS WITHOUT HEMORRHAGE: ICD-10-CM

## 2024-05-21 DIAGNOSIS — E66.01 CLASS 3 SEVERE OBESITY WITH SERIOUS COMORBIDITY AND BODY MASS INDEX (BMI) OF 45.0 TO 49.9 IN ADULT, UNSPECIFIED OBESITY TYPE: Primary | ICD-10-CM

## 2024-05-21 DIAGNOSIS — E66.01 CLASS 3 SEVERE OBESITY WITH SERIOUS COMORBIDITY AND BODY MASS INDEX (BMI) OF 45.0 TO 49.9 IN ADULT, UNSPECIFIED OBESITY TYPE: ICD-10-CM

## 2024-05-21 LAB
25(OH)D3 SERPL-MCNC: 47.6 NG/ML (ref 30–100)
ALBUMIN SERPL-MCNC: 4.2 G/DL (ref 3.5–5.2)
ALBUMIN/GLOB SERPL: 1.6 G/DL
ALP SERPL-CCNC: 97 U/L (ref 39–117)
ALT SERPL W P-5'-P-CCNC: 18 U/L (ref 1–33)
ANION GAP SERPL CALCULATED.3IONS-SCNC: 9 MMOL/L (ref 5–15)
AST SERPL-CCNC: 14 U/L (ref 1–32)
BASOPHILS # BLD AUTO: 0.03 10*3/MM3 (ref 0–0.2)
BASOPHILS NFR BLD AUTO: 0.4 % (ref 0–1.5)
BILIRUB SERPL-MCNC: 0.4 MG/DL (ref 0–1.2)
BUN SERPL-MCNC: 17 MG/DL (ref 6–20)
BUN/CREAT SERPL: 15.7 (ref 7–25)
CALCIUM SPEC-SCNC: 8.9 MG/DL (ref 8.6–10.5)
CHLORIDE SERPL-SCNC: 105 MMOL/L (ref 98–107)
CO2 SERPL-SCNC: 26 MMOL/L (ref 22–29)
CREAT SERPL-MCNC: 1.08 MG/DL (ref 0.57–1)
DEPRECATED RDW RBC AUTO: 43.7 FL (ref 37–54)
EGFRCR SERPLBLD CKD-EPI 2021: 63.1 ML/MIN/1.73
EOSINOPHIL # BLD AUTO: 0.23 10*3/MM3 (ref 0–0.4)
EOSINOPHIL NFR BLD AUTO: 2.8 % (ref 0.3–6.2)
ERYTHROCYTE [DISTWIDTH] IN BLOOD BY AUTOMATED COUNT: 14.2 % (ref 12.3–15.4)
FERRITIN SERPL-MCNC: 70.2 NG/ML (ref 13–150)
FOLATE SERPL-MCNC: 9.73 NG/ML (ref 4.78–24.2)
GLOBULIN UR ELPH-MCNC: 2.6 GM/DL
GLUCOSE SERPL-MCNC: 89 MG/DL (ref 65–99)
HCT VFR BLD AUTO: 38.4 % (ref 34–46.6)
HGB BLD-MCNC: 12.6 G/DL (ref 12–15.9)
IMM GRANULOCYTES # BLD AUTO: 0.02 10*3/MM3 (ref 0–0.05)
IMM GRANULOCYTES NFR BLD AUTO: 0.2 % (ref 0–0.5)
IRON 24H UR-MRATE: 51 MCG/DL (ref 37–145)
LYMPHOCYTES # BLD AUTO: 2.51 10*3/MM3 (ref 0.7–3.1)
LYMPHOCYTES NFR BLD AUTO: 30.9 % (ref 19.6–45.3)
MCH RBC QN AUTO: 28.3 PG (ref 26.6–33)
MCHC RBC AUTO-ENTMCNC: 32.8 G/DL (ref 31.5–35.7)
MCV RBC AUTO: 86.1 FL (ref 79–97)
MONOCYTES # BLD AUTO: 0.39 10*3/MM3 (ref 0.1–0.9)
MONOCYTES NFR BLD AUTO: 4.8 % (ref 5–12)
NEUTROPHILS NFR BLD AUTO: 4.94 10*3/MM3 (ref 1.7–7)
NEUTROPHILS NFR BLD AUTO: 60.9 % (ref 42.7–76)
NRBC BLD AUTO-RTO: 0 /100 WBC (ref 0–0.2)
PLATELET # BLD AUTO: 282 10*3/MM3 (ref 140–450)
PMV BLD AUTO: 9 FL (ref 6–12)
POTASSIUM SERPL-SCNC: 4 MMOL/L (ref 3.5–5.2)
PREALB SERPL-MCNC: 20.8 MG/DL (ref 20–40)
PROT SERPL-MCNC: 6.8 G/DL (ref 6–8.5)
RBC # BLD AUTO: 4.46 10*6/MM3 (ref 3.77–5.28)
SODIUM SERPL-SCNC: 140 MMOL/L (ref 136–145)
WBC NRBC COR # BLD AUTO: 8.12 10*3/MM3 (ref 3.4–10.8)

## 2024-05-21 PROCEDURE — 85025 COMPLETE CBC W/AUTO DIFF WBC: CPT

## 2024-05-21 PROCEDURE — 84425 ASSAY OF VITAMIN B-1: CPT

## 2024-05-21 PROCEDURE — 82306 VITAMIN D 25 HYDROXY: CPT

## 2024-05-21 PROCEDURE — 80053 COMPREHEN METABOLIC PANEL: CPT

## 2024-05-21 PROCEDURE — 83921 ORGANIC ACID SINGLE QUANT: CPT

## 2024-05-21 PROCEDURE — 36415 COLL VENOUS BLD VENIPUNCTURE: CPT

## 2024-05-21 PROCEDURE — 84134 ASSAY OF PREALBUMIN: CPT

## 2024-05-21 PROCEDURE — 83540 ASSAY OF IRON: CPT

## 2024-05-21 PROCEDURE — 82746 ASSAY OF FOLIC ACID SERUM: CPT

## 2024-05-21 PROCEDURE — 82728 ASSAY OF FERRITIN: CPT

## 2024-05-26 LAB — VIT B1 BLD-SCNC: 114.1 NMOL/L (ref 66.5–200)

## 2024-05-28 LAB — METHYLMALONATE SERPL-SCNC: 156 NMOL/L (ref 0–378)

## 2024-08-06 ENCOUNTER — OFFICE (OUTPATIENT)
Dept: URBAN - METROPOLITAN AREA CLINIC 64 | Facility: CLINIC | Age: 49
End: 2024-08-06
Payer: COMMERCIAL

## 2024-08-06 VITALS
HEART RATE: 74 BPM | WEIGHT: 257 LBS | HEIGHT: 65 IN | SYSTOLIC BLOOD PRESSURE: 139 MMHG | DIASTOLIC BLOOD PRESSURE: 86 MMHG

## 2024-08-06 DIAGNOSIS — R19.4 CHANGE IN BOWEL HABIT: ICD-10-CM

## 2024-08-06 DIAGNOSIS — Z12.11 ENCOUNTER FOR SCREENING FOR MALIGNANT NEOPLASM OF COLON: ICD-10-CM

## 2024-08-06 DIAGNOSIS — K21.9 GASTRO-ESOPHAGEAL REFLUX DISEASE WITHOUT ESOPHAGITIS: ICD-10-CM

## 2024-08-06 PROCEDURE — 99204 OFFICE O/P NEW MOD 45 MIN: CPT | Performed by: INTERNAL MEDICINE

## 2024-08-06 RX ORDER — OMEPRAZOLE 40 MG/1
40 CAPSULE, DELAYED RELEASE ORAL
Qty: 90 | Refills: 3 | Status: ACTIVE
Start: 2024-08-06

## 2025-03-10 ENCOUNTER — LAB REQUISITION (OUTPATIENT)
Dept: LAB | Facility: HOSPITAL | Age: 50
End: 2025-03-10

## 2025-03-10 DIAGNOSIS — N20.1 CALCULUS OF URETER: ICD-10-CM

## 2025-03-10 PROCEDURE — 82365 CALCULUS SPECTROSCOPY: CPT | Performed by: UROLOGY

## 2025-03-18 LAB
CALCIUM OXALATE DIHYDRATE MFR STONE IR: 20 %
COLOR STONE: NORMAL
COM MFR STONE: 80 %
COMPN STONE: NORMAL
LABORATORY COMMENT REPORT: NORMAL
LABORATORY COMMENT REPORT: NORMAL
Lab: NORMAL
Lab: NORMAL
PHOTO: NORMAL
SIZE STONE: NORMAL MM
SPEC SOURCE SUBJ: NORMAL
WT STONE: 49 MG

## (undated) DEVICE — BLCK/BITE BLOX W/DENTL/RIM W/STRAP 54F

## (undated) DEVICE — BITEBLOCK OMNI BLOC

## (undated) DEVICE — GLV SURG SENSICARE MICRO PF LF 6 STRL

## (undated) DEVICE — CANN NASL CO2 TRULINK W/O2 A/

## (undated) DEVICE — Device: Brand: DEFENDO AIR/WATER/SUCTION AND BIOPSY VALVE

## (undated) DEVICE — DEV INFL ALLIANCE2 SYS

## (undated) DEVICE — SENSR O2 OXIMAX FNGR A/ 18IN NONSTR

## (undated) DEVICE — FRCP BX RADJAW4 NDL 2.8 240CM LG OG BX40

## (undated) DEVICE — APL DUPLOSPRAYER MIS 40CM

## (undated) DEVICE — GLV SURG SENSICARE PI LF PF 7.5 GRN STRL

## (undated) DEVICE — TUBING, SUCTION, 1/4" X 10', STRAIGHT: Brand: MEDLINE

## (undated) DEVICE — KT ORCA ORCAPOD DISP STRL

## (undated) DEVICE — MARKR SKIN W/RULR AND LBL

## (undated) DEVICE — LN SMPL CO2 SHTRM SD STREAM W/M LUER

## (undated) DEVICE — MSK PROC CURAPLEX O2 2/ADAPT 7FT

## (undated) DEVICE — ADAPT CLN BIOGUARD AIR/H2O DISP

## (undated) DEVICE — ESOPHAGEAL BALLOON DILATATION CATHETER: Brand: CRE FIXED WIRE

## (undated) DEVICE — GLV SURG BIOGEL LTX PF 8

## (undated) DEVICE — IRRIGATOR TOOMEY 70CC

## (undated) DEVICE — PK OSC LAP SLV 40

## (undated) DEVICE — GLV SURG SENSICARE POLYISPRN W/ALOE PF LF 6 GRN STRL

## (undated) DEVICE — ECHELON FLEX POWERED PLUS LONG ARTICULATING ENDOSCOPIC LINEAR CUTTER, 60MM: Brand: ECHELON FLEX

## (undated) DEVICE — GLV SURG SENSICARE PI PF LF 8.5 GRN STRL

## (undated) DEVICE — ENDOPATH XCEL BLADELESS TROCARS WITH STABILITY SLEEVES: Brand: ENDOPATH XCEL

## (undated) DEVICE — SUT SILK 0 SH 30IN K834H

## (undated) DEVICE — DUAL LUMEN STOMACH TUBE,ANTI-REFLUX VALVE: Brand: SALEM SUMP

## (undated) DEVICE — GLV SURG SENSICARE MICRO PF LF 8.5 STRL

## (undated) DEVICE — BLCK/BITE BLOX WO/DENTL/RIM W/STRAP 54F

## (undated) DEVICE — ENSEAL LAPAROSCOPIC TISSUE SEALER G2 ARTICULATING CURVED JAW FOR USE WITH G2 GENERATOR 5MM DIAMETER 45CM SHAFT LENGTH: Brand: ENSEAL

## (undated) DEVICE — CLMP STD 25CM DISP

## (undated) DEVICE — ADHS SKIN DERMABOND TOP ADVANCED